# Patient Record
Sex: FEMALE | Race: WHITE | NOT HISPANIC OR LATINO | Employment: OTHER | URBAN - METROPOLITAN AREA
[De-identification: names, ages, dates, MRNs, and addresses within clinical notes are randomized per-mention and may not be internally consistent; named-entity substitution may affect disease eponyms.]

---

## 2018-08-16 ENCOUNTER — APPOINTMENT (EMERGENCY)
Dept: RADIOLOGY | Facility: HOSPITAL | Age: 63
DRG: 536 | End: 2018-08-16
Payer: COMMERCIAL

## 2018-08-16 ENCOUNTER — HOSPITAL ENCOUNTER (INPATIENT)
Facility: HOSPITAL | Age: 63
LOS: 5 days | Discharge: NURSING, REHAB OR LONG-TERM CARE | DRG: 536 | End: 2018-08-21
Attending: EMERGENCY MEDICINE | Admitting: INTERNAL MEDICINE
Payer: COMMERCIAL

## 2018-08-16 DIAGNOSIS — S32.591A BILATERAL PUBIC RAMI FRACTURES, CLOSED, INITIAL ENCOUNTER (HCC): Primary | ICD-10-CM

## 2018-08-16 DIAGNOSIS — S32.592A CLOSED BILATERAL FRACTURE OF PUBIC RAMI, INITIAL ENCOUNTER (HCC): ICD-10-CM

## 2018-08-16 DIAGNOSIS — F41.9 ANXIETY AND DEPRESSION: Chronic | ICD-10-CM

## 2018-08-16 DIAGNOSIS — K59.00 CONSTIPATION: ICD-10-CM

## 2018-08-16 DIAGNOSIS — S32.592A BILATERAL PUBIC RAMI FRACTURES, CLOSED, INITIAL ENCOUNTER (HCC): Primary | ICD-10-CM

## 2018-08-16 DIAGNOSIS — S32.591A CLOSED BILATERAL FRACTURE OF PUBIC RAMI, INITIAL ENCOUNTER (HCC): ICD-10-CM

## 2018-08-16 DIAGNOSIS — F32.A ANXIETY AND DEPRESSION: Chronic | ICD-10-CM

## 2018-08-16 PROBLEM — R32 URINARY INCONTINENCE: Chronic | Status: ACTIVE | Noted: 2018-08-16

## 2018-08-16 LAB
ANION GAP SERPL CALCULATED.3IONS-SCNC: 4 MMOL/L (ref 4–13)
APTT PPP: 32 SECONDS (ref 24–36)
BASOPHILS # BLD AUTO: 0.02 THOUSANDS/ΜL (ref 0–0.1)
BASOPHILS NFR BLD AUTO: 0 % (ref 0–1)
BUN SERPL-MCNC: 18 MG/DL (ref 5–25)
CALCIUM SERPL-MCNC: 8.8 MG/DL (ref 8.3–10.1)
CHLORIDE SERPL-SCNC: 103 MMOL/L (ref 100–108)
CO2 SERPL-SCNC: 30 MMOL/L (ref 21–32)
CREAT SERPL-MCNC: 0.37 MG/DL (ref 0.6–1.3)
EOSINOPHIL # BLD AUTO: 0 THOUSAND/ΜL (ref 0–0.61)
EOSINOPHIL NFR BLD AUTO: 0 % (ref 0–6)
ERYTHROCYTE [DISTWIDTH] IN BLOOD BY AUTOMATED COUNT: 13.2 % (ref 11.6–15.1)
GFR SERPL CREATININE-BSD FRML MDRD: 114 ML/MIN/1.73SQ M
GLUCOSE SERPL-MCNC: 124 MG/DL (ref 65–140)
HCT VFR BLD AUTO: 40.9 % (ref 34.8–46.1)
HGB BLD-MCNC: 13.3 G/DL (ref 11.5–15.4)
IMM GRANULOCYTES # BLD AUTO: 0.04 THOUSAND/UL (ref 0–0.2)
IMM GRANULOCYTES NFR BLD AUTO: 0 % (ref 0–2)
INR PPP: 0.99 (ref 0.86–1.17)
LYMPHOCYTES # BLD AUTO: 0.73 THOUSANDS/ΜL (ref 0.6–4.47)
LYMPHOCYTES NFR BLD AUTO: 7 % (ref 14–44)
MCH RBC QN AUTO: 30.2 PG (ref 26.8–34.3)
MCHC RBC AUTO-ENTMCNC: 32.5 G/DL (ref 31.4–37.4)
MCV RBC AUTO: 93 FL (ref 82–98)
MONOCYTES # BLD AUTO: 0.48 THOUSAND/ΜL (ref 0.17–1.22)
MONOCYTES NFR BLD AUTO: 5 % (ref 4–12)
NEUTROPHILS # BLD AUTO: 8.78 THOUSANDS/ΜL (ref 1.85–7.62)
NEUTS SEG NFR BLD AUTO: 88 % (ref 43–75)
NRBC BLD AUTO-RTO: 0 /100 WBCS
PLATELET # BLD AUTO: 184 THOUSANDS/UL (ref 149–390)
PMV BLD AUTO: 10.3 FL (ref 8.9–12.7)
POTASSIUM SERPL-SCNC: 4.1 MMOL/L (ref 3.5–5.3)
PROTHROMBIN TIME: 12.8 SECONDS (ref 11.8–14.2)
RBC # BLD AUTO: 4.41 MILLION/UL (ref 3.81–5.12)
SODIUM SERPL-SCNC: 137 MMOL/L (ref 136–145)
WBC # BLD AUTO: 10.05 THOUSAND/UL (ref 4.31–10.16)

## 2018-08-16 PROCEDURE — 73502 X-RAY EXAM HIP UNI 2-3 VIEWS: CPT

## 2018-08-16 PROCEDURE — 85025 COMPLETE CBC W/AUTO DIFF WBC: CPT | Performed by: PHYSICIAN ASSISTANT

## 2018-08-16 PROCEDURE — 99284 EMERGENCY DEPT VISIT MOD MDM: CPT

## 2018-08-16 PROCEDURE — 85730 THROMBOPLASTIN TIME PARTIAL: CPT | Performed by: PHYSICIAN ASSISTANT

## 2018-08-16 PROCEDURE — 99223 1ST HOSP IP/OBS HIGH 75: CPT | Performed by: INTERNAL MEDICINE

## 2018-08-16 PROCEDURE — 85610 PROTHROMBIN TIME: CPT | Performed by: PHYSICIAN ASSISTANT

## 2018-08-16 PROCEDURE — 96374 THER/PROPH/DIAG INJ IV PUSH: CPT

## 2018-08-16 PROCEDURE — 36415 COLL VENOUS BLD VENIPUNCTURE: CPT | Performed by: PHYSICIAN ASSISTANT

## 2018-08-16 PROCEDURE — 80048 BASIC METABOLIC PNL TOTAL CA: CPT | Performed by: PHYSICIAN ASSISTANT

## 2018-08-16 RX ORDER — OXYCODONE HYDROCHLORIDE AND ACETAMINOPHEN 5; 325 MG/1; MG/1
1 TABLET ORAL EVERY 4 HOURS PRN
Status: DISCONTINUED | OUTPATIENT
Start: 2018-08-16 | End: 2018-08-18

## 2018-08-16 RX ORDER — OXYBUTYNIN CHLORIDE 5 MG/1
10 TABLET, EXTENDED RELEASE ORAL
Status: DISCONTINUED | OUTPATIENT
Start: 2018-08-16 | End: 2018-08-21 | Stop reason: HOSPADM

## 2018-08-16 RX ORDER — MORPHINE SULFATE 2 MG/ML
2 INJECTION, SOLUTION INTRAMUSCULAR; INTRAVENOUS EVERY 4 HOURS PRN
Status: DISCONTINUED | OUTPATIENT
Start: 2018-08-16 | End: 2018-08-18

## 2018-08-16 RX ORDER — MORPHINE SULFATE 2 MG/ML
2 INJECTION, SOLUTION INTRAMUSCULAR; INTRAVENOUS EVERY 4 HOURS PRN
Status: DISCONTINUED | OUTPATIENT
Start: 2018-08-16 | End: 2018-08-16

## 2018-08-16 RX ORDER — DULOXETIN HYDROCHLORIDE 60 MG/1
90 CAPSULE, DELAYED RELEASE ORAL DAILY
COMMUNITY
End: 2020-05-12 | Stop reason: ALTCHOICE

## 2018-08-16 RX ORDER — ALPRAZOLAM 0.25 MG/1
0.25 TABLET ORAL 2 TIMES DAILY PRN
Status: ON HOLD | COMMUNITY
End: 2018-08-21

## 2018-08-16 RX ORDER — OXYBUTYNIN CHLORIDE 10 MG/1
10 TABLET, EXTENDED RELEASE ORAL DAILY
COMMUNITY
End: 2020-05-15 | Stop reason: ALTCHOICE

## 2018-08-16 RX ORDER — METHOCARBAMOL 500 MG/1
500 TABLET, FILM COATED ORAL ONCE
Status: COMPLETED | OUTPATIENT
Start: 2018-08-16 | End: 2018-08-16

## 2018-08-16 RX ORDER — ACETAMINOPHEN 325 MG/1
650 TABLET ORAL EVERY 8 HOURS SCHEDULED
Status: DISCONTINUED | OUTPATIENT
Start: 2018-08-16 | End: 2018-08-18

## 2018-08-16 RX ORDER — DOCUSATE SODIUM 100 MG/1
100 CAPSULE, LIQUID FILLED ORAL 2 TIMES DAILY
Status: DISCONTINUED | OUTPATIENT
Start: 2018-08-16 | End: 2018-08-21 | Stop reason: HOSPADM

## 2018-08-16 RX ORDER — MELATONIN
1000 DAILY
COMMUNITY

## 2018-08-16 RX ORDER — IBUPROFEN 600 MG/1
600 TABLET ORAL ONCE
Status: COMPLETED | OUTPATIENT
Start: 2018-08-16 | End: 2018-08-16

## 2018-08-16 RX ORDER — ALPRAZOLAM 0.25 MG/1
0.25 TABLET ORAL
Status: DISCONTINUED | OUTPATIENT
Start: 2018-08-16 | End: 2018-08-17

## 2018-08-16 RX ORDER — ONDANSETRON 2 MG/ML
4 INJECTION INTRAMUSCULAR; INTRAVENOUS EVERY 6 HOURS PRN
Status: DISCONTINUED | OUTPATIENT
Start: 2018-08-16 | End: 2018-08-21 | Stop reason: HOSPADM

## 2018-08-16 RX ORDER — MORPHINE SULFATE 2 MG/ML
2 INJECTION, SOLUTION INTRAMUSCULAR; INTRAVENOUS ONCE
Status: COMPLETED | OUTPATIENT
Start: 2018-08-16 | End: 2018-08-16

## 2018-08-16 RX ADMIN — IBUPROFEN 600 MG: 600 TABLET ORAL at 13:23

## 2018-08-16 RX ADMIN — OXYBUTYNIN CHLORIDE 10 MG: 5 TABLET, EXTENDED RELEASE ORAL at 21:27

## 2018-08-16 RX ADMIN — MORPHINE SULFATE 2 MG: 2 INJECTION, SOLUTION INTRAMUSCULAR; INTRAVENOUS at 21:27

## 2018-08-16 RX ADMIN — MORPHINE SULFATE 2 MG: 2 INJECTION, SOLUTION INTRAMUSCULAR; INTRAVENOUS at 17:28

## 2018-08-16 RX ADMIN — ACETAMINOPHEN 650 MG: 325 TABLET, FILM COATED ORAL at 21:27

## 2018-08-16 RX ADMIN — MORPHINE SULFATE 2 MG: 2 INJECTION, SOLUTION INTRAMUSCULAR; INTRAVENOUS at 14:35

## 2018-08-16 RX ADMIN — METHOCARBAMOL 500 MG: 500 TABLET ORAL at 13:23

## 2018-08-16 RX ADMIN — OXYCODONE HYDROCHLORIDE AND ACETAMINOPHEN 1 TABLET: 5; 325 TABLET ORAL at 19:53

## 2018-08-16 NOTE — H&P
[address] [facility]  H&P- Pradeep Swenson 1955, 61 y o  female MRN: [id number]    Unit/Bed#: ED 13 Encounter: [id number]    Primary Care Provider: Katalina Arango MD   Date and time admitted to hospital: 8/16/2018 12:11 PM        * Closed bilateral fracture of pubic rami [facility])   Assessment & Plan    · POA, noted on x-rays, do not appear to be displaced  Fall seemed to have been mechanical in nature  Pain 3/10 at this time  Uses a walker at baseline to ambulate   · Admit patient to med/surg under inpatient status   · Consult orthopedic surgery - do not feel that surgical intervention is needed, but outpatient follow up will be necessary   · Analgesia  · Tylenol 975 mg PO Q8 scheduled   · Percocet 5/325 mg PO Q4 PRN moderate pain   · Morphine 2 mg IV Q4 severe/breakthrough pain   · PT/OT        Anxiety and depression   Assessment & Plan    · Stable at this time   · Continue Cymbalta with Xanax QHS PRN         Urinary incontinence   Assessment & Plan    · Chronic   · Continue Oxybutynin           VTE Prophylaxis: Enoxaparin (Lovenox)  / sequential compression device   Code Status: Full Code   POLST: POLST form is not discussed and not completed at this time  Discussion with family: No family at bedside - will return if family requests to discuss plan     Anticipated Length of Stay:  Patient will be admitted on an Inpatient basis with an anticipated length of stay of  Greater than 2 midnights  Justification for Hospital Stay: Bilateral pubic rami fractures needing IV pain medication, inpatient PT/OT eval     Total Time for Visit, including Counseling / Coordination of Care: 1 hour  Greater than 50% of this total time spent on direct patient counseling and coordination of care  Chief Complaint:   Fall    History of Present Illness:    Pradeep Swenson is a 61 y o  female with a history of urinary incontinence who presents with a fall   The patient reports that last night she was walking with her 2 canes when she believes that her right knee gave out from underneath her and she fell backwards on her right side in a seated position  She reported immediate pain in the bilateral hips that she described as a "tight strained" pain  She states that she was able to ambulate after the fall, but she had pain and needed to upgrade to her walker  It was at that time that she decided to come in for evaluation  At this point she states that she is comfortable after pain medication reporting her pain at a 3/10  She denies any numbness, tingling, or weakness in her legs any more than usual  She reports urinary incontinence, however this is a chronic problem for her  She denies fecal incontinence  She denies saddle anesthesia  She denies symptoms prior to the fall such as chest pain, palpitations, shortness of breath, headaches, dizziness, or change in vision  She does report a history of a blood clot in her right leg after a prior joint replacement, but is not on anti-coagulation currently  Review of Systems:    Review of Systems   Constitutional: Negative for appetite change, chills, diaphoresis, fatigue and fever  HENT: Negative for congestion, rhinorrhea and sore throat  Eyes: Negative for visual disturbance  Respiratory: Negative for cough, chest tightness, shortness of breath and wheezing  Cardiovascular: Negative for chest pain, palpitations and leg swelling  Gastrointestinal: Negative for abdominal pain, constipation, diarrhea, nausea and vomiting  Genitourinary: Negative for dysuria  Musculoskeletal: Positive for arthralgias (bilateral hips) and gait problem  Negative for myalgias  Neurological: Negative for dizziness, syncope, weakness, light-headedness, numbness and headaches  All other systems reviewed and are negative        Past Medical and Surgical History:     Past Medical History:   Diagnosis Date    Scoliosis        Past Surgical History:   Procedure Laterality Date    JOINT REPLACEMENT      left hip and knee       Meds/Allergies:    Prior to Admission medications    Medication Sig Start Date End Date Taking? Authorizing Provider   ALPRAZolam Virgene Reeines) 0 25 mg tablet Take 0 25 mg by mouth daily at bedtime as needed for anxiety   Yes Historical Provider, MD   DULoxetine (CYMBALTA) 60 mg delayed release capsule Take 90 mg by mouth daily   Yes Historical Provider, MD   oxybutynin (DITROPAN-XL) 10 MG 24 hr tablet Take 10 mg by mouth daily at bedtime   Yes Historical Provider, MD     I have reviewed home medications with patient personally  Allergies: No Known Allergies    Social History:     Marital Status: /Civil Union   Occupation: None  Patient Pre-hospital Living Situation: Home with   Patient Pre-hospital Level of Mobility: Canes/walker  Patient Pre-hospital Diet Restrictions: None  Substance Use History:   History   Alcohol Use No     History   Smoking Status    Never Smoker   Smokeless Tobacco    Never Used     History   Drug Use No       Family History:    History reviewed  No pertinent family history  Physical Exam:     Vitals:   Blood Pressure: (!) 178/71 (08/16/18 1211)  Pulse: 78 (08/16/18 1211)  Temperature: 99 1 °F (37 3 °C) (08/16/18 1211)  Temp Source: Oral (08/16/18 1211)  Respirations: 18 (08/16/18 1211)  Weight - Scale: 57 9 kg (127 lb 10 3 oz) (08/16/18 1211)  SpO2: 96 % (08/16/18 1211)    Physical Exam   Constitutional: She is oriented to person, place, and time  Vital signs are normal  She appears well-developed and well-nourished  Non-toxic appearance  She appears distressed (mild)  HENT:   Head: Normocephalic and atraumatic  Mouth/Throat: Mucous membranes are not dry  Eyes: Conjunctivae and EOM are normal  Pupils are equal, round, and reactive to light  No scleral icterus  Pupils are equal    Neck: Neck supple  Cardiovascular: Normal rate, regular rhythm, S1 normal, S2 normal, normal heart sounds and intact distal pulses    Exam reveals no S3 and no S4  No murmur heard  Pulmonary/Chest: Effort normal and breath sounds normal  No accessory muscle usage  No respiratory distress  She has no decreased breath sounds  She has no wheezes  She has no rhonchi  She has no rales  She exhibits no tenderness  Abdominal: Soft  Bowel sounds are normal  She exhibits no distension and no mass  There is no tenderness  There is no rigidity, no rebound and no guarding  Musculoskeletal:        Right hip: She exhibits tenderness  Left hip: She exhibits tenderness  Neurological: She is alert and oriented to person, place, and time  She is not disoriented  No sensory deficit  Skin: Skin is warm and dry  Additional Data:     Lab Results: I have personally reviewed pertinent reports  Results from last 7 days  Lab Units 08/16/18  1434   WBC Thousand/uL 10 05   HEMOGLOBIN g/dL 13 3   HEMATOCRIT % 40 9   PLATELETS Thousands/uL 184   NEUTROS PCT % 88*   LYMPHS PCT % 7*   MONOS PCT % 5   EOS PCT % 0       Results from last 7 days  Lab Units 08/16/18  1434   SODIUM mmol/L 137   POTASSIUM mmol/L 4 1   CHLORIDE mmol/L 103   CO2 mmol/L 30   BUN mg/dL 18   CREATININE mg/dL 0 37*   CALCIUM mg/dL 8 8   GLUCOSE RANDOM mg/dL 124       Results from last 7 days  Lab Units 08/16/18  1434   INR  0 99               Imaging: I have personally reviewed pertinent reports  XR hip/pelv 2-3 vws right if performed   ED Interpretation by Hasmukh Arango PA-C (08/16 1406)   Superior and inferior pubic rami fracture on the right  Final Result by Grant Harris DO (08/16 1438)   Fractures of bilateral inferior and superior pubic rami as described  Deformity of the right femoral head which appears to be degenerative  If there is any clinical suspicion for an occult right femoral head or neck fracture: Consider CT or preferably    MRI        Workstation performed: [id number]             EKG, Pathology, and Other Studies Reviewed on Admission:   XR Hip/Pelvis Right: Bilateral inferior and superior pubic rami fracture as described  Deformity of the right femoral head appears to be degenerative  Allscripts / Epic Records Reviewed: Yes     ** Please Note: This note has been constructed using a voice recognition system   **

## 2018-08-16 NOTE — ASSESSMENT & PLAN NOTE
· POA, noted on x-rays, do not appear to be displaced  Fall seemed to have been mechanical in nature  Pain 3/10 at this time   Uses a walker at baseline to ambulate   · Admit patient to med/surg under inpatient status   · Consult orthopedic surgery - do not feel that surgical intervention is needed, but outpatient follow up will be necessary   · Analgesia  · Tylenol 975 mg PO Q8 scheduled   · Percocet 5/325 mg PO Q4 PRN moderate pain   · Morphine 2 mg IV Q4 severe/breakthrough pain   · PT/OT

## 2018-08-16 NOTE — ED PROVIDER NOTES
History  Chief Complaint   Patient presents with    Hip Injury     pt states she fell last night, landed on buttocks initially but then to avoid hitting her head she leaned onto her right side  now c/o right hip pain  hx of falls  40-year-old female with past medical history of scoliosis, who presents to the emergency department for right hip pain status post fall yesterday  Patient states that she was ambulating when she lost her balance and fell backwards landing on her buttocks and shifting onto the right side  Since then she has complained of aching 4/10 pain that is worse with ambulation and palpation  Denies numbness, tingling, weakness  Denies redness or warmth  Denies ecchymosis  Neck denies fevers or chills  Denies chest pain, shortness of breath, palpitations prior to fall  Denies head injury or head strike  Has not take anything for pain prior to arrival         History provided by:  Patient   used: No        Prior to Admission Medications   Prescriptions Last Dose Informant Patient Reported? Taking? ALPRAZolam (XANAX) 0 25 mg tablet   Yes Yes   Sig: Take 0 25 mg by mouth daily at bedtime as needed for anxiety   DULoxetine (CYMBALTA) 60 mg delayed release capsule   Yes Yes   Sig: Take 90 mg by mouth daily   oxybutynin (DITROPAN-XL) 10 MG 24 hr tablet   Yes Yes   Sig: Take 10 mg by mouth daily at bedtime      Facility-Administered Medications: None       Past Medical History:   Diagnosis Date    Scoliosis        Past Surgical History:   Procedure Laterality Date    JOINT REPLACEMENT      left hip and knee       History reviewed  No pertinent family history  I have reviewed and agree with the history as documented  Social History   Substance Use Topics    Smoking status: Never Smoker    Smokeless tobacco: Never Used    Alcohol use No        Review of Systems   Constitutional: Negative for chills and fever  HENT: Negative  Eyes: Negative      Respiratory: Negative for cough, chest tightness, shortness of breath and wheezing  Cardiovascular: Negative for chest pain, palpitations and leg swelling  Gastrointestinal: Negative for abdominal pain, constipation, diarrhea, nausea and vomiting  Genitourinary: Negative for dysuria, flank pain, frequency, hematuria and urgency  Musculoskeletal: Positive for arthralgias and gait problem  Negative for back pain, joint swelling, myalgias, neck pain and neck stiffness  Skin: Negative for color change, pallor, rash and wound  Neurological: Negative for dizziness, syncope, weakness, light-headedness, numbness and headaches  Psychiatric/Behavioral: Negative  Physical Exam  Physical Exam   Constitutional: She is oriented to person, place, and time  She appears well-developed and well-nourished  No distress  HENT:   Head: Normocephalic and atraumatic  Eyes: Conjunctivae and EOM are normal  Pupils are equal, round, and reactive to light  Neck: Normal range of motion  Neck supple  Cardiovascular: Normal rate, regular rhythm and intact distal pulses  Pulmonary/Chest: Effort normal and breath sounds normal  She has no wheezes  She has no rales  Abdominal: Soft  There is no tenderness  Musculoskeletal: Normal range of motion  She exhibits tenderness (mild to right lateral hip and groin  No pelvic instability  )  She exhibits no edema  Neurological: She is alert and oriented to person, place, and time  No cranial nerve deficit or sensory deficit  She exhibits normal muscle tone  Coordination normal    Skin: Skin is warm and dry  Capillary refill takes less than 2 seconds  She is not diaphoretic  Psychiatric: She has a normal mood and affect  Her behavior is normal    Nursing note and vitals reviewed        Vital Signs  ED Triage Vitals   Temperature Pulse Respirations Blood Pressure SpO2   08/16/18 1211 08/16/18 1211 08/16/18 1211 08/16/18 1211 08/16/18 1211   99 1 °F (37 3 °C) 78 18 (!) 178/71 96 % Temp Source Heart Rate Source Patient Position - Orthostatic VS BP Location FiO2 (%)   08/16/18 1211 08/16/18 1211 -- -- --   Oral Monitor         Pain Score       08/16/18 1323       5           Vitals:    08/16/18 1211   BP: (!) 178/71   Pulse: 78       Visual Acuity      ED Medications  Medications   morphine injection 2 mg (not administered)   methocarbamol (ROBAXIN) tablet 500 mg (500 mg Oral Given 8/16/18 1323)   ibuprofen (MOTRIN) tablet 600 mg (600 mg Oral Given 8/16/18 1323)   morphine injection 2 mg (2 mg Intravenous Given 8/16/18 1435)       Diagnostic Studies  Results Reviewed     Procedure Component Value Units Date/Time    Protime-INR [[id number]]  (Normal) Collected:  08/16/18 1434    Lab Status:  Final result Specimen:  Blood from Arm, Right Updated:  08/16/18 1452     Protime 12 8 seconds      INR 0 99    APTT [[id number]]  (Normal) Collected:  08/16/18 1434    Lab Status:  Final result Specimen:  Blood from Arm, Right Updated:  08/16/18 1452     PTT 32 seconds     Basic metabolic panel [[id number]]  (Abnormal) Collected:  08/16/18 1434    Lab Status:  Final result Specimen:  Blood from Arm, Right Updated:  08/16/18 1451     Sodium 137 mmol/L      Potassium 4 1 mmol/L      Chloride 103 mmol/L      CO2 30 mmol/L      Anion Gap 4 mmol/L      BUN 18 mg/dL      Creatinine 0 37 (L) mg/dL      Glucose 124 mg/dL      Calcium 8 8 mg/dL      eGFR 114 ml/min/1 73sq m     Narrative:         National Kidney Disease Education Program recommendations are as follows:  GFR calculation is accurate only with a steady state creatinine  Chronic Kidney disease less than 60 ml/min/1 73 sq  meters  Kidney failure less than 15 ml/min/1 73 sq  meters      CBC and differential [[id number]]  (Abnormal) Collected:  08/16/18 1434    Lab Status:  Final result Specimen:  Blood from Arm, Right Updated:  08/16/18 1445     WBC 10 05 Thousand/uL      RBC 4 41 Million/uL      Hemoglobin 13 3 g/dL      Hematocrit 40 9 %      MCV 93 fL MCH 30 2 pg      MCHC 32 5 g/dL      RDW 13 2 %      MPV 10 3 fL      Platelets 554 Thousands/uL      nRBC 0 /100 WBCs      Neutrophils Relative 88 (H) %      Immat GRANS % 0 %      Lymphocytes Relative 7 (L) %      Monocytes Relative 5 %      Eosinophils Relative 0 %      Basophils Relative 0 %      Neutrophils Absolute 8 78 (H) Thousands/µL      Immature Grans Absolute 0 04 Thousand/uL      Lymphocytes Absolute 0 73 Thousands/µL      Monocytes Absolute 0 48 Thousand/µL      Eosinophils Absolute 0 00 Thousand/µL      Basophils Absolute 0 02 Thousands/µL                  XR hip/pelv 2-3 vws right if performed   ED Interpretation by Verner Ruby, PA-C (08/16 1406)   Superior and inferior pubic rami fracture on the right  Final Result by Reina Real DO (08/16 1438)   Fractures of bilateral inferior and superior pubic rami as described  Deformity of the right femoral head which appears to be degenerative  If there is any clinical suspicion for an occult right femoral head or neck fracture: Consider CT or preferably    MRI  Workstation performed: [id number]                    Procedures  Procedures       Phone Contacts  ED Phone Contact    ED Course                               MDM  Number of Diagnoses or Management Options  Bilateral pubic rami fractures, closed, initial encounter [facility]):   Diagnosis management comments: Differential Diagnosis includes but is not limited to: sprain/strain, contusion, fracture/dislocation, musculoskeletal pain  X-ray of the right hip and pelvis shows bilateral superior and inferior pubic rami fractures  Labs are generally unremarkable  Admit to Medicine with Ortho to consult         Amount and/or Complexity of Data Reviewed  Clinical lab tests: ordered and reviewed  Tests in the radiology section of CPT®: ordered and reviewed      CritCare Time    Disposition  Final diagnoses:   Bilateral pubic rami fractures, closed, initial encounter [facility])     Time reflects when diagnosis was documented in both MDM as applicable and the Disposition within this note     Time User Action Codes Description Comment    8/16/2018  3:02 PM Leana Mcburney Add [S32 591A,  S32 592A] Bilateral pubic rami fractures, closed, initial encounter ([facility] [address] )     8/16/2018  3:57 PM Charlene Hastings Add [B28 218U,  S32 592A] Closed bilateral fracture of pubic rami, initial encounter ([facility] )     8/16/2018  3:57 PM Charlene Hastings Modify [P65 251C,  S32 592A] Closed bilateral fracture of pubic rami, initial encounter [facility])       ED Disposition     ED Disposition Condition Comment    Admit  Case was discussed with Dr Terri Sim and the patient's admission status was agreed to be Admission Status: inpatient status to the service of Dr Terri Sim   Follow-up Information    None         Patient's Medications   Discharge Prescriptions    No medications on file     No discharge procedures on file      ED Provider  Electronically Signed by           Tyree Lu PA-C  08/16/18 3543

## 2018-08-17 ENCOUNTER — APPOINTMENT (INPATIENT)
Dept: CT IMAGING | Facility: HOSPITAL | Age: 63
DRG: 536 | End: 2018-08-17
Payer: COMMERCIAL

## 2018-08-17 LAB
ERYTHROCYTE [DISTWIDTH] IN BLOOD BY AUTOMATED COUNT: 13.6 % (ref 11.6–15.1)
HCT VFR BLD AUTO: 41.6 % (ref 34.8–46.1)
HGB BLD-MCNC: 13.1 G/DL (ref 11.5–15.4)
MCH RBC QN AUTO: 30 PG (ref 26.8–34.3)
MCHC RBC AUTO-ENTMCNC: 31.5 G/DL (ref 31.4–37.4)
MCV RBC AUTO: 95 FL (ref 82–98)
PLATELET # BLD AUTO: 143 THOUSANDS/UL (ref 149–390)
PMV BLD AUTO: 11.1 FL (ref 8.9–12.7)
RBC # BLD AUTO: 4.37 MILLION/UL (ref 3.81–5.12)
WBC # BLD AUTO: 8.58 THOUSAND/UL (ref 4.31–10.16)

## 2018-08-17 PROCEDURE — 73700 CT LOWER EXTREMITY W/O DYE: CPT

## 2018-08-17 PROCEDURE — 97530 THERAPEUTIC ACTIVITIES: CPT

## 2018-08-17 PROCEDURE — 97163 PT EVAL HIGH COMPLEX 45 MIN: CPT

## 2018-08-17 PROCEDURE — 97166 OT EVAL MOD COMPLEX 45 MIN: CPT

## 2018-08-17 PROCEDURE — G8988 SELF CARE GOAL STATUS: HCPCS

## 2018-08-17 PROCEDURE — G8978 MOBILITY CURRENT STATUS: HCPCS

## 2018-08-17 PROCEDURE — G8979 MOBILITY GOAL STATUS: HCPCS

## 2018-08-17 PROCEDURE — 99254 IP/OBS CNSLTJ NEW/EST MOD 60: CPT | Performed by: PHYSICIAN ASSISTANT

## 2018-08-17 PROCEDURE — G8987 SELF CARE CURRENT STATUS: HCPCS

## 2018-08-17 PROCEDURE — 85027 COMPLETE CBC AUTOMATED: CPT | Performed by: PHYSICIAN ASSISTANT

## 2018-08-17 PROCEDURE — 99232 SBSQ HOSP IP/OBS MODERATE 35: CPT | Performed by: NURSE PRACTITIONER

## 2018-08-17 RX ORDER — ALPRAZOLAM 0.25 MG/1
0.25 TABLET ORAL 2 TIMES DAILY PRN
Status: DISCONTINUED | OUTPATIENT
Start: 2018-08-17 | End: 2018-08-21 | Stop reason: HOSPADM

## 2018-08-17 RX ORDER — POLYETHYLENE GLYCOL 3350 17 G/17G
17 POWDER, FOR SOLUTION ORAL DAILY
Status: DISCONTINUED | OUTPATIENT
Start: 2018-08-18 | End: 2018-08-21 | Stop reason: HOSPADM

## 2018-08-17 RX ADMIN — OXYCODONE HYDROCHLORIDE AND ACETAMINOPHEN 1 TABLET: 5; 325 TABLET ORAL at 20:14

## 2018-08-17 RX ADMIN — OXYBUTYNIN CHLORIDE 10 MG: 5 TABLET, EXTENDED RELEASE ORAL at 21:06

## 2018-08-17 RX ADMIN — DULOXETINE HYDROCHLORIDE 90 MG: 60 CAPSULE, DELAYED RELEASE ORAL at 09:21

## 2018-08-17 RX ADMIN — ACETAMINOPHEN 650 MG: 325 TABLET, FILM COATED ORAL at 05:45

## 2018-08-17 RX ADMIN — ACETAMINOPHEN 650 MG: 325 TABLET, FILM COATED ORAL at 21:06

## 2018-08-17 RX ADMIN — OXYCODONE HYDROCHLORIDE AND ACETAMINOPHEN 1 TABLET: 5; 325 TABLET ORAL at 07:50

## 2018-08-17 RX ADMIN — ALPRAZOLAM 0.25 MG: 0.25 TABLET ORAL at 19:11

## 2018-08-17 RX ADMIN — ENOXAPARIN SODIUM 40 MG: 40 INJECTION SUBCUTANEOUS at 09:21

## 2018-08-17 RX ADMIN — MORPHINE SULFATE 2 MG: 2 INJECTION, SOLUTION INTRAMUSCULAR; INTRAVENOUS at 17:12

## 2018-08-17 RX ADMIN — ACETAMINOPHEN 650 MG: 325 TABLET, FILM COATED ORAL at 14:14

## 2018-08-17 RX ADMIN — MORPHINE SULFATE 2 MG: 2 INJECTION, SOLUTION INTRAMUSCULAR; INTRAVENOUS at 09:21

## 2018-08-17 RX ADMIN — DOCUSATE SODIUM 100 MG: 100 CAPSULE, LIQUID FILLED ORAL at 09:22

## 2018-08-17 RX ADMIN — OXYCODONE HYDROCHLORIDE AND ACETAMINOPHEN 1 TABLET: 5; 325 TABLET ORAL at 12:05

## 2018-08-17 RX ADMIN — DOCUSATE SODIUM 100 MG: 100 CAPSULE, LIQUID FILLED ORAL at 17:12

## 2018-08-17 NOTE — SOCIAL WORK
CM met with Pt and Pt's daughter at bedside  Pt lives with her spouse in a 1 level home with 2 JJ  Pt uses a cane and walker  Pt reports being independent with ambulation and ADL's  Pt does not have a history of Rehab  Pt has had [facility] in the past but not currently  Pt uses [facility] in [place] and has Rx coverage  Pt denies any D&A history  Pt has depression and is prescribed medication by her PCP  Pt does not have a POA and declines information  Pt drives herself  Currently PT recommendation is STR as Pt is NWB  CT Scan is pending and if negative for fracture PT will need to re-eval  CM dept will follow Pt and determine DCP  CM did leave Pt with a SNF list in case she needs STR

## 2018-08-17 NOTE — PLAN OF CARE
Problem: PHYSICAL THERAPY ADULT  Goal: Performs mobility at highest level of function for planned discharge setting  See evaluation for individualized goals  Treatment/Interventions: Functional transfer training, LE strengthening/ROM, Elevations, Therapeutic exercise, Endurance training, Cognitive reorientation, Patient/family training, Gait training, Bed mobility, Equipment eval/education, Spoke to nursing  Equipment Recommended: Wheelchair       See flowsheet documentation for full assessment, interventions and recommendations  Prognosis: Fair  Problem List: Decreased strength, Decreased range of motion, Decreased endurance, Impaired balance, Decreased mobility, Decreased coordination, Orthopedic restrictions, Pain  Assessment: Pt is a 61 y o  female seen for PT evaluation s/p admit to [facility] on 8/16/2018 w/ Closed bilateral fracture of pubic rami ([address] )  However ortho consult via physician extender has recommended further imaging, NWB on RLE and WBAT on LLE  Order placed for PT  Prior to admission, pt lived with spouse, was on disability, used 1-2 canes vs furniture support for mobility and had 2 JJ, and step on main floor from kitchen to laundry room  Upon evaluation: Pt requires 1person assistance for bed mobilty, 2person assistance for transfers and is currently non amb due to pain and difficulty maintaining NWB RLE  Ken Oliva Pt's clinical presentation is currently unstable/unpredictable given the functional mobility deficits above, coupled with fall risks including hx of falls, impulsivity, impaired balance and limited WB, prior use of A device, and combined with medical complications of hypertension   Pt to benefit from continued skilled PT tx while in hospital and upon DC to address deficits as defined above and maximize level of functional mobility   From PT/mobility standpoint, recommendation at time of d/c would be inpatient rehab pending progress in order to maximize pt's functional independence and consistency w/ mobility in order to facilitate return to PLOF  Recommend trial with WC next 1-2 sessions, ther ex next 1-2 sessions and further assessment with walker pending definitive ortho plan/further imaging as indicated  Barriers to Discharge: Decreased caregiver support, Inaccessible home environment     Recommendation: Post acute IP rehab          See flowsheet documentation for full assessment

## 2018-08-17 NOTE — PROGRESS NOTES
[address] [facility]    Progress Note - Bassem Escobar 1955, 61 y o  female MRN: [id number]    Unit/Bed#: -Arlene Encounter: [id number]    Primary Care Provider: Caitlyn Lindo MD   Date and time admitted to hospital: 8/16/2018 12:11 PM    * Closed bilateral fracture of pubic rami [facility])   Assessment & Plan    · POA, noted on x-rays, do not appear to be displaced  Fall seemed to have been mechanical in nature  Pain 3/10 at this time  Uses a 2 canes or a walker at baseline to ambulate   · Ortho consult noted, recommending CT of right hip given deformity of the right femoral head would want to r/o fracture given increased pain on that side s/p fall  No surgical intervention needed for pubic rami fx, outpatient f/u    · F/u on CT Scan  NWB until fracture ruled out  If negative, PT re-eval for therapy recommendations, currently recommending inpatient however this is with NWB status  · DVT prophylaxis  · Pain control with:  · Tylenol 975 mg PO Q8 scheduled   · Percocet 5/325 mg PO Q4 PRN moderate pain   · Morphine 2 mg IV Q4 severe/breakthrough pain         Anxiety and depression   Assessment & Plan    · Stable at this time   · Continue Cymbalta with Xanax BID PRN         Urinary incontinence   Assessment & Plan    · Chronic   · Continue Oxybutynin           Pharmacologic: Enoxaparin (Lovenox)  Mechanical VTE Prophylaxis in Place: Yes    Patient Centered Rounds: I have performed bedside rounds with nursing staff today  Discussions with Specialists or Other Care Team Provider: nursing, PT, CM    Education and Discussions with Family / Patient: Patient and daughter at bedside  Time Spent for Care: 30 minutes  More than 50% of total time spent on counseling and coordination of care as described above      Current Length of Stay: 1 day(s)    Current Patient Status: Inpatient   Certification Statement: The patient will continue to require additional inpatient hospital stay due to Ct of right hip pending, additional ortho recommendations, PT re-eval for dispo  Discharge Plan / Estimated Discharge Date: Hopefully tomorrow if CT negative for fracture and PT ok with home  Code Status: Level 1 - Full Code      Subjective:   Patient still complains of pain but states that it is well controlled  States that pain is worse on right side into the groin  She is unsure if she fell more so on the right versus the left  She states that she was using 2 canes when the fall occurred  She reports using assistive devices secondary to scoliosis for many years  Objective:     Vitals:   Temp (24hrs), Av 4 °F (36 9 °C), Min:97 9 °F (36 6 °C), Max:98 8 °F (37 1 °C)    HR:  [74-75] 75  Resp:  [18] 18  BP: (109-140)/(53-66) 130/61  SpO2:  [91 %-99 %] 99 %  Body mass index is 23 62 kg/m²  Input and Output Summary (last 24 hours): Intake/Output Summary (Last 24 hours) at 18 1441  Last data filed at 18 0900   Gross per 24 hour   Intake              360 ml   Output                0 ml   Net              360 ml       Physical Exam:     Physical Exam   Constitutional: She is oriented to person, place, and time  No distress  HENT:   Head: Normocephalic  Eyes: Pupils are equal, round, and reactive to light  Neck: Normal range of motion  Cardiovascular: Normal rate, regular rhythm and intact distal pulses  No murmur heard  Pulmonary/Chest: Effort normal and breath sounds normal    Abdominal: Soft  Bowel sounds are normal    Musculoskeletal: She exhibits tenderness (Right hip, groin, bilateral lower back) and deformity (Scoliosis noted)  Nonweightbearing to right lower extremity   Neurological: She is alert and oriented to person, place, and time  Skin: Skin is warm and dry  Psychiatric: She has a normal mood and affect  Nursing note and vitals reviewed        Additional Data:     Labs:      Results from last 7 days  Lab Units 18  0532 18  1434   WBC Thousand/uL 8 58 10 05   HEMOGLOBIN g/dL 13 1 13 3   HEMATOCRIT % 41 6 40 9   PLATELETS Thousands/uL 143* 184   NEUTROS PCT %  --  88*   LYMPHS PCT %  --  7*   MONOS PCT %  --  5   EOS PCT %  --  0       Results from last 7 days  Lab Units 08/16/18  1434   SODIUM mmol/L 137   POTASSIUM mmol/L 4 1   CHLORIDE mmol/L 103   CO2 mmol/L 30   BUN mg/dL 18   CREATININE mg/dL 0 37*   CALCIUM mg/dL 8 8   GLUCOSE RANDOM mg/dL 124       Results from last 7 days  Lab Units 08/16/18  1434   INR  0 99         Recent Cultures (last 7 days):           Last 24 Hours Medication List:     Current Facility-Administered Medications:  acetaminophen 650 mg Oral Q8H [address] Yanniko Suarez, EMANUEL   ALPRAZolam 0 25 mg Oral BID PRN JUAN CARLOS Rivera   docusate sodium 100 mg Oral BID Yan Lowery, PA-MERARI   DULoxetine 90 mg Oral Daily Yan Suarez, PA-C   enoxaparin 40 mg Subcutaneous Daily Yan Suarez, PA-MERARI   morphine injection 2 mg Intravenous Q4H PRN Yan Lowery, PA-C   morphine injection 2 mg Intravenous Q4H PRN Lori Colon, PA-C   ondansetron 4 mg Intravenous Q6H PRN Yan Lowery, PA-C   oxybutynin 10 mg Oral HS Yan Lowery, PA-C   oxyCODONE-acetaminophen 1 tablet Oral Q4H PRN Yan Lowery, EMANUEL   [START ON 8/18/2018] polyethylene glycol 17 g Oral Daily JUAN CARLOS Ruiz        Today, Patient Was Seen By: JUAN CARLOS Lucia    ** Please Note: Dragon 360 Dictation voice to text software may have been used in the creation of this document   **

## 2018-08-17 NOTE — CASE MANAGEMENT
Initial Clinical Review    Admission: Date/Time/Statement: 8/16/18 @ 1503     Orders Placed This Encounter   Procedures    Inpatient Admission (expected length of stay for this patient is greater than two midnights)     Standing Status:   Standing     Number of Occurrences:   1     Order Specific Question:   Admitting Physician     Answer:   Wayne Stanley     Order Specific Question:   Level of Care     Answer:   Med Surg [16]     Order Specific Question:   Estimated length of stay     Answer:   More than 2 Midnights     Order Specific Question:   Certification     Answer:   I certify that inpatient services are medically necessary for this patient for a duration of greater than two midnights  See H&P and MD Progress Notes for additional information about the patient's course of treatment  ED: Date/Time/Mode of Arrival:   ED Arrival Information     Expected Arrival Acuity Means of Arrival Escorted By Service Admission Type    - 8/16/2018 12:01 Less Urgent Wheelchair Family Member General Medicine Urgent    Arrival Complaint    hip injury          Chief Complaint:   Chief Complaint   Patient presents with    Hip Injury     pt states she fell last night, landed on buttocks initially but then to avoid hitting her head she leaned onto her right side  now c/o right hip pain  hx of falls  History of Illness: 61 y o  female with a history of urinary incontinence who presents with a fall  The patient reports that last night she was walking with her 2 canes when she believes that her right knee gave out from underneath her and she fell backwards on her right side in a seated position  She reported immediate pain in the bilateral hips that she described as a "tight strained" pain  She states that she was able to ambulate after the fall, but she had pain and needed to upgrade to her walker  It was at that time that she decided to come in for evaluation    She reports urinary incontinence, however this is a chronic problem for her  She does report a history of a blood clot in her right leg after a prior joint replacement, but is not on anti-coagulation currently    ED Vital Signs:   ED Triage Vitals   Temperature Pulse Respirations Blood Pressure SpO2   08/16/18 1211 08/16/18 1211 08/16/18 1211 08/16/18 1211 08/16/18 1211   99 1 °F (37 3 °C) 78 18 (!) 178/71 96 %      Temp Source Heart Rate Source Patient Position - Orthostatic VS BP Location FiO2 (%)   08/16/18 1211 08/16/18 1211 08/16/18 1836 08/16/18 1836 --   Oral Monitor Lying Left arm       Pain Score       08/16/18 1323       5        Wt Readings from Last 1 Encounters:   08/16/18 56 7 kg (125 lb)       Vital Signs (abnormal): maximum /71    Abnormal Labs/Diagnostic Test Results:   Bun 18  Creatinine 0 37    Xray right hip - Fractures of bilateral inferior and superior pubic rami as described   Deformity of the right femoral head which appears to be degenerative   If there is any clinical suspicion for an occult right femoral head or neck fracture: Consider CT or preferably MRI     8/17/2018-  Platelets 510  ED Treatment:   Medication Administration from 08/16/2018 1201 to 08/16/2018 1832       Date/Time Order Dose Route Action Comments     08/16/2018 1323 methocarbamol (ROBAXIN) tablet 500 mg 500 mg Oral Given      08/16/2018 1323 ibuprofen (MOTRIN) tablet 600 mg 600 mg Oral Given      08/16/2018 1435 morphine injection 2 mg 2 mg Intravenous Given      08/16/2018 1728 morphine injection 2 mg 2 mg Intravenous Given           Past Medical/Surgical History:    Active Ambulatory Problems     Diagnosis Date Noted    No Active Ambulatory Problems     Resolved Ambulatory Problems     Diagnosis Date Noted    No Resolved Ambulatory Problems     Past Medical History:   Diagnosis Date    Scoliosis        Admitting Diagnosis: Hip injury [S79 919A]  Bilateral pubic rami fractures, closed, initial encounter ([facility] [address] ) [S32 591A, S32 592A]  Closed bilateral fracture of pubic rami, initial encounter ([facility] [address] ) [S32 591A, S32 592A]    Age/Sex: 61 y o  female    Assessment/Plan:   Closed bilateral fracture of pubic rami (HCC)   Assessment & Plan     · POA, noted on x-rays, do not appear to be displaced  Fall seemed to have been mechanical in nature  Pain 3/10 at this time  Uses a walker at baseline to ambulate   ? Admit patient to med/surg under inpatient status   ? Consult orthopedic surgery - do not feel that surgical intervention is needed, but outpatient follow up will be necessary   ? Analgesia  § Tylenol 975 mg PO Q8 scheduled   § Percocet 5/325 mg PO Q4 PRN moderate pain   § Morphine 2 mg IV Q4 severe/breakthrough pain   ? PT/OT          Anxiety and depression   Assessment & Plan     · Stable at this time   ? Continue Cymbalta with Xanax QHS PRN           Urinary incontinence   Assessment & Plan     · Chronic   ? Continue Oxybutynin          Admission Orders:  8/16/2018  1503 INPATIENT   Scheduled Meds:   Current Facility-Administered Medications:  acetaminophen 650 mg Oral Q8H [address]   ALPRAZolam 0 25 mg Oral HS PRN   docusate sodium 100 mg Oral BID   DULoxetine 90 mg Oral Daily   enoxaparin 40 mg Subcutaneous Daily   morphine injection 2 mg Intravenous Q4H PRN   morphine injection 2 mg Intravenous Q4H PRN   ondansetron 4 mg Intravenous Q6H PRN   oxybutynin 10 mg Oral HS   oxyCODONE-acetaminophen 1 tablet Oral Q4H PRN     Continuous Infusions:    PRN Meds:    morphine injection 2 mg iv - used x 3 thus far (1728;  2127;  8235)    oxyCODONE-acetaminophen - used x 2  OTHER ORDERS:  scds  Consult orthopedics  PT/OT    Per orthopedics 8/17- 61 y  o female S/P mechanical fall with bilateral superior and inferior pubic rami fracture  Plan:      · Consider CT of right hip  · NWB RLE pending decision for further imaging and if so results of imaging  · WBAT LLE  · Non-operative treatment of pubic rami fractures planned currently  · Analgesics for pain control  · DVT prophylaxis  · PT/OT eval and treat  · Dispo: Ortho will follow         Thank you,  [address] Utilization Review Department  Phone: [phone]; Fax [phone]  ATTENTION: Please call with any questions or concerns to [phone]  and carefully follow the prompts so that you are directed to the right person  Send all requests for admission clinical reviews, approved or denied determinations and any other requests to fax [phone]   All voicemails are confidential

## 2018-08-17 NOTE — OCCUPATIONAL THERAPY NOTE
[address]  Evaluation     Patient Name: Vanessa Nelson  XSOVV'Z Date: 8/17/2018  Problem List  Patient Active Problem List   Diagnosis    Closed bilateral fracture of pubic rami ([address] )    Anxiety and depression    Urinary incontinence     Past Medical History  Past Medical History:   Diagnosis Date    Scoliosis      Past Surgical History  Past Surgical History:   Procedure Laterality Date    BACK SURGERY  1974    velarde damon in back for scoliosis    JOINT REPLACEMENT      left hip and knee      08/17/18 1227   Note Type   Note type Eval only   Restrictions/Precautions   Weight Bearing Precautions Per Order Yes   RLE Weight Bearing Per Order NWB   LLE Weight Bearing Per Order WBAT   Other Precautions Pain; Fall Risk;WBS; Bed Alarm   Pain Assessment   Pain Assessment 0-10   Pain Score 3   Pain Type Acute pain   Pain Location Groin; Hip   Pain Orientation Bilateral   Effect of Pain on Daily Activities limits I w/ LB ADL and fuctional transfers/ mobility   Patient's Stated Pain Goal No pain   Hospital Pain Intervention(s) Repositioned; Ambulation/increased activity; Emotional support  (RNCindy to provide meds)   Response to Interventions tolerated   Home Living   Type of [address] [place] [address] One level; Other (Comment); Performs ADLs on one level  (2 JJ)   Bathroom Shower/Tub Tub/shower unit   Bathroom Toilet Standard   Bathroom Equipment Toilet raiser;Tub transfer bench   [address] Walker;Cane;Wheelchair-manual;Reacher;Sock aid;Long-handled shoehorn;Grab bars   Additional Comments Pt reports living w/  in [facility] w/ 2 JJ  Pt reports having AE from hip / knee replacement and DME     Prior Function   Level of [place] Independent with ADLs and functional mobility   Lives With Katina Help From Other (Comment)  (spouse, works full time)   ADL Assistance Independent   IADLs Independent   Falls in the last 6 months 1 to 4   Vocational On disability   Comments Pt reports using 2 canes for community mobility, and 1 can vs furniture / wall walk at w/ in house  Pt uses AE when completing LBD   Lifestyle   Autonomy Pt reports I w/ ADL/ IADL PTA including driving  Reciprocal Relationships Supportive and involved  arrived during eval  Pt reports  works full time   Service to Others Pt reports on disability, and reports that she worked at [employer] reports enjoying knitting, and is actively involved w/ [employer]; one for [employer]   ADL   Eating Assistance 6  Modified independent   Eating Deficit Setup   Grooming Assistance 6  Modified Independent  (seated at EOB)   Grooming Deficit Setup   UB Bathing Assistance Unable to assess  (demonstrated ROM, coordination to complete)   LB Bathing Assistance Unable to assess   UB Dressing Assistance 4  Minimal Assistance   UB Dressing Deficit Pull around back; Setup; Increased time to complete   LB Dressing Assistance 2  Maximal Assistance   LB Dressing Deficit Setup;Supervision/safety;Verbal cueing; Increased time to complete; Thread RLE into underwear; Thread LLE into underwear;Pull up over hips;Don/doff R sock; Don/doff L sock  (pt reports use of AE at baseline)   [address]  4  Minimal Assistance   Toileting Deficit Setup; Increased time to complete;Supervison/safety;Verbal cueing; Bedside commode;Clothing management up;Clothing management down   Bed Mobility   Supine to Sit 4  Minimal assistance   Additional items Assist x 1;HOB elevated; Increased time required;LE management;Verbal cues   Sit to Supine Unable to assess   Additional Comments Pt seated at EOB w/ tray table present,  and bed alarm activated  Needs met and call bell in reach   Transfers   Sit to Stand Unable to assess  (difficulty maintain R LE NWB)   Sliding Board transfer 2  Maximal assistance  (bed to drop arm commode to her L)   Additional items Assist x 2; Increased time required;Verbal cues;Armrests   Toilet transfer 2  Maximal assistance   Additional items Assist x 2; Increased time required;Commode;Armrests; Verbal cues; Other  (to bedside commode )   Balance   Static Sitting Good   Dynamic Sitting Fair -   Static Standing Zero   Activity Tolerance   Activity Tolerance Patient limited by pain   Medical Staff Made Aware spoke to PT, [facility]   Nurse Made Aware spoke to RN, Marlys Elizabeth   RUE Assessment   RUE Assessment WFL   RUE Strength   RUE Overall Strength Within Functional Limits - able to perform ADL tasks with strength   LUE Assessment   LUE Assessment WFL   LUE Strength   LUE Overall Strength Within Functional Limits - able to perform ADL tasks with strength   Hand Function   Gross Motor Coordination Functional   Fine Motor Coordination Functional   Sensation   Light Touch No apparent deficits  (B UE)   Sharp/Dull Not tested   Vision-Basic Assessment   Current Vision Wears glasses all the time   Cognition   Overall Cognitive Status Impaired   Arousal/Participation Alert; Cooperative   Attention Attends with cues to redirect   Orientation Level Oriented X4   Memory Decreased recall of precautions   Following Commands Follows one step commands with increased time or repetition   Comments Identified pt by full name and birthdate  Pt able to provide social history  Cues / prompts for consistent recall details  Pt able to verbalize understanding of weight bearing restrictions, but required assist to maintain during functional transfers and ADL performance   Assessment   Limitation Decreased ADL status; Decreased endurance;Decreased Safe judgement during ADL;Decreased self-care trans;Decreased high-level ADLs   Assessment Pt is a 61yo female [facility] on 8/16/2018  Pt presents w/ closed bilateral fracture of pubic rami and signficant PMH impacting her occupational performance including L JOVANI, L TKA  Pt reports living w/ her  in [facility] w/ 2 JJ PTA   Pt reports one cane and furniture / wall for functional mobilty w/ in house and 2 canes for communtiy mobility PTA  Pt reports I w/ ADL/ IADL including driving  Pt required min A to complete bed mobility supine to sit at EOB  Pt required max A to complete LBD to don socks, and don / doff brief  Pt completed functional sliding board transfer bed to drop arm commode w/ max A x2  Pt required min A to complete toileting for clothing mgmt  Pt benefits from cues and assist to consisntently maintain weight bearing precautions  Pt presents w/ increased pain, decreased LE strength, decreased LE ROM, limited insight into deficits, and decreased carryover of precautions impacting her I w/ dressing, bathing, functional transfers, functional mobility, community mobility, clothing mgmt, food prep / clean up  Pt would benefit from OT while in acute care to address deficits  From an OT perspective, pt would benefit from post acute rehab vs home w/ family assist and home OT pending progress in acute care  Will continue to follow   Goals   Patient Goals Pt stated that she would like to be able to go home and walk   Plan   Treatment Interventions ADL retraining;Functional transfer training;Patient/family training;Equipment evaluation/education; Compensatory technique education;Continued evaluation; Activityengagement   Goal Expiration Date 08/24/18   OT Frequency 3-5x/wk   Recommendation   OT Discharge Recommendation Other (Comment)  (post acute rehab vs home pend progress)   Equipment Recommended Other (comment)  (drop arm commode)   OT - OK to Discharge (when medically stable to post acute rehab)   Barthel Index   Feeding 10   Bathing 0   Grooming Score 5   Dressing Score 5   Bladder Score 5   Bowels Score 10   Toilet Use Score 5   Transfers (Bed/Chair) Score 5   Mobility (Level Surface) Score 0   Stairs Score 0   Barthel Index Score 45   Modified [place] Scale   Modified Rocio Scale 4   Pt goals to be met in 7 days:  1   Pt will complete bed mobility supine <> sit w/ mod I to max I w/ ADL performance  2  Pt will complete UBD w/ mod I after set- up to max I w/ ADL performance  3  Pt will complete LBD w/ min A x1 using AE to max I w/ toileting and community mobility to be able to attend [employer] clubs  4  Pt will demonstrate increased activity tolerance and good unsupported sitting balance at EOB for at least 20 minutes to max I w/ ADL performance and improve activity engagement  5  Pt will complete functional sliding board transfers to bed, chair, and commode w/ S   6  Pt will demonstrate good attention and participation in continued evaluation to assess functional mobility using least restrictive device to max I w/ community mobility to return to [employer] clubs  7  Pt will demonstrate good attention and participation in continued evaluation to assess functional standing tolerance to assist in safe discharge planning  8  Pt will demonstrate good attention and verbalize understanding of safety precaution and weight bearing precautions during ADL performance  9   Pt will consistently demonstrate understanding of safety / weight bearing precautions during ADL performance w/ good carryover    Ynes Turk OTR/L

## 2018-08-17 NOTE — PHYSICAL THERAPY NOTE
PHYSICAL THERAPY NOTE  Patient Name: Armando Reilly  XCPEJ'M Date: 8/17/2018  Chart reviewed  Ortho pending   Will follow Katt Brown PT

## 2018-08-17 NOTE — PLAN OF CARE
Problem: PHYSICAL THERAPY ADULT  Goal: Performs mobility at highest level of function for planned discharge setting  See evaluation for individualized goals  Treatment/Interventions: Functional transfer training, LE strengthening/ROM, Elevations, Therapeutic exercise, Endurance training, Cognitive reorientation, Patient/family training, Gait training, Bed mobility, Equipment eval/education, Spoke to nursing  Equipment Recommended: Wheelchair       See flowsheet documentation for full assessment, interventions and recommendations  Outcome: Progressing  Prognosis: Fair  Problem List: Decreased strength, Decreased range of motion, Decreased endurance, Impaired balance, Decreased mobility, Decreased coordination, Orthopedic restrictions, Pain  Assessment: Pt requires similar physical assistance to perform back to bed transfers, and requires >50% verbal instruction or tactile feedback to perform transfers with proper form and technique  Barriers to Discharge: Decreased caregiver support, Inaccessible home environment     Recommendation: Post acute IP rehab          See flowsheet documentation for full assessment

## 2018-08-17 NOTE — PHYSICAL THERAPY NOTE
PHYSICAL THERAPY EVALUATION  NAME:  Valerie Cortes  DATE: 08/17/18    AGE:   61 y o  Mrn:   [id number]  ADMIT DX:  Hip injury [S73 [id number]]  Bilateral pubic rami fractures, closed, initial encounter ([facility] ) [S32 591A, S32 592A]  Closed bilateral fracture of pubic rami, initial encounter ([facility] ) Uriel Whitman, S32 592A]    Past Medical History:   Diagnosis Date    Scoliosis      Length Of Stay: 1  Performed at least 2 patient identifiers during session: Name and Birthday  PHYSICAL THERAPY EVALUATION :    08/17/18 1156   Note Type   Note type Eval/Treat   Pain Assessment   Pain Assessment 0-10   Pain Type Acute pain 3/10   Pain Location Hip;Leg;Pelvis   Effect of Pain on Daily Activities limits speed and indep of mobility, limits arom, hovever pt reports limited pain w/self weight bearing  attemps on rle   Patient's Stated Pain Goal No pain   Hospital Pain Intervention(s) Ambulation/increased activity;Repositioned;Medication (See MAR); Emotional support  (RN medicated pt during session)   Home Living   Type of [address] One level  (2STE, 1 steps from kitchen to laundry)   Home Equipment (Pt used one cane/furniture in house; 2 canes outside  )   Additional Comments Pt also has WC, pivoting wheeled walker   Prior Function   Level of [place] Independent with ADLs and functional mobility   Lives With Spouse   ADL Assistance Independent   Falls in the last 6 months 1 to 4   Vocational On disability   Restrictions/Precautions   Weight Bearing Precautions Per Order Yes   RLE Weight Bearing Per Order NWB  (per ortho progress note)   LLE Weight Bearing Per Order WBAT  (per ortho progress note)   Other Precautions Bed Alarm; Fall Risk;Pain;WBS   General   Additional Pertinent History Xray impression : Total left hip arthroplasty with single acetabular retention screw appears intact  There is flattening and deformation of the right femoral head which appears to be degenerative in etiology    A definite femoral fracture is not identified on this exam   There is a large osteophyte at the right acetabular roof  Nondisplaced fracture right superior pubic ramus near the acetabulum  Nondisplaced comminuted fracture right inferior pubic ramus near the pubic symphysis with caudal displacement of an avulsed cortical fracture fragment  There are also slightly displaced fractures of the left superior and inferior pubic rami near the pubic symphysis  Ortho Assessment:63 y  o female S/P mechanical fall with bilateral superior and inferior pubic rami fracture  Plan: Consider CT of right hip, NWB RLE pending decision for further imaging and if so results of imaging, WBAT LLE, Non-operative treatment of pubic rami fractures planned currently    Family/Caregiver Present Yes  (@ end of eval)   Cognition   Overall Cognitive Status Impaired   Arousal/Participation Alert   Orientation Level Oriented to person;Oriented to place   Following Commands Follows one step commands with increased time or repetition   RUE Strength   RUE Overall Strength Within Functional Limits - able to perform ADL tasks with strength   LUE Strength   LUE Overall Strength Within Functional Limits - able to perform ADL tasks with strength   RLE Overall AROM   R Hip Flexion limited    R Hip ABduction WFL   R Knee Extension WFL   R Ankle Dorsiflexion WFL   Strength RLE   R Hip Flexion 2+/5   R Hip ABduction 2/5   R Hip ADduction 2/5   R Knee Extension (tested to 3)   R Ankle Dorsiflexion 4/5   LLE Overall AROM   L Hip Flexion limited   L Hip ABduction WFL   L Knee Extension WFL   L Ankle Dorsiflexion WFL   Strength LLE   L Hip Flexion 2+/5   L Hip ABduction 2/5   L Hip ADduction 2/5   L Knee Extension (tested to 3)   L Ankle Dorsiflexion 4/5   Coordination   Movements are Fluid and Coordinated 0   Sensation WFL  (vision and hearing)   Light Touch   RLE Light Touch Grossly intact   LLE Light Touch Grossly intact   Bed Mobility   Additional items Assist x 1; Increased time required;Verbal cues; Bedrails;HOB elevated   Transfers   Sit to Stand Unable to assess   Additional items (due to difficulty maintaining NWB on RLE)   Sliding Board transfer 2  Maximal assistance  (L side, downhill)   Additional items Assist x 2; Increased time required;Verbal cues;Armrests   Ambulation/Elevation   Gait pattern Not appropriate  (due to pain and dififculty maintaining WB status)   Balance   Static Sitting Good   Static Standing Zero  (Pt unable to perform complete upright standing)   Endurance Deficit   Endurance Deficit Yes   Endurance Deficit Description Pt reports fatigue with Mobility   Activity Tolerance   Activity Tolerance Patient limited by pain; Patient limited by fatigue   Medical Staff Made Aware spoke to [facility] and Tong Longo from case management   Nurse Made Aware spoke to Amada Jovel RN   Assessment   Prognosis Fair   Problem List Decreased strength;Decreased range of motion;Decreased endurance; Impaired balance;Decreased mobility; Decreased coordination;Orthopedic restrictions;Pain   Barriers to Discharge Decreased caregiver support; Inaccessible home environment   Goals   Patient Goals to be able to go home and walk with weight on legs   STG Expiration Date 08/20/18   Treatment Day 1  (treatment documented in note)   Plan   Treatment/Interventions Functional transfer training;LE strengthening/ROM; Elevations; Therapeutic exercise; Endurance training;Cognitive reorientation;Patient/family training;Gait training;Bed mobility; Equipment eval/education;Spoke to nursing   PT Frequency 5x/wk; Weekend  (1-2 x/weekend depending on definitive ortho plan)   Recommendation   Recommendation Post acute IP rehab   Equipment Recommended Wheelchair   Additional Comments Pt's functional mobility is limited due to weight bearing RLE > pain, plus barriers of JJ and steps on first floor   Barthel Index   Feeding 10   Bathing 0   Grooming Score 5   Dressing Score 5   Bladder Score 5   Bowels Score 10   Toilet Use Score 5   Transfers (Bed/Chair) Score 5   Mobility (Level Surface) Score 0   Stairs Score 0   Barthel Index Score 45   (Please find full objective findings from PT assessment regarding body systems outlined above)  Assessment: Pt is a 61 y o  female seen for PT evaluation s/p admit to [address] [place] [address] on 8/16/2018 w/ Closed bilateral fracture of pubic rami ([address] )  However ortho consult via physician extender has recommended further imaging, NWB on RLE and WBAT on LLE  Order placed for PT  Prior to admission, pt lived with spouse, was on disability, used 1-2 canes vs furniture support for mobility and had 2 JJ, and step on main floor from kitchen to laundry room  Upon evaluation: Pt requires 1person assistance for bed mobilty, 2person assistance for transfers and is currently non amb due to pain and difficulty maintaining NWB RLE  Sherre Soulier Pt's clinical presentation is currently unstable/unpredictable given the functional mobility deficits above, coupled with fall risks including hx of falls, impulsivity, impaired balance and limited WB, prior use of A device, and combined with medical complications of hypertension   Pt to benefit from continued skilled PT tx while in hospital and upon DC to address deficits as defined above and maximize level of functional mobility  From PT/mobility standpoint, recommendation at time of d/c would be inpatient rehab pending progress in order to maximize pt's functional independence and consistency w/ mobility in order to facilitate return to PLOF  Recommend trial with WC next 1-2 sessions, ther ex next 1-2 sessions and further assessment with walker pending definitive ortho plan/further imaging as indicated  Goals: Pt will: Perform bed mobility tasks to modified I to improve ease of bed mobility and increase Indep in prior living environment  Perform pivot vs sliding board transfers to Supervision to decrease risk for falls and increase Indep in prior living environment   Assess ambulation and WC mobility as appropriate and set goals  to increase Indep in prior living environment and demonstrate compliance with WB limitations  Increase B hip flexion and knee ext strength by 1 grade to improve ease of transfers and improve ease of bed mobility    The following objective measures were performed on IE: Barthel Index 45/100  Comorbidities affecting pt's physical performance at time of assessment include: orthopedic surgery and scoliosis, L hip and L knee replacements  Personal factors affecting pt at time of IE include: steps to enter environment, multi-level environment, past experience, inability to perform IADLs, inability to navigate community distances, limited insight into impairments and recent fall(s)  PHYSICAL THERAPY TREATMENT NOTE  Time In: 12:17  Time Out:12:27  Total Time: 10 min  S: Pt agreeable to perform additional transfer training  O:  Transfers from drop arm commode to bed (uphill toward R Side) via slide board max A of 2, taking more then reasonable time  A to maintain NWB, verbal instruction to lean forward, limit WB,  on RLE,  And to press weight through sliding board for ease of transfer  Pt agreeable to sitting on edge of bed while spouse present with call bell in reach   A:  Pt requires similar physical assistance to perform back to bed transfers, and requires >50% verbal instruction or tactile feedback to perform transfers with proper form and technique    P:  Recommend addition of therapeutic exercises to current functional mobility program, await clarification for weight bearing/definitive plan of care on rLE    Gideon Valdez, PT, DPT

## 2018-08-17 NOTE — PLAN OF CARE
Problem: DISCHARGE PLANNING - CARE MANAGEMENT  Goal: Discharge to post-acute care or home with appropriate resources  INTERVENTIONS:  - Conduct assessment to determine patient/family and health care team treatment goals, and need for post-acute services based on payer coverage, community resources, and patient preferences, and barriers to discharge  - Address psychosocial, clinical, and financial barriers to discharge as identified in assessment in conjunction with the patient/family and health care team  - Arrange appropriate level of post-acute services according to patients   needs and preference and payer coverage in collaboration with the physician and health care team  - Communicate with and update the patient/family, physician, and health care team regarding progress on the discharge plan  - Arrange appropriate transportation to post-acute venues  Outcome: Progressing  CM met with Pt and Pt's daughter at bedside  Pt lives with her spouse in a 1 level home with 2 JJ  Pt uses a cane and walker  Pt reports being independent with ambulation and ADL's  Pt does not have a history of Rehab  Pt has had [facility] in the past but not currently  Pt uses [facility] in [place] and has Rx coverage  Pt denies any D&A history  Pt has depression and is prescribed medication by her PCP  Pt does not have a POA and declines information  Pt drives herself  Currently PT recommendation is STR as Pt is NWB  CT Scan is pending and if negative for fracture PT will need to re-eval  CM dept will follow Pt and determine DCP  CM did leave Pt with a SNF list in case she needs STR

## 2018-08-17 NOTE — ASSESSMENT & PLAN NOTE
· POA, noted on x-rays, do not appear to be displaced  Fall seemed to have been mechanical in nature  Pain 3/10 at this time  Uses a 2 canes or a walker at baseline to ambulate   · Ortho consult noted, recommending CT of right hip given deformity of the right femoral head would want to r/o fracture given increased pain on that side s/p fall  No surgical intervention needed for pubic rami fx, outpatient f/u    · F/u on CT Scan  NWB until fracture ruled out  If negative, PT re-eval for therapy recommendations, currently recommending inpatient however this is with NWB status  · DVT prophylaxis    · Pain control with:  · Tylenol 975 mg PO Q8 scheduled   · Percocet 5/325 mg PO Q4 PRN moderate pain   · Morphine 2 mg IV Q4 severe/breakthrough pain

## 2018-08-17 NOTE — PLAN OF CARE
DISCHARGE PLANNING     Discharge to home or other facility with appropriate resources Progressing        PAIN - ADULT     Verbalizes/displays adequate comfort level or baseline comfort level Progressing        Potential for Falls     Patient will remain free of falls Progressing        SAFETY ADULT     Maintain or return to baseline ADL function Progressing     Maintain or return mobility status to optimal level Progressing

## 2018-08-17 NOTE — PLAN OF CARE
Problem: OCCUPATIONAL THERAPY ADULT  Goal: Performs self-care activities at highest level of function for planned discharge setting  See evaluation for individualized goals  Treatment Interventions: ADL retraining, Functional transfer training, Patient/family training, Equipment evaluation/education, Compensatory technique education, Continued evaluation, Activityengagement  Equipment Recommended: Other (comment) (drop arm commode)       See flowsheet documentation for full assessment, interventions and recommendations  Limitation: Decreased ADL status, Decreased endurance, Decreased Safe judgement during ADL, Decreased self-care trans, Decreased high-level ADLs     Assessment: Pt is a 61yo female [facility] to [facility] on 8/16/2018  Pt presents w/ closed bilateral fracture of pubic rami and signficant PMH impacting her occupational performance including L JOVANI, L TKA  Pt reports living w/ her  in [facility] w/ 2 JJ PTA  Pt reports one cane and furniture / wall for functional mobilty w/ in house and 2 canes for communtiy mobility PTA  Pt reports I w/ ADL/ IADL including driving  Pt required min A to complete bed mobility supine to sit at EOB  Pt required max A to complete LBD to don socks, and don / doff brief  Pt completed functional sliding board transfer bed to drop arm commode w/ max A x2  Pt required min A to complete toileting for clothing mgmt  Pt benefits from cues and assist to consisntently maintain weight bearing precautions  Pt presents w/ increased pain, decreased LE strength, decreased LE ROM, limited insight into deficits, and decreased carryover of precautions impacting her I w/ dressing, bathing, functional transfers, functional mobility, community mobility, clothing mgmt, food prep / clean up  Pt would benefit from OT while in acute care to address deficits  From an OT perspective, pt would benefit from post acute rehab vs home w/ family assist and home OT pending progress in acute care   Will continue to follow     OT Discharge Recommendation: Other (Comment) (post acute rehab vs home pend progress)  OT - OK to Discharge:  (when medically stable to post acute rehab)

## 2018-08-17 NOTE — CONSULTS
Orthopedics   David Lemus 61 y o  female MRN: [id number]  Unit/Bed#: -01      Chief Complaint:   bilateral anterior pelvis pain    HPI:   61 y  o female status post fall while walking on sidewalk complaining or right groin pain And bilateral anterior pelvic pain  Patient reports that she was walking on sidewalk, says she  Normally walks with 2 canes, reports she lost her balance and fell  On her sacrum with her back leaning slightly towards the right  She was brought to the ED, had x-rays,  Diagnosed with superior and inferior pubic rami fractures  She reports that she is currently having a constant, aching pain the anterior pelvis, worse in the right side where it also was felt lateral thigh  Says the pain is 8 to 9/10 at worst, about 3 to 4/10 at best   It is worse with any type of movement, better with rest, slightly better with pain medication  She denies any numbness or tingling in the bilateral lower extremities  Denies any back pain, bowel or bladder incontinence  She does report history of left total hip arthroplasty in the remote past     Review Of Systems:   · Skin: Normal  · Neuro: See HPI  · Musculoskeletal: See HPI  · 14 point review of systems negative except as stated above     Past Medical History:   Past Medical History:   Diagnosis Date    Scoliosis        Past Surgical History:   Past Surgical History:   Procedure Laterality Date    BACK SURGERY  1974    velarde damon in back for scoliosis    JOINT REPLACEMENT      left hip and knee       Family History:  Family history reviewed and non-contributory  History reviewed  No pertinent family history  Social History:  Social History     Social History    Marital status: /Civil Union     Spouse name: N/A    Number of children: N/A    Years of education: N/A     Social History Main Topics    Smoking status: Never Smoker    Smokeless tobacco: Never Used    Alcohol use Yes      Comment: very rarely    Drug use:  No  Sexual activity: Not Asked     Other Topics Concern    None     Social History Narrative    None       Allergies:   No Known Allergies        Labs:    0  Lab Value Date/Time   HCT 41 6 08/17/2018 0532   HCT 40 9 08/16/2018 1434   HGB 13 1 08/17/2018 0532   HGB 13 3 08/16/2018 1434   INR 0 99 08/16/2018 1434   WBC 8 58 08/17/2018 0532   WBC 10 05 08/16/2018 1434       Meds:    Current Facility-Administered Medications:     acetaminophen (TYLENOL) tablet 650 mg, 650 mg, Oral, Q8H [address], Yan Suarez PA-C, 650 mg at 08/17/18 0545    ALPRAZolam (XANAX) tablet 0 25 mg, 0 25 mg, Oral, HS PRN, Yan Suarez PA-C    docusate sodium (COLACE) capsule 100 mg, 100 mg, Oral, BID, AMARILIS Flores-C    DULoxetine (CYMBALTA) delayed release capsule 90 mg, 90 mg, Oral, Daily, Yan Suarez PA-C    enoxaparin (LOVENOX) subcutaneous injection 40 mg, 40 mg, Subcutaneous, Daily, AMARILIS Flores-MERARI    morphine injection 2 mg, 2 mg, Intravenous, Q4H PRN, Yan Suarez PA-MERARI    morphine injection 2 mg, 2 mg, Intravenous, Q4H PRN, AMARILIS Tidwell-C, 2 mg at 08/16/18 2127    ondansetron (ZOFRAN) injection 4 mg, 4 mg, Intravenous, Q6H PRN, Yan Suarez PA-C    oxybutynin (DITROPAN-XL) 24 hr tablet 10 mg, 10 mg, Oral, HS, Yan Suarez PA-C, 10 mg at 08/16/18 2127    oxyCODONE-acetaminophen (PERCOCET) 5-325 mg per tablet 1 tablet, 1 tablet, Oral, Q4H PRN, AMARILIS Caceres-C, 1 tablet at 08/17/18 0750    Blood Culture:   No results found for: BLOODCX    Wound Culture:   No results found for: WOUNDCULT    Ins and Outs:  No intake/output data recorded  Physical Exam:   /61 (BP Location: Left arm)   Pulse 75   Temp 97 9 °F (36 6 °C) (Oral)   Resp 18   Ht 5' 1" (1 549 m)   Wt 56 7 kg (125 lb)   SpO2 99%   BMI 23 62 kg/m²   Gen: Alert and oriented to person, place, time  HEENT: EOMI, eyes clear, moist mucus membranes, hearing intact  Respiratory: Bilateral chest rise   No audible wheezing found  Cardiovascular: Regular Rate and Rhythm  Abdomen: soft nontender/nondistended  Musculoskeletal: bilateral lower extremity  · Skin intact,   No erythema or ecchymosis  · Tender to palpation over pubic rami,  As well as over right greater trochanter lateral thigh  ·  negative log roll bilaterally  · Leg lengths equal  To gross inspection  · Painful hip motion,  Right worse with left, chronic right hip weakness with 3/5 strength hip flexion  · Sensation intact L1-S1  · Positive knee flexion/extension ankle dorsi/plantar flexion, EHL/FHL  bilaterally  · 2+ DP pulse    Radiology:   I personally reviewed the films  X-rays pelvis shows bilateral superior and inferior pubic rami fracture, nondisplaced  Status post left total hip arthroplasty with intact hardware - 2 acetabular screws visualized on AP  Degenerative changes of the right femoral head noted with significant arthritic femoroacetabular changes   _*_*_*_*_*_*_*_*_*_*_*_*_*_*_*_*_*_*_*_*_*_*_*_*_*_*_*_*_*_*_*_*_*_*_*_*_*_*_*_*_*    Assessment:  61 y  o female S/P mechanical fall with bilateral superior and inferior pubic rami fracture  Plan:     · Consider CT of right hip  · NWB RLE pending decision for further imaging and if so results of imaging  · WBAT LLE  · Non-operative treatment of pubic rami fractures planned currently  · Analgesics for pain control  · DVT prophylaxis  · PT/OT eval and treat  · Dispo: Ortho will follow      Kevin Chau PA-C

## 2018-08-18 LAB
BASOPHILS # BLD AUTO: 0.04 THOUSANDS/ΜL (ref 0–0.1)
BASOPHILS NFR BLD AUTO: 1 % (ref 0–1)
EOSINOPHIL # BLD AUTO: 0.14 THOUSAND/ΜL (ref 0–0.61)
EOSINOPHIL NFR BLD AUTO: 2 % (ref 0–6)
ERYTHROCYTE [DISTWIDTH] IN BLOOD BY AUTOMATED COUNT: 13.6 % (ref 11.6–15.1)
HCT VFR BLD AUTO: 39.4 % (ref 34.8–46.1)
HGB BLD-MCNC: 12.1 G/DL (ref 11.5–15.4)
IMM GRANULOCYTES # BLD AUTO: 0.03 THOUSAND/UL (ref 0–0.2)
IMM GRANULOCYTES NFR BLD AUTO: 0 % (ref 0–2)
LYMPHOCYTES # BLD AUTO: 2.75 THOUSANDS/ΜL (ref 0.6–4.47)
LYMPHOCYTES NFR BLD AUTO: 31 % (ref 14–44)
MCH RBC QN AUTO: 29.7 PG (ref 26.8–34.3)
MCHC RBC AUTO-ENTMCNC: 30.7 G/DL (ref 31.4–37.4)
MCV RBC AUTO: 97 FL (ref 82–98)
MONOCYTES # BLD AUTO: 0.88 THOUSAND/ΜL (ref 0.17–1.22)
MONOCYTES NFR BLD AUTO: 10 % (ref 4–12)
NEUTROPHILS # BLD AUTO: 4.95 THOUSANDS/ΜL (ref 1.85–7.62)
NEUTS SEG NFR BLD AUTO: 56 % (ref 43–75)
NRBC BLD AUTO-RTO: 0 /100 WBCS
PLATELET # BLD AUTO: 159 THOUSANDS/UL (ref 149–390)
PMV BLD AUTO: 10.8 FL (ref 8.9–12.7)
RBC # BLD AUTO: 4.08 MILLION/UL (ref 3.81–5.12)
WBC # BLD AUTO: 8.79 THOUSAND/UL (ref 4.31–10.16)

## 2018-08-18 PROCEDURE — 99232 SBSQ HOSP IP/OBS MODERATE 35: CPT | Performed by: NURSE PRACTITIONER

## 2018-08-18 PROCEDURE — 97116 GAIT TRAINING THERAPY: CPT

## 2018-08-18 PROCEDURE — 85025 COMPLETE CBC W/AUTO DIFF WBC: CPT | Performed by: NURSE PRACTITIONER

## 2018-08-18 PROCEDURE — 97164 PT RE-EVAL EST PLAN CARE: CPT

## 2018-08-18 PROCEDURE — G8979 MOBILITY GOAL STATUS: HCPCS

## 2018-08-18 PROCEDURE — G8978 MOBILITY CURRENT STATUS: HCPCS

## 2018-08-18 PROCEDURE — G8980 MOBILITY D/C STATUS: HCPCS

## 2018-08-18 RX ORDER — ACETAMINOPHEN 325 MG/1
975 TABLET ORAL EVERY 8 HOURS SCHEDULED
Status: DISCONTINUED | OUTPATIENT
Start: 2018-08-18 | End: 2018-08-21 | Stop reason: HOSPADM

## 2018-08-18 RX ORDER — OXYCODONE HYDROCHLORIDE 5 MG/1
5 TABLET ORAL EVERY 4 HOURS PRN
Status: DISCONTINUED | OUTPATIENT
Start: 2018-08-18 | End: 2018-08-20

## 2018-08-18 RX ORDER — SENNOSIDES 8.6 MG
1 TABLET ORAL
Status: DISCONTINUED | OUTPATIENT
Start: 2018-08-18 | End: 2018-08-21 | Stop reason: HOSPADM

## 2018-08-18 RX ORDER — KETOROLAC TROMETHAMINE 30 MG/ML
30 INJECTION, SOLUTION INTRAMUSCULAR; INTRAVENOUS EVERY 8 HOURS
Status: COMPLETED | OUTPATIENT
Start: 2018-08-18 | End: 2018-08-19

## 2018-08-18 RX ORDER — LIDOCAINE 50 MG/G
2 PATCH TOPICAL DAILY
Status: DISCONTINUED | OUTPATIENT
Start: 2018-08-18 | End: 2018-08-21

## 2018-08-18 RX ORDER — KETOROLAC TROMETHAMINE 30 MG/ML
15 INJECTION, SOLUTION INTRAMUSCULAR; INTRAVENOUS EVERY 6 HOURS SCHEDULED
Status: DISCONTINUED | OUTPATIENT
Start: 2018-08-18 | End: 2018-08-18

## 2018-08-18 RX ORDER — METHOCARBAMOL 500 MG/1
500 TABLET, FILM COATED ORAL EVERY 6 HOURS PRN
Status: DISCONTINUED | OUTPATIENT
Start: 2018-08-18 | End: 2018-08-21 | Stop reason: HOSPADM

## 2018-08-18 RX ORDER — KETOROLAC TROMETHAMINE 30 MG/ML
30 INJECTION, SOLUTION INTRAMUSCULAR; INTRAVENOUS EVERY 6 HOURS SCHEDULED
Status: DISCONTINUED | OUTPATIENT
Start: 2018-08-18 | End: 2018-08-18

## 2018-08-18 RX ADMIN — MORPHINE SULFATE 2 MG: 2 INJECTION, SOLUTION INTRAMUSCULAR; INTRAVENOUS at 00:29

## 2018-08-18 RX ADMIN — MORPHINE SULFATE 2 MG: 2 INJECTION, SOLUTION INTRAMUSCULAR; INTRAVENOUS at 00:30

## 2018-08-18 RX ADMIN — ALPRAZOLAM 0.25 MG: 0.25 TABLET ORAL at 12:41

## 2018-08-18 RX ADMIN — OXYCODONE HYDROCHLORIDE AND ACETAMINOPHEN 1 TABLET: 5; 325 TABLET ORAL at 05:11

## 2018-08-18 RX ADMIN — ENOXAPARIN SODIUM 40 MG: 40 INJECTION SUBCUTANEOUS at 08:50

## 2018-08-18 RX ADMIN — OXYBUTYNIN CHLORIDE 10 MG: 5 TABLET, EXTENDED RELEASE ORAL at 21:18

## 2018-08-18 RX ADMIN — ACETAMINOPHEN 975 MG: 325 TABLET, FILM COATED ORAL at 21:18

## 2018-08-18 RX ADMIN — DOCUSATE SODIUM 100 MG: 100 CAPSULE, LIQUID FILLED ORAL at 17:20

## 2018-08-18 RX ADMIN — ACETAMINOPHEN 650 MG: 325 TABLET, FILM COATED ORAL at 05:06

## 2018-08-18 RX ADMIN — ACETAMINOPHEN 975 MG: 325 TABLET, FILM COATED ORAL at 13:11

## 2018-08-18 RX ADMIN — KETOROLAC TROMETHAMINE 30 MG: 30 INJECTION, SOLUTION INTRAMUSCULAR at 13:11

## 2018-08-18 RX ADMIN — LIDOCAINE 2 PATCH: 50 PATCH CUTANEOUS at 13:17

## 2018-08-18 RX ADMIN — OXYCODONE HYDROCHLORIDE AND ACETAMINOPHEN 1 TABLET: 5; 325 TABLET ORAL at 09:22

## 2018-08-18 RX ADMIN — METHOCARBAMOL 500 MG: 500 TABLET ORAL at 13:11

## 2018-08-18 RX ADMIN — POLYETHYLENE GLYCOL 3350 17 G: 17 POWDER, FOR SOLUTION ORAL at 08:51

## 2018-08-18 RX ADMIN — KETOROLAC TROMETHAMINE 30 MG: 30 INJECTION, SOLUTION INTRAMUSCULAR at 20:34

## 2018-08-18 RX ADMIN — DOCUSATE SODIUM 100 MG: 100 CAPSULE, LIQUID FILLED ORAL at 08:51

## 2018-08-18 RX ADMIN — OXYCODONE HYDROCHLORIDE 5 MG: 5 TABLET ORAL at 21:38

## 2018-08-18 RX ADMIN — DULOXETINE HYDROCHLORIDE 90 MG: 60 CAPSULE, DELAYED RELEASE ORAL at 08:49

## 2018-08-18 RX ADMIN — METHOCARBAMOL 500 MG: 500 TABLET ORAL at 18:51

## 2018-08-18 RX ADMIN — MORPHINE SULFATE 2 MG: 2 INJECTION, SOLUTION INTRAMUSCULAR; INTRAVENOUS at 10:10

## 2018-08-18 RX ADMIN — SENNOSIDES 8.6 MG: 8.6 TABLET, FILM COATED ORAL at 21:18

## 2018-08-18 NOTE — PHYSICAL THERAPY NOTE
PHYSICAL THERAPY RE EVALNOTE  NAME:  Cayden Gregg  DATE: 08/18/18    Length Of Stay: 2  Performed at least 2 patient identifiers during session: Name and Birthday    TREATMENT:      08/18/18 1054   Note Type   Note type Re-eval;Progress   Pain Assessment   Pain Assessment 0-10   Pain Score 3   Pain Location Hip;Pelvis   Pain Orientation Bilateral   Effect of Pain on Daily Activities limits speed and indep of mobility, especially with bed mobility and transfers   Patient's Stated Pain Goal No pain   Restrictions/Precautions   Weight Bearing Precautions Per Order Yes   RLE Weight Bearing Per Order WBAT  (per ortho progress note)   LLE Weight Bearing Per Order WBAT  (recently upgraded post CT)   General   Additional Pertinent History ORtho reviewed CT of hip, now recommending WBAT BLEs   Family/Caregiver Present No   Cognition   Overall Cognitive Status Impaired   Orientation Level Oriented to person;Oriented to place   Following Commands Follows one step commands with increased time or repetition   RUE Strength   RUE Overall Strength Within Functional Limits - able to perform ADL tasks with strength   LUE Strength   LUE Overall Strength Within Functional Limits - able to perform ADL tasks with strength   RLE Overall AROM   R Hip Flexion limited   R Hip ABduction WFL   R Knee Extension WFL   R Ankle Dorsiflexion WFL   LLE Overall AROM   L Hip Flexion limited   L Hip ABduction WFL   L Knee Extension WFL   L Ankle Dorsiflexion WFL   L Ankle Plantar Flexion WFL   Coordination   Movements are Fluid and Coordinated 0   Sensation WFL  (vision and hearing)   Light Touch   RLE Light Touch Grossly intact   LLE Light Touch Grossly intact   Transfers   Sit to Stand 4  Minimal assistance  (wide GAURAV)   Additional items Assist x 1; Increased time required;Verbal cues;Armrests   Stand to Sit 5  Supervision   Additional items Assist x 1; Increased time required;Verbal cues;Armrests   Ambulation/Elevation   Gait pattern Antalgic; Ataxia; Excessively slow; Wide GAURAV   Gait Assistance 5  Supervision   Additional items Assist x 1;Verbal cues   Assistive Device Rolling walker  (pt's own pivoting wheels rolling walker)   Distance 15'   Balance   Static Sitting Good   Static Standing Fair -   Ambulatory Fair -   Endurance Deficit   Endurance Deficit Yes   Endurance Deficit Description limited amb distancelimited amb distance   Activity Tolerance   Activity Tolerance Patient limited by pain; Patient limited by fatigue   Nurse Made Aware spoke to Rodger Corrigan RN and Miranda Buchanan to Rodger Corrigan RN and Karen Pennington PCA   Assessment   Prognosis Fair   Problem List Decreased strength;Decreased range of motion;Decreased endurance; Impaired balance;Decreased mobility; Decreased coordination;Orthopedic restrictions;Pain   Assessment Pt able to perform ambulation and transfers with increased WB status, but still needs intermittent min A for transfers, and will need to perform stairs upon DC  Recommend trials on stairs prior to DC  Goals updated to reflect progress/ and increase in WB status   Barriers to Discharge Decreased caregiver support; Inaccessible home environment   Goals   Patient Goals to go home and walkto go home and walk   STG Expiration Date 08/20/18   Short Term Goal #1 Goals: Pt will: Perform bed mobility tasks to modified I to improve ease of bed mobility and increase Indep in prior living environment  Perform sit to stand transfers to Supervision to decrease risk for falls and increase Indep in prior living environment  Amb with rolling walker for >25' with S to increase Indep in prior living environment and demonstrate compliance with WB limitations  Perform 1 step with A of 1 to promote  performing JJ home  Increase B hip flexion and knee ext strength by 1 grade to improve ease of transfers and improve ease of bed mobility   Treatment Day 1   Plan   Treatment/Interventions Functional transfer training;LE strengthening/ROM; Elevations; Therapeutic exercise; Endurance training;Cognitive reorientation;Equipment eval/education; Bed mobility;Gait training;Patient/family training;Spoke to nursing   PT Frequency 5x/wk  (1-2 x/weekend depending on definitive ortho plan)   Recommendation   Recommendation Post acute IP rehab vs HHPT   Equipment Recommended Wheelchair     Darien Archer, PT, DPT

## 2018-08-18 NOTE — PLAN OF CARE
Problem: DISCHARGE PLANNING - CARE MANAGEMENT  Goal: Discharge to post-acute care or home with appropriate resources  INTERVENTIONS:  - Conduct assessment to determine patient/family and health care team treatment goals, and need for post-acute services based on payer coverage, community resources, and patient preferences, and barriers to discharge  - Address psychosocial, clinical, and financial barriers to discharge as identified in assessment in conjunction with the patient/family and health care team  - Arrange appropriate level of post-acute services according to patient's   needs and preference and payer coverage in collaboration with the physician and health care team  - Communicate with and update the patient/family, physician, and health care team regarding progress on the discharge plan  - Arrange appropriate transportation to post-acute venues   Outcome: Progressing  CM was asked by JULI to meet with patient as after speaking with her she is now agreeable to STR  Patient has her choices  Her first choice is [facility]FM  Her next two choices are [facility] and [facility]  CM will submit referrals and continue to follow  CM explained to patient that she will require an insurance auth to go to STR and this will most likely not be able to happen until Monday  CM dept will continue to follow for discharge coordination

## 2018-08-18 NOTE — PROGRESS NOTES
[facility]    Progress Note - Miguel A Cortes 1955, 61 y o  female MRN: [id number]    Unit/Bed#: -[facility]lene Encounter: [id number]    Primary Care Provider: Camila Manuel MD   Date and time admitted to hospital: 8/16/2018 12:11 PM    * Closed bilateral fracture of pubic rami [facility])   Assessment & Plan    · POA, noted on x-rays, do not appear to be displaced  Fall seemed to have been mechanical in nature  Pain 3/10 at this time  Uses a 2 canes or a walker at baseline to ambulate CT of right hip negative for fx  · Ortho following,No surgical intervention needed for pubic rami fx, outpatient f/u  Anticipate gradual resolution of pain over the next 3 months with conservative measures/symptomatic treatment  · WBAT BLLE  PT recommending inpatient rehab, patient and  agreeable  CM aware  · DVT prophylaxis  · Patient reporting increased pain today, will change regimen:   · Increase Tylenol 975 mg PO Q8 scheduled  D/C Percocet  · Add Oxycodone 5 PO Q4 PRN moderate pain   · Add Robaxin 500 mg Q6 hours  · Add Lidocaine patches x 2 to lower back  · Add Toradol 30 mg Q8 hours x 4 doses  · Bowel regimen        Anxiety and depression   Assessment & Plan    · Stable at this time   · Continue Cymbalta with Xanax BID PRN         Urinary incontinence   Assessment & Plan    · Chronic   · Continue Oxybutynin             Pharmacologic: Enoxaparin (Lovenox)  Mechanical VTE Prophylaxis in Place: Yes    Patient Centered Rounds: I have evaluated patient without nursing staff present due to RN unavailable, discussed in person outside of room  Discussions with Specialists or Other Care Team Provider: nursing, case management, PT    Education and Discussions with Family / Patient: Patient and  at bedside  Time Spent for Care: 45 minutes  More than 50% of total time spent on counseling and coordination of care as described above      Current Length of Stay: 2 day(s)    Current Patient Status: Inpatient   Certification Statement: The patient will continue to require additional inpatient hospital stay due to pain control, discharge planning to inpatient rehab  Discharge Plan / Estimated Discharge Date: Pending better pain control and discharge plan to acute rehab      Code Status: Level 1 - Full Code      Subjective:   Patient continues to complain of pain and tightness in her lower back radiating into her groin  No tingling or numbness  She does have bladder incontinence but this is chronic  She is concerned about going home because her  works full time and she will be alone most of the time  She had a lot of difficulty getting out of bed this morning and to the bathroom  Objective:     Vitals:   Temp (24hrs), Av 3 °F (36 8 °C), Min:98 2 °F (36 8 °C), Max:98 3 °F (36 8 °C)    HR:  [74-89] 89  Resp:  [16-18] 16  BP: (106-125)/(57-58) 125/58  SpO2:  [92 %-96 %] 93 %  Body mass index is 23 62 kg/m²  Input and Output Summary (last 24 hours): Intake/Output Summary (Last 24 hours) at 18 1307  Last data filed at 18 1131   Gross per 24 hour   Intake              600 ml   Output                0 ml   Net              600 ml       Physical Exam:     Physical Exam   Constitutional: She is oriented to person, place, and time  No distress  HENT:   Head: Normocephalic  Eyes: Pupils are equal, round, and reactive to light  Neck: Normal range of motion  Cardiovascular: Normal rate, regular rhythm and intact distal pulses  No murmur heard  Pulmonary/Chest: Effort normal and breath sounds normal    Abdominal: Soft  Bowel sounds are normal    Musculoskeletal: Normal range of motion  She exhibits no edema  Neurological: She is alert and oriented to person, place, and time  Skin: Skin is warm and dry  Psychiatric: She has a normal mood and affect  Nursing note and vitals reviewed        Additional Data:     Labs:      Results from last 7 days  Lab Units 08/18/18  0543   WBC Thousand/uL 8 79   HEMOGLOBIN g/dL 12 1   HEMATOCRIT % 39 4   PLATELETS Thousands/uL 159   NEUTROS PCT % 56   LYMPHS PCT % 31   MONOS PCT % 10   EOS PCT % 2       Results from last 7 days  Lab Units 08/16/18  1434   SODIUM mmol/L 137   POTASSIUM mmol/L 4 1   CHLORIDE mmol/L 103   CO2 mmol/L 30   BUN mg/dL 18   CREATININE mg/dL 0 37*   CALCIUM mg/dL 8 8   GLUCOSE RANDOM mg/dL 124       Results from last 7 days  Lab Units 08/16/18  1434   INR  0 99         Recent Cultures (last 7 days):           Last 24 Hours Medication List:     Current Facility-Administered Medications:  acetaminophen 975 mg Oral Q8H [facility] JUAN CARLOS Rivera   ALPRAZolam 0 25 mg Oral BID PRN Linette Lewis, JUAN CARLOS   docusate sodium 100 mg Oral BID [facility] Mary[facility], EMANUEL   DULoxetine 90 mg Oral Daily Yan Suarez, EMANUEL   enoxaparin 40 mg Subcutaneous Daily Yanniko Rueda, EMANUEL   ketorolac 30 mg Intravenous Q8H Linette Lewis, JUAN CARLOS   lidocaine 2 patch Transdermal Daily JUAN CARLOS Rivera   methocarbamol 500 mg Oral Q6H PRN Linette Lewis, JUAN CARLOS   ondansetron 4 mg Intravenous Q6H PRN Yanniko Rueda, EMANUEL   oxybutynin 10 mg Oral HS Yan Suarez, EMANUEL   oxyCODONE 5 mg Oral Q4H PRN Linette Lewis, JUAN CARLOS   polyethylene glycol 17 g Oral Daily JUAN CARLOS Lwe        Today, Patient Was Seen By: JUAN CARLOS Wong    ** Please Note: Dragon 360 Dictation voice to text software may have been used in the creation of this document   **

## 2018-08-18 NOTE — PROGRESS NOTES
I reviewed the CT scan of the right hip from 8/17/18  Bilateral superior and inferior pubic rami fractures as identified on x-rays  Severe degenerative changes in the right hip with small effusion but no fracture  Plan from orthopedic perspective is unchanged:  1  WBAT BLE with assistive device as needed  2  PT/OT  3  DVT prophylaxis  4  May follow-up with primary care physician or orthopedics in approximately 6 weeks if not improving  Anticipate gradual resolution of pain over the next 3 months with conservative measures/symptomatic treatment

## 2018-08-18 NOTE — SOCIAL WORK
CM was asked by JULI to meet with patient as after speaking with her she is now agreeable to STR  Patient has her choices  Her first choice is [facility]FM  Her next two choices are [facility] and [facility]  CM will submit referrals and continue to follow  CM explained to patient that she will require an insurance auth to go to STR and this will most likely not be able to happen until Monday  CM dept will continue to follow for discharge coordination

## 2018-08-18 NOTE — PLAN OF CARE
Problem: PHYSICAL THERAPY ADULT  Goal: Performs mobility at highest level of function for planned discharge setting  See evaluation for individualized goals  Treatment/Interventions: Functional transfer training, LE strengthening/ROM, Elevations, Therapeutic exercise, Endurance training, Cognitive reorientation, Patient/family training, Gait training, Bed mobility, Equipment eval/education, Spoke to nursing  Equipment Recommended: Wheelchair       See flowsheet documentation for full assessment, interventions and recommendations  Outcome: Progressing  Prognosis: Fair  Problem List: Decreased strength, Decreased range of motion, Decreased endurance, Impaired balance, Decreased mobility, Decreased coordination, Orthopedic restrictions, Pain  Assessment: Pt required less physical A for transfers but needed more than reasonable time for transfers  Pt was able to perform JJ, but only could perform 1 step and needed to perform with anterior-posterior approach  And physical A to lift LLE  Pt reports having anxiety with performing steps (more with anticipate pain per pt), and reports she can have spouse "bump her up" steps with their [employer] [address] that they have used in the past  Pt has met most of her PT goals , but has verbally declined HHPT after my suggestion x 2   If pt not DC recommend progression with mobility and HEP For LE strengthening  Barriers to Discharge: Decreased caregiver support, Inaccessible home environment     Recommendation: Post acute IP rehab          See flowsheet documentation for full assessment

## 2018-08-18 NOTE — PLAN OF CARE
DISCHARGE PLANNING     Discharge to home or other facility with appropriate resources Progressing        DISCHARGE PLANNING - CARE MANAGEMENT     Discharge to post-acute care or home with appropriate resources Progressing        PAIN - ADULT     Verbalizes/displays adequate comfort level or baseline comfort level Progressing        Potential for Falls     Patient will remain free of falls Progressing        SAFETY ADULT     Maintain or return to baseline ADL function Progressing     Maintain or return mobility status to optimal level Progressing

## 2018-08-18 NOTE — PHYSICAL THERAPY NOTE
PHYSICAL THERAPY TREATMENT NOTE  NAME:  Gracie Andersen  DATE: 08/18/18    Length Of Stay: 2  Performed at least 2 patient identifiers during session: Name and Birthday    TREATMENT:      08/18/18 1131   Note Type   Note type Re-eval;Progress   Pain Assessment   Pain Assessment 0-10   Pain Score 3   Pain Location Hip;Pelvis   Pain Orientation Bilateral   Effect of Pain on Daily Activities limits speed and indep of mobility, especially with bed mobility and transfers   Patient's Stated Pain Goal No pain   Hospital Pain Intervention(s) Medication (See MAR); Repositioned; Ambulation/increased activity; Emotional support   Restrictions/Precautions   Weight Bearing Precautions Per Order Yes   RLE Weight Bearing Per Order WBAT  (per ortho progress note)   LLE Weight Bearing Per Order WBAT  (recently upgraded post CT)   General   Family/Caregiver Present No   Cognition   Overall Cognitive Status Impaired   Orientation Level Oriented to person;Oriented to place   Following Commands Follows one step commands with increased time or repetition   RUE Strength   RUE Overall Strength Within Functional Limits - able to perform ADL tasks with strength   LUE Strength   LUE Overall Strength Within Functional Limits - able to perform ADL tasks with strength   Transfers   Sit to Stand 5  Supervision  (wide GAURAV)   Additional items Assist x 1; Increased time required;Verbal cues  (taking more than reasonable time--about 10 sec to complete)   Stand to Sit 5  Supervision   Additional items Assist x 1; Increased time required;Verbal cues;Armrests   Ambulation/Elevation   Gait pattern Antalgic; Ataxia; Excessively slow; Wide GAURAV   Gait Assistance 5  Supervision   Additional items Assist x 1;Verbal cues   Assistive Device Rolling walker  (pt's own pivoting wheels rolling walker)   Distance 40'   Stair Management Assistance 4  Minimal assist  (steadying A And to lift LLE)   Additional items Assist x 1;Verbal cues   Stair Management Technique Step to pattern; One rail R  (ascend forward, descend backward)   Number of Stairs 1   Endurance Deficit   Endurance Deficit Yes   Endurance Deficit Description limited amb distance   Activity Tolerance   Activity Tolerance Patient limited by pain; Patient limited by fatigue   Medical Staff Made Aware spoke to [address] from case mangement, including re: pt's benefitting from [address] but will likely decline   Nurse Made Aware spoke to Олег Renner RN and [address] PCA   Assessment   Prognosis Fair   Problem List Decreased strength;Decreased range of motion;Decreased endurance; Impaired balance;Decreased mobility; Decreased coordination;Orthopedic restrictions;Pain   Assessment Pt required less physical A for transfers but needed more than reasonable time for transfers  Pt was able to perform JJ, but only could perform 1 step and needed to perform with anterior-posterior approach  And physical A to lift LLE  Pt reports having anxiety with performing steps (more with anticipate pain per pt), and reports she can have spouse "bump her up" steps with their [facility] that they have used in the past  Pt has met most of her PT goals , but has verbally declined HHPT after my suggestion x 2   If pt not DC recommend progression with mobility and HEP For LE strengthening  Barriers to Discharge Decreased caregiver support; Inaccessible home environment   Goals   Patient Goals to go home and walk   STG Expiration Date 08/20/18   Treatment Day 2   Plan   Treatment/Interventions Functional transfer training;LE strengthening/ROM; Elevations; Therapeutic exercise; Endurance training;Cognitive reorientation;Equipment eval/education; Bed mobility;Gait training;Patient/family training;Spoke to nursing   PT Frequency 5x/wk  (1-2 x/weekend )   Recommendation   Recommendation Post acute IP rehab vs home w/HHPT   Equipment Recommended Wheelchair   Barthel Index   Feeding 10   Bathing 0   Grooming Score 5   Dressing Score 5   Bladder Score 10   Bowels Score 10   Toilet Use Score 5   Transfers (Bed/Chair) Score 10   Mobility (Level Surface) Score 0   Stairs Score 5   Barthel Index Score 60     Nancy Swain, PT, DPT

## 2018-08-18 NOTE — ASSESSMENT & PLAN NOTE
· POA, noted on x-rays, do not appear to be displaced  Fall seemed to have been mechanical in nature  Pain 3/10 at this time  Uses a 2 canes or a walker at baseline to ambulate CT of right hip negative for fx  · Ortho following,No surgical intervention needed for pubic rami fx, outpatient f/u  Anticipate gradual resolution of pain over the next 3 months with conservative measures/symptomatic treatment  · WBAT BLLE  PT recommending inpatient rehab, patient and  agreeable  CM aware  · DVT prophylaxis  · Patient reporting increased pain today, will change regimen:   · Increase Tylenol 975 mg PO Q8 scheduled  D/C Percocet  · Add Oxycodone 5 PO Q4 PRN moderate pain   · Add Robaxin 500 mg Q6 hours  · Add Lidocaine patches x 2 to lower back  · Add Toradol 30 mg Q8 hours x 4 doses     · Bowel regimen

## 2018-08-19 LAB
ANION GAP SERPL CALCULATED.3IONS-SCNC: 3 MMOL/L (ref 4–13)
BASOPHILS # BLD AUTO: 0.04 THOUSANDS/ΜL (ref 0–0.1)
BASOPHILS NFR BLD AUTO: 1 % (ref 0–1)
BUN SERPL-MCNC: 20 MG/DL (ref 5–25)
CALCIUM SERPL-MCNC: 8.4 MG/DL (ref 8.3–10.1)
CHLORIDE SERPL-SCNC: 106 MMOL/L (ref 100–108)
CO2 SERPL-SCNC: 28 MMOL/L (ref 21–32)
CREAT SERPL-MCNC: 0.27 MG/DL (ref 0.6–1.3)
EOSINOPHIL # BLD AUTO: 0.13 THOUSAND/ΜL (ref 0–0.61)
EOSINOPHIL NFR BLD AUTO: 2 % (ref 0–6)
ERYTHROCYTE [DISTWIDTH] IN BLOOD BY AUTOMATED COUNT: 13.2 % (ref 11.6–15.1)
GFR SERPL CREATININE-BSD FRML MDRD: 127 ML/MIN/1.73SQ M
GLUCOSE SERPL-MCNC: 104 MG/DL (ref 65–140)
HCT VFR BLD AUTO: 37.7 % (ref 34.8–46.1)
HGB BLD-MCNC: 11.9 G/DL (ref 11.5–15.4)
IMM GRANULOCYTES # BLD AUTO: 0.02 THOUSAND/UL (ref 0–0.2)
IMM GRANULOCYTES NFR BLD AUTO: 0 % (ref 0–2)
LYMPHOCYTES # BLD AUTO: 2.04 THOUSANDS/ΜL (ref 0.6–4.47)
LYMPHOCYTES NFR BLD AUTO: 29 % (ref 14–44)
MCH RBC QN AUTO: 30 PG (ref 26.8–34.3)
MCHC RBC AUTO-ENTMCNC: 31.6 G/DL (ref 31.4–37.4)
MCV RBC AUTO: 95 FL (ref 82–98)
MONOCYTES # BLD AUTO: 0.68 THOUSAND/ΜL (ref 0.17–1.22)
MONOCYTES NFR BLD AUTO: 10 % (ref 4–12)
NEUTROPHILS # BLD AUTO: 4.03 THOUSANDS/ΜL (ref 1.85–7.62)
NEUTS SEG NFR BLD AUTO: 58 % (ref 43–75)
NRBC BLD AUTO-RTO: 0 /100 WBCS
PLATELET # BLD AUTO: 158 THOUSANDS/UL (ref 149–390)
PMV BLD AUTO: 10.9 FL (ref 8.9–12.7)
POTASSIUM SERPL-SCNC: 3.8 MMOL/L (ref 3.5–5.3)
RBC # BLD AUTO: 3.97 MILLION/UL (ref 3.81–5.12)
SODIUM SERPL-SCNC: 137 MMOL/L (ref 136–145)
WBC # BLD AUTO: 6.94 THOUSAND/UL (ref 4.31–10.16)

## 2018-08-19 PROCEDURE — 99232 SBSQ HOSP IP/OBS MODERATE 35: CPT | Performed by: NURSE PRACTITIONER

## 2018-08-19 PROCEDURE — 80048 BASIC METABOLIC PNL TOTAL CA: CPT | Performed by: INTERNAL MEDICINE

## 2018-08-19 PROCEDURE — 85025 COMPLETE CBC W/AUTO DIFF WBC: CPT | Performed by: INTERNAL MEDICINE

## 2018-08-19 RX ADMIN — POLYETHYLENE GLYCOL 3350 17 G: 17 POWDER, FOR SOLUTION ORAL at 08:34

## 2018-08-19 RX ADMIN — OXYCODONE HYDROCHLORIDE 5 MG: 5 TABLET ORAL at 20:34

## 2018-08-19 RX ADMIN — METHOCARBAMOL 500 MG: 500 TABLET ORAL at 16:42

## 2018-08-19 RX ADMIN — ALPRAZOLAM 0.25 MG: 0.25 TABLET ORAL at 12:51

## 2018-08-19 RX ADMIN — DOCUSATE SODIUM 100 MG: 100 CAPSULE, LIQUID FILLED ORAL at 08:34

## 2018-08-19 RX ADMIN — METHOCARBAMOL 500 MG: 500 TABLET ORAL at 01:08

## 2018-08-19 RX ADMIN — DOCUSATE SODIUM 100 MG: 100 CAPSULE, LIQUID FILLED ORAL at 16:42

## 2018-08-19 RX ADMIN — OXYBUTYNIN CHLORIDE 10 MG: 5 TABLET, EXTENDED RELEASE ORAL at 21:30

## 2018-08-19 RX ADMIN — ACETAMINOPHEN 975 MG: 325 TABLET, FILM COATED ORAL at 05:23

## 2018-08-19 RX ADMIN — KETOROLAC TROMETHAMINE 30 MG: 30 INJECTION, SOLUTION INTRAMUSCULAR at 05:23

## 2018-08-19 RX ADMIN — LIDOCAINE 2 PATCH: 50 PATCH CUTANEOUS at 08:34

## 2018-08-19 RX ADMIN — ACETAMINOPHEN 975 MG: 325 TABLET, FILM COATED ORAL at 21:30

## 2018-08-19 RX ADMIN — ACETAMINOPHEN 975 MG: 325 TABLET, FILM COATED ORAL at 12:52

## 2018-08-19 RX ADMIN — METHOCARBAMOL 500 MG: 500 TABLET ORAL at 08:49

## 2018-08-19 RX ADMIN — ENOXAPARIN SODIUM 40 MG: 40 INJECTION SUBCUTANEOUS at 08:34

## 2018-08-19 RX ADMIN — METHOCARBAMOL 500 MG: 500 TABLET ORAL at 23:19

## 2018-08-19 RX ADMIN — DULOXETINE HYDROCHLORIDE 90 MG: 60 CAPSULE, DELAYED RELEASE ORAL at 08:34

## 2018-08-19 RX ADMIN — OXYCODONE HYDROCHLORIDE 5 MG: 5 TABLET ORAL at 05:30

## 2018-08-19 RX ADMIN — KETOROLAC TROMETHAMINE 30 MG: 30 INJECTION, SOLUTION INTRAMUSCULAR at 12:51

## 2018-08-19 RX ADMIN — SENNOSIDES 8.6 MG: 8.6 TABLET, FILM COATED ORAL at 21:30

## 2018-08-19 NOTE — PROGRESS NOTES
[facility]    Progress Note - Colleen Contreras 1955, 61 y o  female MRN: [id number]    Unit/Bed#: -01 Encounter: [id number]    Primary Care Provider: Minnie Gil MD   Date and time admitted to hospital: 8/16/2018 12:11 PM    * Closed bilateral fracture of pubic rami [facility])   Assessment & Plan    · POA, noted on x-rays, do not appear to be displaced  Fall seemed to have been mechanical in nature  Pain 3/10 at this time  Uses a 2 canes or a walker at baseline to ambulate CT of right hip negative for fx  · Ortho following,No surgical intervention needed for pubic rami fx, outpatient f/u  Anticipate gradual resolution of pain over the next 3 months with conservative measures/symptomatic treatment  · WBAT BLLE  PT recommending inpatient rehab, patient and  agreeable  CM aware, referrals and auth pending  · DVT prophylaxis  · Patient reporting better pain control today, continue with current pain regimen:  · Tylenol 975 mg PO Q8 scheduled  · Oxycodone 5 PO Q4 PRN moderate pain   · Robaxin 500 mg Q6 hours  · Lidocaine patches x 2 to lower back  · Toradol 30 mg Q8 hours x 4 doses, last dose this afternoon  · Bowel regimen: Colace, Miralax, Senna HS        Anxiety and depression   Assessment & Plan    · Stable at this time   · Continue Cymbalta with Xanax BID PRN         Urinary incontinence   Assessment & Plan    · Chronic   · Continue Oxybutynin           Pharmacologic: Enoxaparin (Lovenox)  Mechanical VTE Prophylaxis in Place: Yes    Patient Centered Rounds: I have performed bedside rounds with nursing staff today  Discussions with Specialists or Other Care Team Provider: nursing     Education and Discussions with Family / Patient: Patient     Time Spent for Care: 30 minutes  More than 50% of total time spent on counseling and coordination of care as described above  Current Length of Stay: 3 day(s)    Current Patient Status: Inpatient   Certification Statement:  The patient will continue to require additional inpatient hospital stay due to pain control, discharge planning to rehab  Discharge Plan / Estimated Discharge Date: Pending auth, rehab bed  CM following  Code Status: Level 1 - Full Code      Subjective:   Patient states that pain is better controlled today, feels that lidocaine patches and robaxin are what has made pain significantly better  Understands we are waiting for insurance auth  Objective:     Vitals:   Temp (24hrs), Av 7 °F (37 1 °C), Min:98 6 °F (37 °C), Max:98 9 °F (37 2 °C)    HR:  [83-97] 84  Resp:  [16-18] 18  BP: (111-118)/(56-57) 118/57  SpO2:  [96 %-98 %] 98 %  Body mass index is 23 62 kg/m²  Input and Output Summary (last 24 hours): Intake/Output Summary (Last 24 hours) at 18 1042  Last data filed at 18 9297   Gross per 24 hour   Intake              850 ml   Output                0 ml   Net              850 ml       Physical Exam:     Physical Exam   Constitutional: She is oriented to person, place, and time  No distress  HENT:   Head: Normocephalic  Eyes: Pupils are equal, round, and reactive to light  Neck: Normal range of motion  Cardiovascular: Normal rate, regular rhythm and intact distal pulses  No murmur heard  Pulmonary/Chest: Effort normal and breath sounds normal    Abdominal: Soft  She exhibits no distension  Bowel sounds are increased  There is no tenderness  Musculoskeletal: Normal range of motion  She exhibits no edema  Neurological: She is alert and oriented to person, place, and time  Skin: Skin is warm and dry  Psychiatric: She has a normal mood and affect  Nursing note and vitals reviewed        Additional Data:     Labs:      Results from last 7 days  Lab Units 18  0634   WBC Thousand/uL 6 94   HEMOGLOBIN g/dL 11 9   HEMATOCRIT % 37 7   PLATELETS Thousands/uL 158   NEUTROS PCT % 58   LYMPHS PCT % 29   MONOS PCT % 10   EOS PCT % 2       Results from last 7 days  Lab Units 08/19/18  0634   SODIUM mmol/L 137   POTASSIUM mmol/L 3 8   CHLORIDE mmol/L 106   CO2 mmol/L 28   BUN mg/dL 20   CREATININE mg/dL 0 27*   CALCIUM mg/dL 8 4   GLUCOSE RANDOM mg/dL 104       Results from last 7 days  Lab Units 08/16/18  1434   INR  0 99         Recent Cultures (last 7 days):           Last 24 Hours Medication List:     Current Facility-Administered Medications:  acetaminophen 975 mg Oral Q8H [facility] Linette Lewis, JUAN CARLOS   ALPRAZolam 0 25 mg Oral BID PRN Linette BIBI Lewis, JUAN CARLOS   docusate sodium 100 mg Oral BID Yanniko Valdivia, PA-MERARI   DULoxetine 90 mg Oral Daily Yan Suarez, PA-MERARI   enoxaparin 40 mg Subcutaneous Daily Yan Valdivia, PA-MERARI   ketorolac 30 mg Intravenous Q8H Linette BIBI Lewis, JUAN CARLOS   lidocaine 2 patch Transdermal Daily Linette Lewis, JUAN CARLOS   methocarbamol 500 mg Oral Q6H PRN Linette Lewis, JUAN CARLOS   ondansetron 4 mg Intravenous Q6H PRN Yan Valdivia, PA-MERARI   oxybutynin 10 mg Oral HS Yan Suarez, EMANUEL   oxyCODONE 5 mg Oral Q4H PRN Linette Lewis, JUAN CARLOS   polyethylene glycol 17 g Oral Daily Linette Lewis, JUAN CARLOS   senna 1 tablet Oral HS JUAN CARLOS [place]        Today, Patient Was Seen By: JUAN CARLOS Yap    ** Please Note: Dragon 360 Dictation voice to text software may have been used in the creation of this document   **

## 2018-08-19 NOTE — ASSESSMENT & PLAN NOTE
· POA, noted on x-rays, do not appear to be displaced  Fall seemed to have been mechanical in nature  Pain 3/10 at this time  Uses a 2 canes or a walker at baseline to ambulate CT of right hip negative for fx  · Ortho following,No surgical intervention needed for pubic rami fx, outpatient f/u  Anticipate gradual resolution of pain over the next 3 months with conservative measures/symptomatic treatment  · WBAT BLLE  PT recommending inpatient rehab, patient and  agreeable  CM aware, referrals and auth pending  · DVT prophylaxis  · Patient reporting better pain control today, continue with current pain regimen:  · Tylenol 975 mg PO Q8 scheduled  · Oxycodone 5 PO Q4 PRN moderate pain   · Robaxin 500 mg Q6 hours  · Lidocaine patches x 2 to lower back  · Toradol 30 mg Q8 hours x 4 doses, last dose this afternoon     · Bowel regimen: Colace, Miralax, Senna HS

## 2018-08-19 NOTE — PLAN OF CARE
Problem: Potential for Falls  Goal: Patient will remain free of falls  INTERVENTIONS:  - Assess patient frequently for physical needs  -  Identify cognitive and physical deficits and behaviors that affect risk of falls    -  Leslie fall precautions as indicated by assessment   - Educate patient/family on patient safety including physical limitations  - Instruct patient to call for assistance with activity based on assessment  - Modify environment to reduce risk of injury  - Consider OT/PT consult to assist with strengthening/mobility   Outcome: Progressing      Problem: PAIN - ADULT  Goal: Verbalizes/displays adequate comfort level or baseline comfort level  Interventions:  - Encourage patient to monitor pain and request assistance  - Assess pain using appropriate pain scale  - Administer analgesics based on type and severity of pain and evaluate response  - Implement non-pharmacological measures as appropriate and evaluate response  - Consider cultural and social influences on pain and pain management  - Notify physician/advanced practitioner if interventions unsuccessful or patient reports new pain   Outcome: Progressing      Problem: SAFETY ADULT  Goal: Maintain or return to baseline ADL function  INTERVENTIONS:  -  Assess patient's ability to carry out ADLs; assess patient's baseline for ADL function and identify physical deficits which impact ability to perform ADLs (bathing, care of mouth/teeth, toileting, grooming, dressing, etc )  - Assess/evaluate cause of self-care deficits   - Assess range of motion  - Assess patient's mobility; develop plan if impaired  - Assess patient's need for assistive devices and provide as appropriate  - Encourage maximum independence but intervene and supervise when necessary  ¯ Involve family in performance of ADLs  ¯ Assess for home care needs following discharge   ¯ Request OT consult to assist with ADL evaluation and planning for discharge  ¯ Provide patient education as appropriate   Outcome: Progressing    Goal: Maintain or return mobility status to optimal level  INTERVENTIONS:  - Assess patient's baseline mobility status (ambulation, transfers, stairs, etc )    - Identify cognitive and physical deficits and behaviors that affect mobility  - Identify mobility aids required to assist with transfers and/or ambulation (gait belt, sit-to-stand, lift, walker, cane, etc )  - [place] fall precautions as indicated by assessment  - Record patient progress and toleration of activity level on Mobility SBAR; progress patient to next Phase/Stage  - Instruct patient to call for assistance with activity based on assessment  - Request Rehabilitation consult to assist with strengthening/weightbearing, etc    Outcome: Progressing      Problem: DISCHARGE PLANNING  Goal: Discharge to home or other facility with appropriate resources  INTERVENTIONS:  - Identify barriers to discharge w/patient and caregiver  - Arrange for needed discharge resources and transportation as appropriate  - Identify discharge learning needs (meds, wound care, etc )  - Arrange for interpretive services to assist at discharge as needed  - Refer to Case Management Department for coordinating discharge planning if the patient needs post-hospital services based on physician/advanced practitioner order or complex needs related to functional status, cognitive ability, or social support system   Outcome: Progressing      Problem: DISCHARGE PLANNING - CARE MANAGEMENT  Goal: Discharge to post-acute care or home with appropriate resources  INTERVENTIONS:  - Conduct assessment to determine patient/family and health care team treatment goals, and need for post-acute services based on payer coverage, community resources, and patient preferences, and barriers to discharge  - Address psychosocial, clinical, and financial barriers to discharge as identified in assessment in conjunction with the patient/family and health care team  - Arrange appropriate level of post-acute services according to patient's   needs and preference and payer coverage in collaboration with the physician and health care team  - Communicate with and update the patient/family, physician, and health care team regarding progress on the discharge plan  - Arrange appropriate transportation to post-acute venues   Outcome: Progressing      Problem: Prexisting or High Potential for Compromised Skin Integrity  Goal: Skin integrity is maintained or improved  INTERVENTIONS:  - Identify patients at risk for skin breakdown  - Assess and monitor skin integrity  - Assess and monitor nutrition and hydration status  - Monitor labs (i e  albumin)  - Assess for incontinence   - Turn and reposition patient  - Assist with mobility/ambulation  - Relieve pressure over bony prominences  - Avoid friction and shearing  - Provide appropriate hygiene as needed including keeping skin clean and dry  - Evaluate need for skin moisturizer/barrier cream  - Collaborate with interdisciplinary team (i e  Nutrition, Rehabilitation, etc )   - Patient/family teaching   Outcome: Progressing

## 2018-08-20 PROCEDURE — 97530 THERAPEUTIC ACTIVITIES: CPT

## 2018-08-20 PROCEDURE — 97116 GAIT TRAINING THERAPY: CPT

## 2018-08-20 PROCEDURE — 99232 SBSQ HOSP IP/OBS MODERATE 35: CPT | Performed by: INTERNAL MEDICINE

## 2018-08-20 PROCEDURE — 97535 SELF CARE MNGMENT TRAINING: CPT

## 2018-08-20 RX ORDER — HYDROMORPHONE HYDROCHLORIDE 2 MG/1
4 TABLET ORAL EVERY 4 HOURS PRN
Status: DISCONTINUED | OUTPATIENT
Start: 2018-08-20 | End: 2018-08-21 | Stop reason: HOSPADM

## 2018-08-20 RX ORDER — OXYCODONE HYDROCHLORIDE 10 MG/1
10 TABLET ORAL EVERY 4 HOURS PRN
Status: DISCONTINUED | OUTPATIENT
Start: 2018-08-20 | End: 2018-08-20

## 2018-08-20 RX ORDER — HYDROMORPHONE HYDROCHLORIDE 2 MG/1
2 TABLET ORAL EVERY 4 HOURS PRN
Status: DISCONTINUED | OUTPATIENT
Start: 2018-08-20 | End: 2018-08-21 | Stop reason: HOSPADM

## 2018-08-20 RX ORDER — BISACODYL 10 MG
10 SUPPOSITORY, RECTAL RECTAL DAILY
Status: DISCONTINUED | OUTPATIENT
Start: 2018-08-20 | End: 2018-08-21 | Stop reason: HOSPADM

## 2018-08-20 RX ADMIN — DOCUSATE SODIUM 100 MG: 100 CAPSULE, LIQUID FILLED ORAL at 09:22

## 2018-08-20 RX ADMIN — HYDROMORPHONE HYDROCHLORIDE 4 MG: 2 TABLET ORAL at 14:26

## 2018-08-20 RX ADMIN — SENNOSIDES 8.6 MG: 8.6 TABLET, FILM COATED ORAL at 21:10

## 2018-08-20 RX ADMIN — METHOCARBAMOL 500 MG: 500 TABLET ORAL at 21:10

## 2018-08-20 RX ADMIN — OXYCODONE HYDROCHLORIDE 5 MG: 5 TABLET ORAL at 06:02

## 2018-08-20 RX ADMIN — BISACODYL 10 MG: 10 SUPPOSITORY RECTAL at 09:22

## 2018-08-20 RX ADMIN — ALPRAZOLAM 0.25 MG: 0.25 TABLET ORAL at 00:43

## 2018-08-20 RX ADMIN — ACETAMINOPHEN 975 MG: 325 TABLET, FILM COATED ORAL at 13:32

## 2018-08-20 RX ADMIN — ACETAMINOPHEN 975 MG: 325 TABLET, FILM COATED ORAL at 21:10

## 2018-08-20 RX ADMIN — OXYBUTYNIN CHLORIDE 10 MG: 5 TABLET, EXTENDED RELEASE ORAL at 21:10

## 2018-08-20 RX ADMIN — OXYCODONE HYDROCHLORIDE 10 MG: 10 TABLET ORAL at 12:06

## 2018-08-20 RX ADMIN — HYDROMORPHONE HYDROCHLORIDE 4 MG: 2 TABLET ORAL at 23:00

## 2018-08-20 RX ADMIN — HYDROMORPHONE HYDROCHLORIDE 4 MG: 2 TABLET ORAL at 18:54

## 2018-08-20 RX ADMIN — ENOXAPARIN SODIUM 40 MG: 40 INJECTION SUBCUTANEOUS at 09:22

## 2018-08-20 RX ADMIN — LIDOCAINE 2 PATCH: 50 PATCH CUTANEOUS at 09:22

## 2018-08-20 RX ADMIN — ALPRAZOLAM 0.25 MG: 0.25 TABLET ORAL at 09:43

## 2018-08-20 RX ADMIN — ACETAMINOPHEN 975 MG: 325 TABLET, FILM COATED ORAL at 06:02

## 2018-08-20 RX ADMIN — DULOXETINE HYDROCHLORIDE 90 MG: 60 CAPSULE, DELAYED RELEASE ORAL at 09:22

## 2018-08-20 RX ADMIN — POLYETHYLENE GLYCOL 3350 17 G: 17 POWDER, FOR SOLUTION ORAL at 09:22

## 2018-08-20 RX ADMIN — DOCUSATE SODIUM 100 MG: 100 CAPSULE, LIQUID FILLED ORAL at 17:08

## 2018-08-20 RX ADMIN — OXYCODONE HYDROCHLORIDE 5 MG: 5 TABLET ORAL at 10:34

## 2018-08-20 NOTE — PLAN OF CARE
Problem: DISCHARGE PLANNING - CARE MANAGEMENT  Goal: Discharge to post-acute care or home with appropriate resources  INTERVENTIONS:  - Conduct assessment to determine patient/family and health care team treatment goals, and need for post-acute services based on payer coverage, community resources, and patient preferences, and barriers to discharge  - Address psychosocial, clinical, and financial barriers to discharge as identified in assessment in conjunction with the patient/family and health care team  - Arrange appropriate level of post-acute services according to patient's   needs and preference and payer coverage in collaboration with the physician and health care team  - Communicate with and update the patient/family, physician, and health care team regarding progress on the discharge plan  - Arrange appropriate transportation to post-acute venues   Outcome: Progressing  CM  Met with Pt at bedside to make her aware that the referrals placed are unable to accept her or don't accept her insurance  CM looked online to see which SNF's are in network with Pt's insurance  CM provided Pt with list of in network facilities  Pt would like to discuss them with her daughter and will get back to CM

## 2018-08-20 NOTE — SOCIAL WORK
CM met with Pt for additional choices for STR, Pt would like to go to [facility] but states she is not going until her pain is controlled  Pt states she wants to stay in the hospital until she is able to get up independently and use her walker  CM explained that is why the recommendation is STR and Patients cannot stay in the hospital once they are medically cleared  CM sent referral to [facility]  CM dept will follow Pt until discharge

## 2018-08-20 NOTE — SOCIAL WORK
TIMOTHY  Met with Pt at bedside to make her aware that the referrals placed are unable to accept her or don't accept her insurance  TIMOTHY looked online to see which SNF's are in network with Pt's insurance  TIMOTHY provided Pt with list of in network facilities  Pt would like to discuss them with her daughter and will get back to TIMOTHY

## 2018-08-20 NOTE — PROGRESS NOTES
Progress Note - Anand Wagoner 1955, 61 y o  female MRN: [id number]    Unit/Bed#: -01 Encounter: [id number]    Primary Care Provider: Soo Blackman MD   Date and time admitted to hospital: 8/16/2018 12:11 PM    * Closed bilateral fracture of pubic rami [facility])   Assessment & Plan    · POA, noted on x-rays, do not appear to be displaced  Fall seemed to have been mechanical in nature  Uses a 2 canes or a walker at baseline to ambulate CT of right hip negative for fx  · Ortho following,No surgical intervention needed for pubic rami fx, outpatient f/u ~6 weeks  · Anticipate gradual resolution of pain over the next 3 months with conservative measures/symptomatic treatment  · WBAT BLLE  PT recommending inpatient rehab  CM aware, referrals and auth pending  · Continue DVT prophylaxis  · Patient reporting better pain control today, continue with current pain regimen:  · Tylenol 975 mg PO Q8 scheduled  · Oxycodone 5 PO Q4 PRN moderate pain   · Robaxin 500 mg Q6 hours  · Lidocaine patches x 2 to lower back  · Bowel regimen: Colace, Miralax, Senna HS  Add suppository         Urinary incontinence   Assessment & Plan    · Chronic   · Continue Oxybutynin         Anxiety and depression   Assessment & Plan    · Stable at this time   · Continue Cymbalta with Xanax BID PRN           VTE Pharmacologic Prophylaxis:   Pharmacologic: Enoxaparin (Lovenox)  Mechanical VTE Prophylaxis in Place: Yes    Patient Centered Rounds: I have performed bedside rounds with nursing staff today  Discussions with Specialists or Other Care Team Provider: Case management  Education and Discussions with Family / Patient: patient  Time Spent for Care: 30 minutes  More than 50% of total time spent on counseling and coordination of care as described above      Current Length of Stay: 4 day(s)    Current Patient Status: Inpatient   Certification Statement: The patient will continue to require additional inpatient hospital stay due to patient medically stable  awaiting insurance authorization for rehab     Discharge Plan: patient medically stable  Awaiting insurance auth for rehab  Code Status: Level 1 - Full Code      Subjective:   Pt reports doing okay  She did not sleep well and is c/o constipation  Pain relatively well controlled today  Aware we are awaiting rehab  Objective:     Vitals:   Temp (24hrs), Av 3 °F (36 8 °C), Min:98 1 °F (36 7 °C), Max:98 6 °F (37 °C)    HR:  [79-94] 82  Resp:  [17-18] 18  BP: (121-127)/(52-63) 124/59  SpO2:  [96 %-98 %] 96 %  Body mass index is 23 62 kg/m²  Input and Output Summary (last 24 hours): Intake/Output Summary (Last 24 hours) at 18 0838  Last data filed at 18 0801   Gross per 24 hour   Intake             1640 ml   Output                0 ml   Net             1640 ml       Physical Exam:     Physical Exam   Constitutional: She is oriented to person, place, and time  No distress  Cardiovascular: Normal rate and regular rhythm  Pulmonary/Chest: Effort normal and breath sounds normal  No respiratory distress  Abdominal: Soft  Bowel sounds are normal  She exhibits no distension  There is no tenderness  Musculoskeletal: She exhibits no edema  Neurological: She is alert and oriented to person, place, and time  Skin: Skin is warm and dry  She is not diaphoretic  Psychiatric: She has a normal mood and affect  Nursing note and vitals reviewed        Additional Data:     Labs:      Results from last 7 days  Lab Units 18  0634   WBC Thousand/uL 6 94   HEMOGLOBIN g/dL 11 9   HEMATOCRIT % 37 7   PLATELETS Thousands/uL 158   NEUTROS PCT % 58   LYMPHS PCT % 29   MONOS PCT % 10   EOS PCT % 2       Results from last 7 days  Lab Units 18  0634   SODIUM mmol/L 137   POTASSIUM mmol/L 3 8   CHLORIDE mmol/L 106   CO2 mmol/L 28   BUN mg/dL 20   CREATININE mg/dL 0 27*   CALCIUM mg/dL 8 4   GLUCOSE RANDOM mg/dL 104       Results from last 7 days  Lab Units 08/16/18  1434   INR  0 99       * I Have Reviewed All Lab Data Listed Above  * Additional Pertinent Lab Tests Reviewed: All Labs Within Last 24 Hours Reviewed    Imaging:    Imaging Reports Reviewed Today Include: all  Imaging Personally Reviewed by Myself Includes:  none    Recent Cultures (last 7 days):           Last 24 Hours Medication List:     Current Facility-Administered Medications:  acetaminophen 975 mg Oral Q8H [facility] Linette Lewis, JUAN CARLOS   ALPRAZolam 0 25 mg Oral BID PRN Linette Lewis, JUAN CARLOS   bisacodyl 10 mg Rectal Daily Alyshacharissa Saenz, PA-MERARI   docusate sodium 100 mg Oral BID Yan Savilla Pro, PA-C   DULoxetine 90 mg Oral Daily Yan P Erick, PA-MERARI   enoxaparin 40 mg Subcutaneous Daily Yan Savilla Pro, PA-MERARI   lidocaine 2 patch Transdermal Daily JUAN CARLOS Rivera   methocarbamol 500 mg Oral Q6H PRN Linette Lewis, NAOMYNP   ondansetron 4 mg Intravenous Q6H PRN Yan Savilla Pro, PA-MERARI   oxybutynin 10 mg Oral HS Yan P Erick, EMANUEL   oxyCODONE 5 mg Oral Q4H PRN Linette Lewis, CRNP   polyethylene glycol 17 g Oral Daily Linette Lewis, JUAN CARLOS   senna 1 tablet Oral HS JUAN CARLOS [place]        Today, Patient Was Seen By: Sabine Castillo PA-C    ** Please Note: Dictation voice to text software may have been used in the creation of this document   **

## 2018-08-20 NOTE — PHYSICAL THERAPY NOTE
Physical Thera[y Tx Session:       08/20/18 1130   Pain Assessment   Pain Assessment 0-10   Pain Score Worst Possible Pain   Pain Type Acute pain  (per NSG,pain meds given about 40 minutes ago)   Pain Location Hip;Leg;Pelvis   Pain Orientation Bilateral   Hospital Pain Intervention(s) Repositioned; Ambulation/increased activity; Elevated; Emotional support; Environmental changes; Rest   Restrictions/Precautions   Weight Bearing Precautions Per Order Yes   RLE Weight Bearing Per Order WBAT   LLE Weight Bearing Per Order WBAT   Other Precautions WBS;Pain; Fall Risk;Multiple lines   General   Chart Reviewed Yes   Family/Caregiver Present No   Cognition   Overall Cognitive Status WFL   Arousal/Participation Cooperative; Alert   Attention Attends with cues to redirect   Orientation Level Oriented to person;Oriented to place;Oriented to situation;Disoriented to time   Following Commands Follows one step commands with increased time or repetition  (2* inc pain,slow mobility)   Subjective   Subjective pt supine in bed resting comfortably;just finished use of bedpan with NSG present;pt willing and agreeable to work with PT and to participate in therapy intervention;"I want to get better and move like I used too"   Bed Mobility   Supine to Sit 4  Minimal assistance   Additional items Assist x 1;HOB elevated; Bedrails; Increased time required;Verbal cues;LE management  (slow mobility throughout all modes of mobility)   Transfers   Sit to Stand 4  Minimal assistance   Additional items Assist x 1;Bedrails; Increased time required;Verbal cues  (inc time 2* inc pain and discomfort)   Stand to Sit 3  Moderate assistance   Additional items Assist x 1; Armrests; Increased time required;Verbal cues  (for safety,education and control descent)   Toilet transfer 3  Moderate assistance   Additional items Assist x 1; Armrests; Increased time required;Standard toilet;Commode   Additional Comments min verbal and physical instructions for B feet proper positioning and during turns   Ambulation/Elevation   Gait pattern Improper Weight shift; Antalgic;Narrow GAURAV; Forward Flexion;Decreased R stance;Decreased L stance; Inconsistent dmitriy; Foward flexed; Short stride; Ataxia; Step to;Excessively slow   Gait Assistance 4  Minimal assist   Additional items Assist x 1;Verbal cues; Tactile cues   Assistive Device Rolling walker  (personal RW with rotating front wheels)   Distance 50 feet x2 with use of personal DME on tile and hardwood marci;very slow mobility and gait pattern with inc pain during each step per pt;additional 10 feet BR->recliner chair post use of BR to void   Balance   Static Sitting Good  (in chair postmobility with BLE elevated)   Dynamic Sitting Poor   Static Standing Poor +   Dynamic Standing Poor +   Ambulatory Poor +   Endurance Deficit   Endurance Deficit Yes   Endurance Deficit Description pain,weakness,fatigue   Activity Tolerance   Activity Tolerance Patient limited by fatigue;Patient limited by pain  (fair)   Medical Staff Made Aware CM   Nurse Made Aware yes   Exercises   Quad Sets Sitting;5 reps;AROM; Bilateral  (hold for 3 seconds each rep)   Glute Sets Sitting;5 reps;AROM; Bilateral  (hold for 3 seconds each rep)   Ankle Pumps Sitting;20 reps;AROM; Bilateral   Assessment   Prognosis Good   Problem List Decreased strength;Decreased range of motion;Decreased endurance; Impaired balance;Decreased mobility; Decreased skin integrity;Pain;Orthopedic restrictions;Decreased cognition   Assessment Pt able to ambulate 50 feet x2 with use of personal DME on various surfaces minAx1 for proper B feet positioning and during turns  Pt cont to report inc pain and discomfort during all modes of mobility especially BM and B WB LE  Pt able to perform sit to stand transfers modAx1 from various surfaces  Pt needs minAx1 for BM  Pt able to demonstrate following min verbal and physical instruction of BLE QS,GS and AP   Pt would cont to benefit from skilled inpt PT services to maximize functional independence  Pt reports following mobility pain level dec to 8/10, "I just feel stiff"   Barriers to Discharge Decreased caregiver support; Inaccessible home environment   Goals   Patient Goals to walk better and to dec the pain   STG Expiration Date 08/30/18   Short Term Goal #1 in 7-10 days: Primary STG from initial PT eval remain the same except pt to ambulate >150 feet with use of personal DME on various surfaces minAx1->S to inc mobility and return to PLOF,pt will be able to complete and perform BLE ther ex HEP in various positions to inc balance,strength, endurance and mobility   Treatment Day 3   Plan   Treatment/Interventions Functional transfer training;LE strengthening/ROM; Therapeutic exercise; Endurance training;Elevations; Patient/family training;Equipment eval/education; Bed mobility;Gait training;Spoke to nursing;Spoke to case management;OT   Progress Slow progress, decreased activity tolerance   PT Frequency 5x/wk; Weekend   Recommendation   Recommendation Post acute IP rehab   Equipment Recommended Walker  (personal RW)   Skilled PT recommended while in hospital and upon DC to progress pt toward treatment goals

## 2018-08-20 NOTE — PLAN OF CARE
Problem: PHYSICAL THERAPY ADULT  Goal: Performs mobility at highest level of function for planned discharge setting  See evaluation for individualized goals  Treatment/Interventions: Functional transfer training, LE strengthening/ROM, Elevations, Therapeutic exercise, Endurance training, Cognitive reorientation, Patient/family training, Gait training, Bed mobility, Equipment eval/education, Spoke to nursing  Equipment Recommended: Wheelchair       See flowsheet documentation for full assessment, interventions and recommendations  Outcome: Progressing  Prognosis: Good  Problem List: Decreased strength, Decreased range of motion, Decreased endurance, Impaired balance, Decreased mobility, Decreased skin integrity, Pain, Orthopedic restrictions, Decreased cognition  Assessment: Pt able to ambulate 50 feet x2 with use of personal DME on various surfaces minAx1 for proper B feet positioning and during turns  Pt cont to report inc pain and discomfort during all modes of mobility especially BM and B WB LE  Pt able to perform sit to stand transfers modAx1 from various surfaces  Pt needs minAx1 for BM  Pt able to demonstrate following min verbal and physical instruction of BLE QS,GS and AP  Pt would cont to benefit from skilled inpt PT services to maximize functional independence  Pt reports following mobility pain level dec to 8/10, "I just feel stiff"  Barriers to Discharge: Decreased caregiver support, Inaccessible home environment     Recommendation: Post acute IP rehab          See flowsheet documentation for full assessment

## 2018-08-20 NOTE — PLAN OF CARE
Problem: OCCUPATIONAL THERAPY ADULT  Goal: Performs self-care activities at highest level of function for planned discharge setting  See evaluation for individualized goals  Treatment Interventions: ADL retraining, Functional transfer training, Patient/family training, Equipment evaluation/education, Compensatory technique education, Continued evaluation, Activityengagement  Equipment Recommended: Other (comment) (drop arm commode)       See flowsheet documentation for full assessment, interventions and recommendations  Outcome: Progressing  Limitation: Decreased ADL status, Decreased endurance, Decreased Safe judgement during ADL, Decreased self-care trans, Decreased high-level ADLs     Assessment: Pt seen for OT tx session this AM  Pt benefits from extensive time and cues to challenge activity tolerance during ADL performance  Pt completed bathing, dressing, and toileting  Pt reported fatigue, but able to participate in OT session   Continue to recommend post acute rehab when medically stable for discharge from acute care to return to baseline level of I      OT Discharge Recommendation: Other (Comment) (post acute rehab)  OT - OK to Discharge:  (when medically stable)

## 2018-08-20 NOTE — PLAN OF CARE
Problem: Potential for Falls  Goal: Patient will remain free of falls  INTERVENTIONS:  - Assess patient frequently for physical needs  -  Identify cognitive and physical deficits and behaviors that affect risk of falls    -  [place] fall precautions as indicated by assessment   - Educate patient/family on patient safety including physical limitations  - Instruct patient to call for assistance with activity based on assessment  - Modify environment to reduce risk of injury  - Consider OT/PT consult to assist with strengthening/mobility   Outcome: Progressing      Problem: PAIN - ADULT  Goal: Verbalizes/displays adequate comfort level or baseline comfort level  Interventions:  - Encourage patient to monitor pain and request assistance  - Assess pain using appropriate pain scale  - Administer analgesics based on type and severity of pain and evaluate response  - Implement non-pharmacological measures as appropriate and evaluate response  - Consider cultural and social influences on pain and pain management  - Notify physician/advanced practitioner if interventions unsuccessful or patient reports new pain   Outcome: Progressing      Problem: SAFETY ADULT  Goal: Maintain or return to baseline ADL function  INTERVENTIONS:  -  Assess patient's ability to carry out ADLs; assess patient's baseline for ADL function and identify physical deficits which impact ability to perform ADLs (bathing, care of mouth/teeth, toileting, grooming, dressing, etc )  - Assess/evaluate cause of self-care deficits   - Assess range of motion  - Assess patient's mobility; develop plan if impaired  - Assess patient's need for assistive devices and provide as appropriate  - Encourage maximum independence but intervene and supervise when necessary  ¯ Involve family in performance of ADLs  ¯ Assess for home care needs following discharge   ¯ Request OT consult to assist with ADL evaluation and planning for discharge  ¯ Provide patient education as appropriate   Outcome: Progressing    Goal: Maintain or return mobility status to optimal level  INTERVENTIONS:  - Assess patient's baseline mobility status (ambulation, transfers, stairs, etc )    - Identify cognitive and physical deficits and behaviors that affect mobility  - Identify mobility aids required to assist with transfers and/or ambulation (gait belt, sit-to-stand, lift, walker, cane, etc )  - [place] fall precautions as indicated by assessment  - Record patient progress and toleration of activity level on Mobility SBAR; progress patient to next Phase/Stage  - Instruct patient to call for assistance with activity based on assessment  - Request Rehabilitation consult to assist with strengthening/weightbearing, etc    Outcome: Progressing      Problem: DISCHARGE PLANNING  Goal: Discharge to home or other facility with appropriate resources  INTERVENTIONS:  - Identify barriers to discharge w/patient and caregiver  - Arrange for needed discharge resources and transportation as appropriate  - Identify discharge learning needs (meds, wound care, etc )  - Arrange for interpretive services to assist at discharge as needed  - Refer to Case Management Department for coordinating discharge planning if the patient needs post-hospital services based on physician/advanced practitioner order or complex needs related to functional status, cognitive ability, or social support system   Outcome: Progressing      Problem: DISCHARGE PLANNING - CARE MANAGEMENT  Goal: Discharge to post-acute care or home with appropriate resources  INTERVENTIONS:  - Conduct assessment to determine patient/family and health care team treatment goals, and need for post-acute services based on payer coverage, community resources, and patient preferences, and barriers to discharge  - Address psychosocial, clinical, and financial barriers to discharge as identified in assessment in conjunction with the patient/family and health care team  - Arrange appropriate level of post-acute services according to patient's   needs and preference and payer coverage in collaboration with the physician and health care team  - Communicate with and update the patient/family, physician, and health care team regarding progress on the discharge plan  - Arrange appropriate transportation to post-acute venues   Outcome: Progressing      Problem: Prexisting or High Potential for Compromised Skin Integrity  Goal: Skin integrity is maintained or improved  INTERVENTIONS:  - Identify patients at risk for skin breakdown  - Assess and monitor skin integrity  - Assess and monitor nutrition and hydration status  - Monitor labs (i e  albumin)  - Assess for incontinence   - Turn and reposition patient  - Assist with mobility/ambulation  - Relieve pressure over bony prominences  - Avoid friction and shearing  - Provide appropriate hygiene as needed including keeping skin clean and dry  - Evaluate need for skin moisturizer/barrier cream  - Collaborate with interdisciplinary team (i e  Nutrition, Rehabilitation, etc )   - Patient/family teaching   Outcome: Progressing

## 2018-08-20 NOTE — ASSESSMENT & PLAN NOTE
· POA, noted on x-rays, do not appear to be displaced  Fall seemed to have been mechanical in nature  Uses a 2 canes or a walker at baseline to ambulate CT of right hip negative for fx  · Ortho following,No surgical intervention needed for pubic rami fx, outpatient f/u ~6 weeks  · Anticipate gradual resolution of pain over the next 3 months with conservative measures/symptomatic treatment  · WBAT BLLE  PT recommending inpatient rehab  CM aware, referrals and auth pending  · Continue DVT prophylaxis  · Patient reporting better pain control today, continue with current pain regimen:  · Tylenol 975 mg PO Q8 scheduled  · Oxycodone 5 PO Q4 PRN moderate pain   · Robaxin 500 mg Q6 hours  · Lidocaine patches x 2 to lower back  · Bowel regimen: Colace, Miralax, Senna HS   Add suppository

## 2018-08-20 NOTE — OCCUPATIONAL THERAPY NOTE
[address]  Progress Note     Patient Name: Levon Felty EGZZL'B Date: 8/20/2018  Problem List  Patient Active Problem List   Diagnosis    Closed bilateral fracture of pubic rami ([address] )    Anxiety and depression    Urinary incontinence         08/20/18 1037   Restrictions/Precautions   RLE Weight Bearing Per Order WBAT   LLE Weight Bearing Per Order WBAT   Other Precautions Fall Risk;Pain;Bed Alarm;WBS   General   Response to Previous Treatment Patient reporting fatigue but able to participate   Lifestyle   Intrinsic Gratification Pt reports enjoying her grand daughter Valentina Wen  Pt added that she goes out to [place] to see her every few months   Pain Assessment   Pain Assessment 0-10   Pain Score 3  (upon therapist arrival resting in bed)   Pain Location Pelvis   Effect of Pain on Daily Activities limits standing tolerance and activity tolerance   Patient's Stated Pain Goal No pain   Hospital Pain Intervention(s) Repositioned; Ambulation/increased activity; Emotional support   Response to Interventions tolerated, resting comfortably in bed w/ needs met   ADL   Eating Assistance 6  Modified independent   Eating Deficit Setup   Eating Comments seated on commode after set - up at tray table   Grooming Assistance 6  Modified Independent   Grooming Deficit Setup   Grooming Comments seated at EOB after set- up at tray table to complete oral hygiene    [facility] [address]; Increased time to complete;Verbal cueing   LB Bathing Assistance 2  Maximal Assistance   LB Bathing Deficit Perineal area; Buttocks; Setup; Increased time to complete   LB Bathing Comments standing using RW   UB Dressing Assistance 6  Modified independent   UB Dressing Deficit Setup; Increased time to complete   LB Dressing Assistance 2  Maximal Assistance   LB Dressing Comments pt reports using AE to complete at baseline   Recommend trial using AE next session   [address]  3 Moderate Assistance   Toileting Deficit Setup; Bedside commode;Clothing management up;Clothing management down;Perineal hygiene; Increased time to complete   Bed Mobility   Supine to Sit 4  Minimal assistance   Additional items Assist x 1;HOB elevated; Increased time required;Verbal cues;LE management   Sit to Supine 3  Moderate assistance   Additional items Assist x 1;HOB elevated; Increased time required;Verbal cues;LE management   Additional Comments Pt required max A x 2 using pad to adjust position towards [facility]   Transfers   Sit to Stand 4  Minimal assistance   Additional items Assist x 1; Armrests; Increased time required;Verbal cues   Stand to Sit 4  Minimal assistance   Additional items Assist x 1; Increased time required;Verbal cues;Armrests   Toilet transfer 4  Minimal assistance   Additional items Assist x 1; Increased time required;Commode;Armrests; Verbal cues   Additional Comments Pt required extensive time to complete functional transfers bed <> commode using RW  Pt declined functional mobiltiy to bathroom to complete grooming/ toileting   Functional Mobility   Functional Mobility 4  Minimal assistance   Additional Comments few feet bed <> commode   Additional items Rolling walker   Toilet Transfers   Toilet Transfer From Rolling walker;Bed   Toilet Transfer Type To and from   Toilet Transfer to Standard bedside commode   Toilet Transfer Technique Ambulating   Toilet Transfers Minimal assistance;Verbal cues   Toilet Transfers Comments extensive time to complete   Coordination   Fine Motor able to manage food containers and toothpaste   Cognition   Overall Cognitive Status WFL  (anxious, frustrated, tearful intially)   Arousal/Participation Alert; Cooperative;Lethargic   Attention Within functional limits   Orientation Level Oriented X4   Memory Within functional limits   Following Commands Follows multistep commands with increased time or repetition   Comments Identified pt by full name and wrist band   Pt able to participate in covnersation  Flat affect, and tearful at times  Pt stated that she just wants to go home  Activity Tolerance   Activity Tolerance Patient limited by pain; Patient limited by fatigue   Medical Staff Made Aware spoke to Sophia AGUIRRE and RNLizette   Assessment   Assessment Pt seen for OT tx session this AM  Pt benefits from extensive time and cues to challenge activity tolerance during ADL performance  Pt completed bathing, dressing, and toileting  Pt reported fatigue, but able to participate in OT session  Continue to recommend post acute rehab when medically stable for discharge from acute care to return to baseline level of I  Plan   Treatment Interventions ADL retraining;Functional transfer training;Patient/family training;Equipment evaluation/education; Compensatory technique education; Activityengagement;Continued evaluation   Goal Expiration Date 08/24/18   Treatment Day 1   OT Frequency 3-5x/wk   Recommendation   OT Discharge Recommendation Other (Comment)  (post acute rehab)   Equipment Recommended (has DME at home, will continue to assess at rehab)   OT - OK to Discharge (when medically stable)   Barthel Index   Feeding 10   Bathing 0   Grooming Score 5   Dressing Score 5   Bladder Score 10   Bowels Score 10   Toilet Use Score 5   Transfers (Bed/Chair) Score 10   Mobility (Level Surface) Score 0   Stairs Score 5   Barthel Index Score 60   Modified [place] Scale   Modified [place] Scale 4   Ynes Turk OT     Additional revised goals to be met in 7 days:  1  Pt will complete functional transfers using AD w/ mod I to bed, chair, and toilet  2  Pt will demonstrate increased functional standing tolerance for at least 10 minutes during ADL performance to max I w/ functional mobility to return home to [place]  3   Pt will complete UBD/ LBD w/ mod I after set- up using CAROLYN Jung OTR/L

## 2018-08-21 VITALS
WEIGHT: 125 LBS | OXYGEN SATURATION: 94 % | TEMPERATURE: 98.6 F | DIASTOLIC BLOOD PRESSURE: 52 MMHG | BODY MASS INDEX: 23.6 KG/M2 | HEIGHT: 61 IN | HEART RATE: 84 BPM | SYSTOLIC BLOOD PRESSURE: 100 MMHG | RESPIRATION RATE: 18 BRPM

## 2018-08-21 PROCEDURE — 99239 HOSP IP/OBS DSCHRG MGMT >30: CPT | Performed by: INTERNAL MEDICINE

## 2018-08-21 RX ORDER — HYDROMORPHONE HYDROCHLORIDE 2 MG/1
2 TABLET ORAL EVERY 4 HOURS PRN
Qty: 20 TABLET | Refills: 0 | Status: SHIPPED | OUTPATIENT
Start: 2018-08-21 | End: 2018-09-20

## 2018-08-21 RX ORDER — POLYETHYLENE GLYCOL 3350 17 G/17G
17 POWDER, FOR SOLUTION ORAL DAILY
Qty: 14 EACH | Refills: 0 | Status: ON HOLD
Start: 2018-08-22 | End: 2020-08-13 | Stop reason: SDUPTHER

## 2018-08-21 RX ORDER — LIDOCAINE 50 MG/G
1 PATCH TOPICAL DAILY
Status: DISCONTINUED | OUTPATIENT
Start: 2018-08-21 | End: 2018-08-21 | Stop reason: HOSPADM

## 2018-08-21 RX ORDER — ALPRAZOLAM 0.25 MG/1
0.25 TABLET ORAL 2 TIMES DAILY PRN
Qty: 6 TABLET | Refills: 0 | Status: SHIPPED | OUTPATIENT
Start: 2018-08-21 | End: 2020-04-02 | Stop reason: SDUPTHER

## 2018-08-21 RX ORDER — LIDOCAINE 50 MG/G
1 PATCH TOPICAL DAILY
Qty: 3 PATCH | Refills: 0 | Status: SHIPPED | OUTPATIENT
Start: 2018-08-22 | End: 2020-08-13 | Stop reason: HOSPADM

## 2018-08-21 RX ORDER — ACETAMINOPHEN 325 MG/1
975 TABLET ORAL EVERY 8 HOURS
Qty: 30 TABLET | Refills: 0 | Status: SHIPPED | OUTPATIENT
Start: 2018-08-21 | End: 2020-08-13 | Stop reason: HOSPADM

## 2018-08-21 RX ORDER — DOCUSATE SODIUM 100 MG/1
100 CAPSULE, LIQUID FILLED ORAL 2 TIMES DAILY
Qty: 10 CAPSULE | Refills: 0
Start: 2018-08-21 | End: 2020-09-14 | Stop reason: ALTCHOICE

## 2018-08-21 RX ORDER — LIDOCAINE 50 MG/G
1 PATCH TOPICAL DAILY
Status: DISCONTINUED | OUTPATIENT
Start: 2018-08-22 | End: 2018-08-21 | Stop reason: HOSPADM

## 2018-08-21 RX ORDER — HYDROMORPHONE HYDROCHLORIDE 4 MG/1
4 TABLET ORAL EVERY 4 HOURS PRN
Qty: 20 TABLET | Refills: 0 | Status: SHIPPED | OUTPATIENT
Start: 2018-08-21 | End: 2018-08-31

## 2018-08-21 RX ORDER — SENNOSIDES 8.6 MG
1 TABLET ORAL
Qty: 120 EACH | Refills: 0 | Status: ON HOLD
Start: 2018-08-21 | End: 2020-08-13 | Stop reason: SDUPTHER

## 2018-08-21 RX ORDER — METHOCARBAMOL 500 MG/1
500 TABLET, FILM COATED ORAL EVERY 6 HOURS PRN
Refills: 0 | Status: ON HOLD
Start: 2018-08-21 | End: 2020-08-13 | Stop reason: SDUPTHER

## 2018-08-21 RX ADMIN — DULOXETINE HYDROCHLORIDE 90 MG: 60 CAPSULE, DELAYED RELEASE ORAL at 09:31

## 2018-08-21 RX ADMIN — ACETAMINOPHEN 975 MG: 325 TABLET, FILM COATED ORAL at 13:06

## 2018-08-21 RX ADMIN — LIDOCAINE 2 PATCH: 50 PATCH CUTANEOUS at 09:32

## 2018-08-21 RX ADMIN — METHOCARBAMOL 500 MG: 500 TABLET ORAL at 03:11

## 2018-08-21 RX ADMIN — ALPRAZOLAM 0.25 MG: 0.25 TABLET ORAL at 11:40

## 2018-08-21 RX ADMIN — HYDROMORPHONE HYDROCHLORIDE 2 MG: 2 TABLET ORAL at 09:44

## 2018-08-21 RX ADMIN — ENOXAPARIN SODIUM 40 MG: 40 INJECTION SUBCUTANEOUS at 09:30

## 2018-08-21 RX ADMIN — HYDROMORPHONE HYDROCHLORIDE 4 MG: 2 TABLET ORAL at 13:56

## 2018-08-21 RX ADMIN — POLYETHYLENE GLYCOL 3350 17 G: 17 POWDER, FOR SOLUTION ORAL at 09:31

## 2018-08-21 RX ADMIN — DOCUSATE SODIUM 100 MG: 100 CAPSULE, LIQUID FILLED ORAL at 09:31

## 2018-08-21 RX ADMIN — ACETAMINOPHEN 975 MG: 325 TABLET, FILM COATED ORAL at 05:42

## 2018-08-21 RX ADMIN — LIDOCAINE 1 PATCH: 50 PATCH TOPICAL at 11:40

## 2018-08-21 RX ADMIN — HYDROMORPHONE HYDROCHLORIDE 4 MG: 2 TABLET ORAL at 05:42

## 2018-08-21 RX ADMIN — METHOCARBAMOL 500 MG: 500 TABLET ORAL at 13:07

## 2018-08-21 NOTE — SOCIAL WORK
Per [employer] at [facility], they received auth for STR  CM met with Pt at bedside to make her aware  Pt is agreeable to going today  CM discussed transport there, Pt would like [facility] arranged and is agreeable to cost  CM called [facility] and spoke to [place] to arrange [facility], Per Nicolas Howell will  Pt at 4:30pm  CM made Pt, [employer] at [facility], nurse Barak Oswald, and Dr Arun Castaneda aware of  time  Pt states she already told her

## 2018-08-21 NOTE — PLAN OF CARE
Problem: DISCHARGE PLANNING - CARE MANAGEMENT  Goal: Discharge to post-acute care or home with appropriate resources  INTERVENTIONS:  - Conduct assessment to determine patient/family and health care team treatment goals, and need for post-acute services based on payer coverage, community resources, and patient preferences, and barriers to discharge  - Address psychosocial, clinical, and financial barriers to discharge as identified in assessment in conjunction with the patient/family and health care team  - Arrange appropriate level of post-acute services according to patient's   needs and preference and payer coverage in collaboration with the physician and health care team  - Communicate with and update the patient/family, physician, and health care team regarding progress on the discharge plan  - Arrange appropriate transportation to post-acute venues   Outcome: Completed Date Met: 08/21/18  Per Swathi Olivia at [facility], they received auth for STR  CM met with Pt at bedside to make her aware  Pt is agreeable to going today  CM discussed transport there, Pt would like [facility] arranged and is agreeable to cost  CM called [facility] and spoke to [facility] to arrange [facility], Per Tilda Bosworth will  Pt at 4:30pm  CM made Pt, Swathi Olivia at [facility], nurse Daniella Osman, and Dr Joan Del Rio aware of  time  Pt states she already told her

## 2018-08-21 NOTE — DISCHARGE SUMMARY
Discharge Summary - [facility] Internal Medicine    Patient Information: Lou Truong 61 y o  female MRN: [id number]  Unit/Bed#: -01 Encounter: [id number]    Discharging Physician / Practitioner: Yoselin Mcdonald MD  PCP: Abad Neal MD  Admission Date: 8/16/2018  Discharge Date: 08/21/18    Principal discharge diagnosis:  Closed bilateral fracture of pubic rami    Secondary diagnoses:  1  Anxiety and depression  2  Urinary incontinence    Consultations During Hospital Stay:  Orthopedics    Procedures Performed:   1  Xray right hip/pelvis - Fractures of bilateral inferior and superior pubic rami as described  Deformity of the right femoral head which appears to be degenerative  2  CT right hip without contrast - Fractures of the bilateral superior and inferior pubic rami  Severe degenerative change in the right hip  Hospital Course:     Lou Truong is a 61 y o  female patient who originally presented to the hospital on 8/16/2018 with pelvic and right hip pain following a fall  X-ray showed bilateral superior and inferior pubic rami fracture  She was seen in consultation with Orthopedics and was managed conservatively  At the time of discharge her pain was controlled on Dilaudid 2 mg every 4 hours as needed for moderate pain and 4 mg as needed every 4 hours for severe pain, scheduled Acetaminophen and Lidoderm patch  She is being discharged to [facility] for rehab and has been advised to continue Lovenox for 28 days  She should follow up with Orthopedics as an outpatient  Condition at Discharge: stable     Discharge Day Visit / Exam:     * Please refer to separate progress for these details *    Discharge instructions/Information to patient and family:   See after visit summary for information provided to patient and family  Provisions for Follow-Up Care:  See after visit summary for information related to follow-up care and any pertinent home health orders        Disposition: Short-term [address]hab [address] Readmission: No    Discharge Statement:  I spent 40 minutes discharging the patient  This time was spent on the day of discharge  I had direct contact with the patient on the day of discharge  Greater than 50% of the total time was spent examining patient, answering all patient questions, arranging and discussing plan of care with patient as well as directly providing post-discharge instructions  Additional time then spent on discharge activities  Discharge Medications:  See after visit summary for reconciled discharge medications provided to patient and family  ** Please Note: Dragon 360 Dictation voice to text software may have been used in the creation of this document   **

## 2018-08-21 NOTE — ASSESSMENT & PLAN NOTE
· POA, noted on x-rays, do not appear to be displaced  Fall seemed to have been mechanical in nature  Uses 2 canes or a walker at baseline to ambulate  CT of right hip negative for fx  · Ortho following,No surgical intervention needed for pubic rami fx, outpatient f/u ~6 weeks  · Anticipate gradual resolution of pain over the next 3 months with conservative measures/symptomatic treatment  · WBAT BLLE  PT recommending inpatient rehab  Will be discharged to rehab today at 4:30 pm  · Continue DVT prophylaxis x 28 days per ortho  · Pain controlled on current regimen:  · Tylenol 975 mg PO Q8 scheduled  · Dilaudid 2 mg q 4 hrs prn for moderate pain  · Dilaudid 4 mg q 4 hrs prn for severe pain   · Robaxin 500 mg Q6 hours prn  · Lidocaine patches x 2 to right lower back and posterior thigh  · Bowel regimen: Colace, Miralax, Senna HS

## 2018-08-21 NOTE — PROGRESS NOTES
Progress Note - Noé Ramírez 1955, 61 y o  female MRN: [id number]    Unit/Bed#: -01 Encounter: [id number]    Primary Care Provider: Richar Solano MD   Date and time admitted to hospital: 8/16/2018 12:11 PM        * Closed bilateral fracture of pubic rami [facility])   Assessment & Plan    · POA, noted on x-rays, do not appear to be displaced  Fall seemed to have been mechanical in nature  Uses 2 canes or a walker at baseline to ambulate  CT of right hip negative for fx  · Ortho following,No surgical intervention needed for pubic rami fx, outpatient f/u ~6 weeks  · Anticipate gradual resolution of pain over the next 3 months with conservative measures/symptomatic treatment  · WBAT BLLE  PT recommending inpatient rehab  Will be discharged to rehab today at 4:30 pm  · Continue DVT prophylaxis x 28 days per ortho  · Pain controlled on current regimen:  · Tylenol 975 mg PO Q8 scheduled  · Dilaudid 2 mg q 4 hrs prn for moderate pain  · Dilaudid 4 mg q 4 hrs prn for severe pain   · Robaxin 500 mg Q6 hours prn  · Lidocaine patches x 2 to right lower back and posterior thigh  · Bowel regimen: Colace, Miralax, Senna HS  Anxiety and depression   Assessment & Plan    · Stable at this time   · Continue Cymbalta with Xanax BID PRN         Urinary incontinence   Assessment & Plan    · Chronic   · Continue Oxybutynin             VTE Pharmacologic Prophylaxis:   Pharmacologic: Enoxaparin (Lovenox)  Mechanical VTE Prophylaxis in Place: Yes    Patient Centered Rounds: I have performed bedside rounds with nursing staff today  Discussions with Specialists or Other Care Team Provider: Discussed with orthopedics    Education and Discussions with Family / Patient: Mirela Klein family update    Time Spent for Care: 20 minutes  More than 50% of total time spent on counseling and coordination of care as described above      Current Length of Stay: 5 day(s)    Current Patient Status: Inpatient     Discharge Plan: Discharge home today    Code Status: Level 1 - Full Code    Subjective:   Pelvic and right hip pain controlled with change in Oxycodone to Dilaudid  Having bowel movements  Objective:     Vitals:   Temp (24hrs), Av 9 °F (37 2 °C), Min:97 9 °F (36 6 °C), Max:99 5 °F (37 5 °C)    HR:  [73-93] 73  Resp:  [18] 18  BP: (105-126)/(53-60) 105/53  SpO2:  [95 %-99 %] 95 %  Body mass index is 23 62 kg/m²  Physical Exam:     Physical Exam   Constitutional: She is oriented to person, place, and time  HENT:   Head: Atraumatic  Eyes: EOM are normal    Neck: Neck supple  No JVD present  No tracheal deviation present  No thyromegaly present  Cardiovascular: Normal rate, regular rhythm and normal heart sounds  Pulmonary/Chest: Breath sounds normal  No respiratory distress  She has no wheezes  She has no rales  Abdominal: Soft  Bowel sounds are normal  She exhibits no distension  There is no tenderness  There is no rebound  Musculoskeletal: She exhibits no edema  Neurological: She is alert and oriented to person, place, and time  Skin: Skin is warm and dry  Psychiatric: She has a normal mood and affect  Additional Data:     Labs:      Results from last 7 days  Lab Units 18  0634   WBC Thousand/uL 6 94   HEMOGLOBIN g/dL 11 9   HEMATOCRIT % 37 7   PLATELETS Thousands/uL 158   NEUTROS PCT % 58   LYMPHS PCT % 29   MONOS PCT % 10   EOS PCT % 2       Results from last 7 days  Lab Units 18  0634   SODIUM mmol/L 137   POTASSIUM mmol/L 3 8   CHLORIDE mmol/L 106   CO2 mmol/L 28   BUN mg/dL 20   CREATININE mg/dL 0 27*   CALCIUM mg/dL 8 4   GLUCOSE RANDOM mg/dL 104       Results from last 7 days  Lab Units 18  1434   INR  0 99                 * I Have Reviewed All Lab Data Listed Above  * Additional Pertinent Lab Tests Reviewed:  All [place] Admission Reviewed    Last 24 Hours Medication List:     Current Facility-Administered Medications:  acetaminophen 975 mg Oral Count includes the [facility] JUAN CARLOS Whitfield   ALPRAZolam 0 25 mg Oral BID PRN JUAN CARLOS Rivera   bisacodyl 10 mg Rectal Daily Alysha Saenz PA-C   docusate sodium 100 mg Oral BID Yan Garner PA-C   DULoxetine 90 mg Oral Daily Yan Suarez PA-C   enoxaparin 40 mg Subcutaneous Daily Yan Garner PA-C   HYDROmorphone 2 mg Oral Q4H PRN Shiraz Alejandre PA-C   HYDROmorphone 4 mg Oral Q4H PRN Shiraz Alejandre PA-C   [START ON 8/22/2018] lidocaine 1 patch Transdermal Daily Tana Douglas MD   lidocaine 1 patch Transdermal Daily Tana Douglas MD   methocarbamol 500 mg Oral Q6H PRN JUAN CARLOS Whitfield   ondansetron 4 mg Intravenous Q6H PRN Yan Garner PA-C   oxybutynin 10 mg Oral HS Yan Suarez PA-C   polyethylene glycol 17 g Oral Daily JUAN CARLOS Rivera   senna 1 tablet Oral HS JUAN CARLOS Whitfield        Today, Patient Was Seen By: Tana Douglas MD    ** Please Note: Dictation voice to text software may have been used in the creation of this document   **

## 2018-08-22 NOTE — CASE MANAGEMENT
Notification of Discharge  This is a Notification of Discharge from our facility [address]  Please be advised that this patient has been discharge from our facility  Below you will find the admission and discharge date and time including the patients disposition  PRESENTATION DATE: 8/16/2018 12:11 PM  IP ADMISSION DATE: 8/16/18 1503  DISCHARGE DATE: 8/21/2018  5:08 PM  DISPOSITION: Non [facility] SNF/TCU/SNU    [address] [facility] in the [facility] by [facility] Utilization Review Department  Phone: [phone]; Fax [phone]  ATTENTION: The Network Utilization Review Department is now centralized for our 9 Facilities  Make a note that we have a new phone and fax numbers for our Department  Please call with any questions or concerns to [phone] and carefully follow the prompts so that you are directed to the right person  All voicemails are confidential  Fax any determinations, approvals, denials, and requests for initial or continue stay review clinical to [phone]  Due to HIGH CALL volume, it would be easier if you could please send faxed requests to expedite your requests and in part, help us provide discharge notifications faster    Reference #[id number]

## 2019-01-02 ENCOUNTER — EVALUATION (OUTPATIENT)
Dept: PHYSICAL THERAPY | Facility: CLINIC | Age: 64
End: 2019-01-02
Payer: COMMERCIAL

## 2019-01-02 DIAGNOSIS — S32.9XXD CLOSED NONDISPLACED FRACTURE OF PELVIS WITH ROUTINE HEALING, UNSPECIFIED PART OF PELVIS, SUBSEQUENT ENCOUNTER: ICD-10-CM

## 2019-01-02 DIAGNOSIS — M25.511 CHRONIC RIGHT SHOULDER PAIN: Primary | ICD-10-CM

## 2019-01-02 DIAGNOSIS — G89.29 CHRONIC RIGHT SHOULDER PAIN: Primary | ICD-10-CM

## 2019-01-02 DIAGNOSIS — M75.41 IMPINGEMENT SYNDROME OF RIGHT SHOULDER: ICD-10-CM

## 2019-01-02 DIAGNOSIS — Z96.642 PRESENCE OF LEFT ARTIFICIAL HIP JOINT: ICD-10-CM

## 2019-01-02 PROCEDURE — G8991 OTHER PT/OT GOAL STATUS: HCPCS

## 2019-01-02 PROCEDURE — 97163 PT EVAL HIGH COMPLEX 45 MIN: CPT

## 2019-01-02 PROCEDURE — G8990 OTHER PT/OT CURRENT STATUS: HCPCS

## 2019-01-02 NOTE — PROGRESS NOTES
Patient sitting in chair after ambulating to bathroom and offers no complaints at this time  Patient sates that her pain is better controlled after dilaudid PO administration  Will continue to monitor  General

## 2019-01-02 NOTE — PROGRESS NOTES
PT Evaluation     Today's date: 2019  Patient name: Patricia Kirby  : 1955  MRN: [id number]  Referring provider: Wendy Hartley, *  Dx:   Encounter Diagnosis     ICD-10-CM    1  Chronic right shoulder pain M25 511     G89 29    2  Impingement syndrome of right shoulder M75 41    3  Closed nondisplaced fracture of pelvis with routine healing, unspecified part of pelvis, subsequent encounter S32  9XXD    4  Presence of left artificial hip joint Z96 642        Start Time: 1615  Stop Time: 1700  Total time in clinic (min): 45 minutes    Assessment  Assessment details: Patient is a 62 yo female presenting with R shoulder pain and gait abnormalities after fall in August  Patient presents with balance impairments, gait deviations, decreased R shoulder AROM and decreased R shoulder strength and muscle endurance  Patient has increased difficulty with reaching, dressing, walking, standing, and ascending and descending the stairs  Patient currently amb with 2 SPC and amb with the following gait deviations: trendelenburg, lateral lean, valgus at the knee and decreased step length  Patient presents as a high fall risk 2* TUG score of 40 seconds with 2 SPC  PT will address the noted impairments by performing UE and LE strengthening exercises, balance training, gait training, and functional activities to allow the patient to return to her PLOF  PT recommended 2x/week for 4-6 weeks c a guarded prognosis 2* PMHx  Impairments: abnormal gait, abnormal or restricted ROM, activity intolerance, impaired balance, impaired physical strength, lacks appropriate home exercise program, pain with function, safety issue, weight-bearing intolerance, poor posture  and poor body mechanics    Symptom irritability: moderateUnderstanding of Dx/Px/POC: good   Prognosis: fair    Goals  STG: In four weeks the patient will:    1  Be (I) with her HEP    2  Increase R shoulder strength to 4/5 MMT score to assist c reaching and ADLs   3  Increase R shoulder flexion and abduction AROM to 170 degrees to assist c ADLs  4  Increase LE strength to 4/5 to assist c stairs and amb  5  Perform tandem stance for 30 seconds with eyes open on a firm surface with minimal lateral sway  LTG: In six weeks, the patient will:    1  Increase FOTO score to 39 on LE and 59 on Shoulder to demonstrate improvements in symptoms and function  2  Demonstrate full R shoulder AROM without pain  3  Perform 30 mins of activity without pain  4  Increase LE strength to 5/5 MMT score to assist c functional activities  5  Perform the TUG in <30 seconds to demonstrate less of a fall risk  6  Perform tandem stance for 30 seconds with eyes open on a non- compliant surface with minimal lateral sway  Plan  Patient would benefit from: skilled physical therapy  Referral necessary: No  Planned modality interventions: cryotherapy and thermotherapy: hydrocollator packs  Planned therapy interventions: abdominal trunk stabilization, joint mobilization, manual therapy, massage, Salazar taping, balance, balance/weight bearing training, gait training, home exercise program, therapeutic exercise, therapeutic activities, stretching, strengthening, postural training, patient education and neuromuscular re-education  Frequency: 2x week  Duration in visits: 12  Duration in weeks: 6  Plan of Care beginning date: 1/2/2019  Plan of Care expiration date: 2/13/2019  Treatment plan discussed with: patient        Subjective Evaluation    History of Present Illness  Date of onset: 8/15/2018  Mechanism of injury: Patient noted in August 15, 2018 she fell on her bottom when amb with her SPCs  After x-rays were taken, she had a 2 fxs in her pelvis  Patient noted no surgery  She also noted R shoulder pain after the fall  Patient participated in home PT for the pelvis and shoulder and thinks that she needs more PT  Patient noted increased difficulty with amb with 2 SPC   Patient amb with a RW when in unfamiliar places  Patient is able to ascend and descend the stairs only with a railing  Patient had a 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  Patient had an cortisone injection to the R shoulder about 6 months ago and noted some relief  Recurrent probem    Quality of life: good    Pain  Current pain ratin (R shoulder)  At best pain ratin (R shoulder, after home PT)  At worst pain rating: 10 (R shoulder)  Location: R post capsule   Quality: dull ache and sharp (No numbness or tingling in R UE)  Relieving factors: ice and medications  Aggravating factors: standing, walking and stair climbing  Progression: worsening (R shoulder pain)    Social Support  Exterior steps/ramp assessed: 3 JJ  Lives in: [facility]  Lives with: spouse    Employment status: not working (Retired)  Hand dominance: right  Exercise history: none    Treatments  Previous treatment: home therapy (After fall in August)  Patient Goals  Patient goals for therapy: decreased pain, improved balance, increased motion, increased strength, independence with ADLs/IADLs and return to sport/leisure activities  Patient goal: "decreasing pain and increasing strength "        Objective     Postural Observations  Seated posture: fair  Standing posture: fair        Palpation     Right Tenderness of the biceps, infraspinatus and supraspinatus       Neurological Testing     Sensation     Shoulder   Left Shoulder   Intact: light touch    Right Shoulder   Intact: light touch    Hip   Left Hip   Intact: light touch    Right Hip   Intact: light touch    Active Range of Motion   Left Shoulder   Normal active range of motion    Right Shoulder   Flexion: 140 degrees   Abduction: 130 degrees   External rotation BTH: C2   Internal rotation BTB: L4     Strength/Myotome Testing     Left Shoulder     Planes of Motion   Flexion: 4+   Abduction: 4+   External rotation at 0°: 3+   Internal rotation at 0°: 4     Isolated Muscles   Biceps: 4   Triceps: 4     Right Shoulder     Planes of Motion   Flexion: 3+ (Pain)   Abduction: 3+ (Pain)   External rotation at 0°: 3   Internal rotation at 0°: 3     Isolated Muscles   Biceps: 4   Triceps: 4     Left Hip   Planes of Motion   Flexion: 3+  Extension: 3+  Abduction: 4-  Adduction: 3+    Right Hip   Planes of Motion   Flexion: 3-  Extension: 3+  Abduction: 4-  Adduction: 3+    Additional Strength Details  Knee flexion: 3+/5  Knee extension: 4/5  DF: 4/5  PF: 4/5    Tests     Right Shoulder   Positive drop arm, empty can, full can and Neer's  Negative belly press and lift-off  Ambulation     Observational Gait   Gait: asymmetric   Decreased walking speed and stride length       Additional Observational Gait Details  Valgus at L knee    Comments   TU seconds with 2 SPCs    FTEO: 30 seconds  FTEC: 20 seconds moderate sway  Tandem: L 8 seconds, R 10 seconds          Flowsheet Rows      Most Recent Value   PT/OT G-Codes   Current Score  -- [Shoulder: 48,  LE: 24]   Projected Score  -- [Shoulder: 59,  LE: 39]   FOTO information reviewed  Yes   Assessment Type  Evaluation   G code set  Other PT/OT Primary   Other PT Primary Current Status ()  CL   Other PT Primary Goal Status ()  CK          Precautions: Pelvis fx on 8/15/18, osteoporosis, L JVOANI, L TKR    Daily Treatment Diary     Manual                                                                                   Exercise Diary              LE program:             Bike nv            Gait training nv            bridge nv            clams nv            Hip add nv            Hip abd nv            Tandem stance nv            FTEO/FTEC nv            Step ups nv                                      UE program             Pulleys nv            AAROM flexion, abd, ext with cane nv            Shoulder isometrics: flex, ext, abd, ER, IR nv Modalities  1/2            HP/CP PRN np

## 2019-01-02 NOTE — LETTER
January 3, 2019    [id number]    Patient: Jemma Grier   YOB: 1955   Date of Visit: 2019     Encounter Diagnosis     ICD-10-CM    1  Chronic right shoulder pain M25 511     G89 29    2  Impingement syndrome of right shoulder M75 41    3  Closed nondisplaced fracture of pelvis with routine healing, unspecified part of pelvis, subsequent encounter S32  9XXD    4  Presence of left artificial hip joint N81 069        Dear Dr Damien Garcia:    Please review the attached Plan of Care from [facility] recent visit  Please verify that you agree therapy should continue by signing the attached document and sending it back to our office  If you have any questions or concerns, please don't hesitate to call  Sincerely,    Adri Pruett, PT      Referring Provider:      I certify that I have read the below Plan of Care and certify the need for these services furnished under this plan of treatment while under my care  Kaleb Zeng MD  [address] Tr[address]: [phone]          PT Evaluation     Today's date: 2019  Patient name: Jemma Grier  : 1955  MRN: [id number]  Referring provider: Keith Faust, *  Dx:   Encounter Diagnosis     ICD-10-CM    1  Chronic right shoulder pain M25 511     G89 29    2  Impingement syndrome of right shoulder M75 41    3  Closed nondisplaced fracture of pelvis with routine healing, unspecified part of pelvis, subsequent encounter S32  9XXD    4   Presence of left artificial hip joint Z96 642        Start Time: 1615  Stop Time: 1700  Total time in clinic (min): 45 minutes    Assessment  Assessment details: Patient is a 60 yo female presenting with R shoulder pain and gait abnormalities after fall in August  Patient presents with balance impairments, gait deviations, decreased R shoulder AROM and decreased R shoulder strength and muscle endurance  Patient has increased difficulty with reaching, dressing, walking, standing, and ascending and descending the stairs  Patient currently amb with 2 SPC and amb with the following gait deviations: trendelenburg, lateral lean, valgus at the knee and decreased step length  Patient presents as a high fall risk 2* TUG score of 40 seconds with 2 SPC  PT will address the noted impairments by performing UE and LE strengthening exercises, balance training, gait training, and functional activities to allow the patient to return to her PLOF  PT recommended 2x/week for 4-6 weeks c a guarded prognosis 2* PMHx  Impairments: abnormal gait, abnormal or restricted ROM, activity intolerance, impaired balance, impaired physical strength, lacks appropriate home exercise program, pain with function, safety issue, weight-bearing intolerance, poor posture  and poor body mechanics    Symptom irritability: moderateUnderstanding of Dx/Px/POC: good   Prognosis: fair    Goals  STG: In four weeks the patient will:    1  Be (I) with her HEP  2  Increase R shoulder strength to 4/5 MMT score to assist c reaching and ADLs  3  Increase R shoulder flexion and abduction AROM to 170 degrees to assist c ADLs  4  Increase LE strength to 4/5 to assist c stairs and amb  5  Perform tandem stance for 30 seconds with eyes open on a firm surface with minimal lateral sway  LTG: In six weeks, the patient will:    1  Increase FOTO score to 39 on LE and 59 on Shoulder to demonstrate improvements in symptoms and function  2  Demonstrate full R shoulder AROM without pain  3  Perform 30 mins of activity without pain  4  Increase LE strength to 5/5 MMT score to assist c functional activities  5  Perform the TUG in <30 seconds to demonstrate less of a fall risk  6  Perform tandem stance for 30 seconds with eyes open on a non- compliant surface with minimal lateral sway       Plan  Patient would benefit from: skilled physical therapy  Referral necessary: No  Planned modality interventions: cryotherapy and thermotherapy: hydrocollator packs  Planned therapy interventions: abdominal trunk stabilization, joint mobilization, manual therapy, massage, Salazar taping, balance, balance/weight bearing training, gait training, home exercise program, therapeutic exercise, therapeutic activities, stretching, strengthening, postural training, patient education and neuromuscular re-education  Frequency: 2x week  Duration in visits: 12  Duration in weeks: 6  Plan of Care beginning date: 2019  Plan of Care expiration date: 2019  Treatment plan discussed with: patient        Subjective Evaluation    History of Present Illness  Date of onset: 8/15/2018  Mechanism of injury: Patient noted in August 15, 2018 she fell on her bottom when amb with her SPCs  After x-rays were taken, she had a 2 fxs in her pelvis  Patient noted no surgery  She also noted R shoulder pain after the fall  Patient participated in home PT for the pelvis and shoulder and thinks that she needs more PT  Patient noted increased difficulty with amb with 2 SPC  Patient amb with a RW when in unfamiliar places  Patient is able to ascend and descend the stairs only with a railing  Patient had a 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  Patient had an cortisone injection to the R shoulder about 6 months ago and noted some relief  Recurrent probem    Quality of life: good    Pain  Current pain ratin (R shoulder)  At best pain ratin (R shoulder, after home PT)  At worst pain rating: 10 (R shoulder)  Location: R post capsule   Quality: dull ache and sharp (No numbness or tingling in R UE)  Relieving factors: ice and medications  Aggravating factors: standing, walking and stair climbing  Progression: worsening (R shoulder pain)    Social Support  Exterior steps/ramp assessed: 3 JJ    Lives in: [place]  Lives with: spouse    Employment status: not working (Retired)  Hand dominance: right  Exercise history: none    Treatments  Previous treatment: home therapy (After fall in August)  Patient Goals  Patient goals for therapy: decreased pain, improved balance, increased motion, increased strength, independence with ADLs/IADLs and return to sport/leisure activities  Patient goal: "decreasing pain and increasing strength "        Objective     Postural Observations  Seated posture: fair  Standing posture: fair        Palpation     Right Tenderness of the biceps, infraspinatus and supraspinatus  Neurological Testing     Sensation     Shoulder   Left Shoulder   Intact: light touch    Right Shoulder   Intact: light touch    Hip   Left Hip   Intact: light touch    Right Hip   Intact: light touch    Active Range of Motion   Left Shoulder   Normal active range of motion    Right Shoulder   Flexion: 140 degrees   Abduction: 130 degrees   External rotation BTH: C2   Internal rotation BTB: L4     Strength/Myotome Testing     Left Shoulder     Planes of Motion   Flexion: 4+   Abduction: 4+   External rotation at 0°:  3+   Internal rotation at 0°:  4     Isolated Muscles   Biceps: 4   Triceps: 4     Right Shoulder     Planes of Motion   Flexion: 3+ (Pain)   Abduction: 3+ (Pain)   External rotation at 0°:  3   Internal rotation at 0°:  3     Isolated Muscles   Biceps: 4   Triceps: 4     Left Hip   Planes of Motion   Flexion: 3+  Extension: 3+  Abduction: 4-  Adduction: 3+    Right Hip   Planes of Motion   Flexion: 3-  Extension: 3+  Abduction: 4-  Adduction: 3+    Additional Strength Details  Knee flexion: 3+/5  Knee extension: 4/5  DF: 4/5  PF: 4/5    Tests     Right Shoulder   Positive drop arm, empty can, full can and Neer's  Negative belly press and lift-off  Ambulation     Observational Gait   Gait: asymmetric   Decreased walking speed and stride length       Additional Observational Gait Details  Valgus at L knee    Comments   TU seconds with 2 SPCs    FTEO: 30 seconds  FTEC: 20 seconds moderate sway  Tandem: L 8 seconds, R 10 seconds          Flowsheet Rows      Most Recent Value   PT/OT G-Codes   Current Score  -- [Shoulder: 48,  LE: 24]   Projected Score  -- [Shoulder: 59,  LE: 39]   FOTO information reviewed  Yes   Assessment Type  Evaluation   G code set  Other PT/OT Primary   Other PT Primary Current Status ()  CL   Other PT Primary Goal Status ()  CK          Precautions: Pelvis fx on 8/15/18, osteoporosis, L JOVANI, L TKR    Daily Treatment Diary     Manual  /2                                                                                 Exercise Diary  /2            LE program:             Bike nv            Gait training nv            bridge nv            clams nv            Hip add nv            Hip abd nv            Tandem stance nv            FTEO/FTEC nv            Step ups nv                                      UE program             Pulleys nv            AAROM flexion, abd, ext with cane nv            Shoulder isometrics: flex, ext, abd, ER, IR nv                                                                    Modalities  1/2            HP/CP PRN np

## 2019-01-03 ENCOUNTER — OFFICE VISIT (OUTPATIENT)
Dept: PHYSICAL THERAPY | Facility: CLINIC | Age: 64
End: 2019-01-03
Payer: COMMERCIAL

## 2019-01-03 DIAGNOSIS — M25.511 CHRONIC RIGHT SHOULDER PAIN: Primary | ICD-10-CM

## 2019-01-03 DIAGNOSIS — S32.9XXD CLOSED NONDISPLACED FRACTURE OF PELVIS WITH ROUTINE HEALING, UNSPECIFIED PART OF PELVIS, SUBSEQUENT ENCOUNTER: ICD-10-CM

## 2019-01-03 DIAGNOSIS — Z96.642 PRESENCE OF LEFT ARTIFICIAL HIP JOINT: ICD-10-CM

## 2019-01-03 DIAGNOSIS — M75.41 IMPINGEMENT SYNDROME OF RIGHT SHOULDER: ICD-10-CM

## 2019-01-03 DIAGNOSIS — G89.29 CHRONIC RIGHT SHOULDER PAIN: Primary | ICD-10-CM

## 2019-01-03 PROCEDURE — 97112 NEUROMUSCULAR REEDUCATION: CPT

## 2019-01-03 PROCEDURE — 97110 THERAPEUTIC EXERCISES: CPT

## 2019-01-03 PROCEDURE — 97140 MANUAL THERAPY 1/> REGIONS: CPT

## 2019-01-03 NOTE — PROGRESS NOTES
Daily Note     Today's date: 1/3/2019  Patient name: Juan Manuel Mclaughlin  : 1955  MRN: [id number]  Referring provider: Devorah Baumann, *  Dx:   Encounter Diagnosis     ICD-10-CM    1  Chronic right shoulder pain M25 511     G89 29    2  Impingement syndrome of right shoulder M75 41    3  Closed nondisplaced fracture of pelvis with routine healing, unspecified part of pelvis, subsequent encounter S32  9XXD    4  Presence of left artificial hip joint Z96 [id number]        Start Time: 1135  Stop Time: 1215  Total time in clinic (min): 40 minutes    Subjective: Patient notes 6/10 pain in the shoulder at the start of the session  Objective: See treatment diary below      Precautions: Pelvis fx on 8/15/18, osteoporosis, L JOVANI, L TKR    Daily Treatment Diary     Manual  1/2 1/3           R shoulder PROM  5' MW           R shoulder posterior mobs and distraction  5' MW                                                      Exercise Diary  1/2 1/3           LE program:             Bike nv            Gait training nv            bridge nv            clams nv            Hip add nv            Hip abd nv            Tandem stance nv            FTEO/FTEC nv            Step ups nv                                      UE program             Pulleys nv 5'           AAROM flexion, abd, ER with cane nv x20 ea           Shoulder isometrics: flex, ext, abd, ER, IR nv            Pec stretch             scap retractions seated  x20 ea, 5" hold                                         Modalities  1/2 1/3           HP/CP PRN np 10'CP                                         Assessment: PT session focused on the R UE  Patient performed AAROM exercises with the cane and patient noted increased pain at the start of the reps  At the end of the reps and with ice at the end of the session, patient noted improvements  PT added to her HEP  Patient has a decreased activity tolerance   Patient would benefit from continued PT to allow the patient to return to her PLOF  Plan: Continue per plan of care  LE exercises and balance at next session

## 2019-01-07 ENCOUNTER — OFFICE VISIT (OUTPATIENT)
Dept: PHYSICAL THERAPY | Facility: CLINIC | Age: 64
End: 2019-01-07
Payer: COMMERCIAL

## 2019-01-07 DIAGNOSIS — G89.29 CHRONIC RIGHT SHOULDER PAIN: Primary | ICD-10-CM

## 2019-01-07 DIAGNOSIS — M75.41 IMPINGEMENT SYNDROME OF RIGHT SHOULDER: ICD-10-CM

## 2019-01-07 DIAGNOSIS — Z96.642 PRESENCE OF LEFT ARTIFICIAL HIP JOINT: ICD-10-CM

## 2019-01-07 DIAGNOSIS — M25.511 CHRONIC RIGHT SHOULDER PAIN: Primary | ICD-10-CM

## 2019-01-07 DIAGNOSIS — S32.9XXD CLOSED NONDISPLACED FRACTURE OF PELVIS WITH ROUTINE HEALING, UNSPECIFIED PART OF PELVIS, SUBSEQUENT ENCOUNTER: ICD-10-CM

## 2019-01-07 PROCEDURE — 97112 NEUROMUSCULAR REEDUCATION: CPT

## 2019-01-07 PROCEDURE — 97110 THERAPEUTIC EXERCISES: CPT

## 2019-01-07 PROCEDURE — 97530 THERAPEUTIC ACTIVITIES: CPT

## 2019-01-07 NOTE — PROGRESS NOTES
Daily Note     Today's date: 2019  Patient name: Evelin Vasquez  : 1955  MRN: [id number]  Referring provider: Yady Egan, *  Dx:   Encounter Diagnosis     ICD-10-CM    1  Chronic right shoulder pain M25 511     G89 29    2  Impingement syndrome of right shoulder M75 41    3  Closed nondisplaced fracture of pelvis with routine healing, unspecified part of pelvis, subsequent encounter S32  9XXD    4  Presence of left artificial hip joint Z96 642        Start Time: 1400  Stop Time: 1445  Total time in clinic (min): 45 minutes    Subjective: Patient noted she was sore after last session, however feels good today  Objective: See treatment diary below      Precautions: Pelvis fx on 8/15/18, osteoporosis, L JOVANI, L TKR    Daily Treatment Diary     Manual  1/2 1/3 1/7          R shoulder PROM  5' MW np          R shoulder posterior mobs and distraction  5' MW np                                                     Exercise Diary  1/2 1/3 1/7          LE program:             Bike nv            Gait training nv            bridge nv  2x10          clams nv  2x10 ea          Hip add + TrA nv  2x10, 5"          Hip abd + TrA nv  2x10,  5"  RTB  supine          Tandem stance nv            FTEO/FTEC nv            Sit <>stands nv  x8          LAQ   2x10                       UE program             Pulleys nv 5'           AAROM flexion, abd, ER with cane nv x20 ea           Shoulder isometrics: flex, ext, abd, ER, IR nv            Pec stretch             scap retractions seated  x20 ea, 5" hold                                     HEP: AAROM shoulder flex, abd, ER; scap retraction, bridge, glute set, hip abd (RTB), hip add    Modalities  1/2 1/3           HP/CP PRN np 10'CP                                           Assessment: PT reviewed UE HEP with patient  PT session focused on LE strengthening for amb   Patient required min A x 1 for sit<>stands and required mod VCs for weight shifting and glute activation  PT added to patient's LE HEP  Patient would benefit from continued PT to allow the patient to return to her PLOF  Plan: Continue per plan of care

## 2019-01-08 ENCOUNTER — OFFICE VISIT (OUTPATIENT)
Dept: PHYSICAL THERAPY | Facility: CLINIC | Age: 64
End: 2019-01-08
Payer: COMMERCIAL

## 2019-01-08 DIAGNOSIS — G89.29 CHRONIC RIGHT SHOULDER PAIN: Primary | ICD-10-CM

## 2019-01-08 DIAGNOSIS — M75.41 IMPINGEMENT SYNDROME OF RIGHT SHOULDER: ICD-10-CM

## 2019-01-08 DIAGNOSIS — S32.9XXD CLOSED NONDISPLACED FRACTURE OF PELVIS WITH ROUTINE HEALING, UNSPECIFIED PART OF PELVIS, SUBSEQUENT ENCOUNTER: ICD-10-CM

## 2019-01-08 DIAGNOSIS — M25.511 CHRONIC RIGHT SHOULDER PAIN: Primary | ICD-10-CM

## 2019-01-08 DIAGNOSIS — Z96.642 PRESENCE OF LEFT ARTIFICIAL HIP JOINT: ICD-10-CM

## 2019-01-08 PROCEDURE — 97112 NEUROMUSCULAR REEDUCATION: CPT

## 2019-01-08 PROCEDURE — 97140 MANUAL THERAPY 1/> REGIONS: CPT

## 2019-01-08 PROCEDURE — 97110 THERAPEUTIC EXERCISES: CPT

## 2019-01-08 NOTE — PROGRESS NOTES
Daily Note     Today's date: 2019  Patient name: Sharon Thomas  : 1955  MRN: [id number]  Referring provider: Cuba Leon, *  Dx:   Encounter Diagnosis     ICD-10-CM    1  Chronic right shoulder pain M25 511     G89 29    2  Impingement syndrome of right shoulder M75 41    3  Closed nondisplaced fracture of pelvis with routine healing, unspecified part of pelvis, subsequent encounter S32  9XXD    4  Presence of left artificial hip joint Z96 [id number]        Start Time: 1445  Stop Time: 1530  Total time in clinic (min): 45 minutes    Subjective: Patient noted she has pain in the R shoulder when she sleeps  Objective: See treatment diary below      Precautions: Pelvis fx on 8/15/18, osteoporosis, L JOVANI, L TKR    Daily Treatment Diary     Manual  1/2 1/3 1/7 1/8         R shoulder PROM  5' MW np MW         R shoulder posterior mobs and distraction  11' MW np MW                                                    Exercise Diary  1/2 1/3 1/7 1/8         LE program:             Bike nv            Gait training nv            bridge nv  2x10          clams nv  2x10 ea          Hip add + TrA nv  2x10, 5"          Hip abd + TrA nv  2x10,  5"  RTB  supine          Tandem stance nv            FTEO/FTEC nv            Sit <>stands nv  x8 x5         LAQ   2x10                       UE program             Pulleys nv 5'  5'         AAROM flexion, abd, ER with cane nv x20 ea  x10 abd seated         Shoulder isometrics: flex, ext, abd, ER, IR nv   X15, 5" hold ea         Pec stretch             scap retractions seated  x20 ea, 5" hold  x20 ea, 5" hold                                   HEP: AAROM shoulder flex, abd, ER; scap retraction, bridge, glute set, hip abd (RTB), hip add, shoulder isometrics    Modalities  2 1/3           HP/CP PRN np 10'CP                                             Assessment: PT introduced isometric shoulder exercises to assist c stability in the shoulder   Patient required min VCs for new exercises  Patient continued to required mod VCs for glute activation during sit> stands  Patient noted improvements with pain levels after mobs were performed  Patient would benefit from continued PT to allow the patient to return to her PLOF  Plan: Continue per plan of care

## 2019-01-14 ENCOUNTER — OFFICE VISIT (OUTPATIENT)
Dept: PHYSICAL THERAPY | Facility: CLINIC | Age: 64
End: 2019-01-14
Payer: COMMERCIAL

## 2019-01-14 DIAGNOSIS — Z96.642 PRESENCE OF LEFT ARTIFICIAL HIP JOINT: ICD-10-CM

## 2019-01-14 DIAGNOSIS — G89.29 CHRONIC RIGHT SHOULDER PAIN: Primary | ICD-10-CM

## 2019-01-14 DIAGNOSIS — M25.511 CHRONIC RIGHT SHOULDER PAIN: Primary | ICD-10-CM

## 2019-01-14 DIAGNOSIS — S32.9XXD CLOSED NONDISPLACED FRACTURE OF PELVIS WITH ROUTINE HEALING, UNSPECIFIED PART OF PELVIS, SUBSEQUENT ENCOUNTER: ICD-10-CM

## 2019-01-14 DIAGNOSIS — M75.41 IMPINGEMENT SYNDROME OF RIGHT SHOULDER: ICD-10-CM

## 2019-01-14 PROCEDURE — 97112 NEUROMUSCULAR REEDUCATION: CPT

## 2019-01-14 PROCEDURE — 97110 THERAPEUTIC EXERCISES: CPT

## 2019-01-14 PROCEDURE — 97140 MANUAL THERAPY 1/> REGIONS: CPT

## 2019-01-14 NOTE — PROGRESS NOTES
Daily Note     Today's date: 2019  Patient name: Jemma Grier  : 1955  MRN: [id number]  Referring provider: Keith Faust, *  Dx:   Encounter Diagnosis     ICD-10-CM    1  Chronic right shoulder pain M25 511     G89 29    2  Impingement syndrome of right shoulder M75 41    3  Closed nondisplaced fracture of pelvis with routine healing, unspecified part of pelvis, subsequent encounter S32  9XXD    4  Presence of left artificial hip joint Z96 [id number]        Start Time: 1530  Stop Time: 1640  Total time in clinic (min): 70 minutes    Subjective: Patient noted after last session she did not have pain in the shoulder, however she felt it the next day  Patient noted she has questions about the isometrics  Patient noted she is performing her LE program at home and wants to focus on the shoulder         Objective: See treatment diary below      Precautions: Pelvis fx on 8/15/18, osteoporosis, L JOVANI, L TKR    Daily Treatment Diary     Manual  1/2 1/3 1/7 1/8 1/14        R shoulder PROM  5' MW np MW MW        R shoulder posterior mobs and distraction  5' MW np MW MW                                                   Exercise Diary  1/2 1/3 1/7 1/8 1/14        LE program:             Bike nv            Gait training nv            bridge nv  2x10          clams nv  2x10 ea          Hip add + TrA nv  2x10, 5"          Hip abd + TrA nv  2x10,  5"  RTB  supine          Tandem stance nv            FTEO/FTEC nv            Sit <>stands nv  x8 x5         LAQ   2x10                       UE program             Pulleys nv 5'  5' 5'/ UBE back 5'        AAROM flexion, abd, ER with cane nv x20 ea  x10 abd seated 2x10 ea seated        Shoulder isometrics: flex, ext, abd, ER, IR nv   X15, 5" hold ea x20 ea, 5" hold        Pec stretch     nv        scap retractions seated  x20 ea, 5" hold  x20 ea, 5" hold x20  Ea, 5" ea        Serratus punches     X20, 3" hold        No monies     x20 ea,  5" hold  RTB        HEP: AAROM shoulder flex, abd, ER; scap retraction, bridge, glute set, hip abd (RTB), hip add, shoulder isometrics    Modalities  1/2 1/3 1/14          HP/CP PRN np 10'CP 10' CP post                                          Assessment: PT introduced no monies and serratus punches to assist c scapular strengthening  PT noted the patient's R scapula in anteriorly tipping which places her shoulder at a disadvantage during functional activities  Patient is able to minimally correct the scapula position  Patient noted improvements at the end of the session  Patient would benefit from continued PT to allow the patient to return to her PLOF  Plan: Continue per plan of care

## 2019-01-17 ENCOUNTER — OFFICE VISIT (OUTPATIENT)
Dept: PHYSICAL THERAPY | Facility: CLINIC | Age: 64
End: 2019-01-17
Payer: COMMERCIAL

## 2019-01-17 DIAGNOSIS — M25.511 CHRONIC RIGHT SHOULDER PAIN: Primary | ICD-10-CM

## 2019-01-17 DIAGNOSIS — M75.41 IMPINGEMENT SYNDROME OF RIGHT SHOULDER: ICD-10-CM

## 2019-01-17 DIAGNOSIS — G89.29 CHRONIC RIGHT SHOULDER PAIN: Primary | ICD-10-CM

## 2019-01-17 DIAGNOSIS — S32.9XXD CLOSED NONDISPLACED FRACTURE OF PELVIS WITH ROUTINE HEALING, UNSPECIFIED PART OF PELVIS, SUBSEQUENT ENCOUNTER: ICD-10-CM

## 2019-01-17 DIAGNOSIS — Z96.642 PRESENCE OF LEFT ARTIFICIAL HIP JOINT: ICD-10-CM

## 2019-01-17 PROCEDURE — 97140 MANUAL THERAPY 1/> REGIONS: CPT

## 2019-01-17 PROCEDURE — 97112 NEUROMUSCULAR REEDUCATION: CPT

## 2019-01-17 PROCEDURE — 97110 THERAPEUTIC EXERCISES: CPT

## 2019-01-17 NOTE — PROGRESS NOTES
Daily Note     Today's date: 2019  Patient name: Jemma Grier  : 1955  MRN: [id number]  Referring provider: Keith Faust, *  Dx:   Encounter Diagnosis     ICD-10-CM    1  Chronic right shoulder pain M25 511     G89 29    2  Impingement syndrome of right shoulder M75 41    3  Closed nondisplaced fracture of pelvis with routine healing, unspecified part of pelvis, subsequent encounter S32  9XXD    4  Presence of left artificial hip joint [id number] [id number]        Start Time: 945  Stop Time: 1030  Total time in clinic (min): 45 minutes    Subjective: Patient noted after last session, she had some spasms in the R UE and noted she is tender in the biceps region         Objective: See treatment diary below      Precautions: Pelvis fx on 8/15/18, osteoporosis, L JOVANI, L TKR    Daily Treatment Diary     Manual  1/2 1/3 1/7 1/8 1/14 1/17       R shoulder PROM  5' MW np MW MW MW       R shoulder posterior mobs and distraction  5' MW np MW MW MW                                                  Exercise Diary  1/2 1/3 1/7 1/8 1/14 1/17       LE program:             Bike nv            Gait training nv            bridge nv  2x10   2x10       clams nv  2x10 ea          Hip add + TrA nv  2x10, 5"          Hip abd + TrA nv  2x10,  5"  RTB  supine          Tandem stance nv            FTEO/FTEC nv            Sit <>stands nv  x8 x5         LAQ   2x10                       UE program             Pulleys nv 5'  5' 5'/ UBE back 5' UBE  5'  bkwd       AAROM flexion, abd, ER with cane nv x20 ea  x10 abd seated 2x10 ea seated 2x10 ea       Shoulder isometrics: flex, ext, abd, ER, IR nv   X15, 5" hold ea x20 ea, 5" hold x20 ea  5" hold       Pec stretch & biceps stretch     nv 5x15" ea       scap retractions seated  x20 ea, 5" hold  x20 ea, 5" hold x20  Ea, 5" ea x20 ea,  5" hold       Serratus punches     X20, 3" hold x20  3" hold       No monies     x20 ea,  5" hold  RTB x20  5" ea  RTB       HEP: AAROM shoulder flex, abd, ER; scap retraction, bridge, glute set, hip abd (RTB), hip add, shoulder isometrics, biceps stretch, pec stretch    Modalities  1/2 1/3 1/14 1/17         HP/CP PRN np 10'CP 10' CP post 5' CP post                                           Assessment: PT introduced pec stretch and biceps stretch to assist c spasms and tissue mobility  Patient noted improvements after stretches were performed  Patient noted no spasms throughout the session  Patient continues to demonstrate improvements with strength and pain levels 2* improved FOTO score  Patient would benefit from continued PT to allow the patient to return to her PLOF  Plan: Continue per plan of care

## 2019-01-21 ENCOUNTER — OFFICE VISIT (OUTPATIENT)
Dept: PHYSICAL THERAPY | Facility: CLINIC | Age: 64
End: 2019-01-21
Payer: COMMERCIAL

## 2019-01-21 DIAGNOSIS — M25.511 CHRONIC RIGHT SHOULDER PAIN: Primary | ICD-10-CM

## 2019-01-21 DIAGNOSIS — G89.29 CHRONIC RIGHT SHOULDER PAIN: Primary | ICD-10-CM

## 2019-01-21 DIAGNOSIS — Z96.642 PRESENCE OF LEFT ARTIFICIAL HIP JOINT: ICD-10-CM

## 2019-01-21 DIAGNOSIS — M75.41 IMPINGEMENT SYNDROME OF RIGHT SHOULDER: ICD-10-CM

## 2019-01-21 DIAGNOSIS — S32.9XXD CLOSED NONDISPLACED FRACTURE OF PELVIS WITH ROUTINE HEALING, UNSPECIFIED PART OF PELVIS, SUBSEQUENT ENCOUNTER: ICD-10-CM

## 2019-01-21 PROCEDURE — 97110 THERAPEUTIC EXERCISES: CPT | Performed by: PHYSICAL THERAPIST

## 2019-01-21 PROCEDURE — 97112 NEUROMUSCULAR REEDUCATION: CPT | Performed by: PHYSICAL THERAPIST

## 2019-01-21 NOTE — PROGRESS NOTES
Daily Note     Today's date: 2019  Patient name: Ariana Cuenca  : 1955  MRN: [id number]  Referring provider: Kaden Sepulveda, *  Dx:   Encounter Diagnosis     ICD-10-CM    1  Chronic right shoulder pain M25 511     G89 29    2  Impingement syndrome of right shoulder M75 41    3  Closed nondisplaced fracture of pelvis with routine healing, unspecified part of pelvis, subsequent encounter S32  9XXD    4  Presence of left artificial hip joint Z96 [id number]        Start Time: 1402  Stop Time: 1448  Total time in clinic (min): 46 minutes    Subjective: Patient reports she has aching pain in R shoulder the morning, and she is hoping eventually that will go away       Objective: See treatment diary below      Precautions: Pelvis fx on 8/15/18, osteoporosis, L JOVANI, L TKR    Daily Treatment Diary     Manual  1/2 1/3 1/7 1/8 1/14 1/17 1/21      R shoulder PROM  5' MW np MW MW MW np      R shoulder posterior mobs and distraction  5' MW np MW MW MW np                                                 Exercise Diary  1/2 1/3 1/7 1/8 1/14 1/17 1/21      LE program:             Bike nv            Gait training nv            bridge nv  2x10   2x10       clams nv  2x10 ea          Hip add + TrA nv  2x10, 5"          Hip abd + TrA nv  2x10,  5"  RTB  supine          Tandem stance nv            FTEO/FTEC nv            Sit <>stands nv  x8 x5         LAQ   2x10                       UE program             Pulleys nv 5'  5' 5'/ UBE back 5' UBE  5'  bkwd UBE 5' bkwd      AAROM flexion, abd, ER with cane nv x20 ea  x10 abd seated 2x10 ea seated 2x10 ea 2x10 ea      Shoulder isometrics: flex, ext, abd, ER, IR nv   X15, 5" hold ea x20 ea, 5" hold x20 ea  5" hold x20 ea  5" hold      Pec stretch & biceps stretch     nv 5x15" ea 5 x 15" ea      scap retractions seated  x20 ea, 5" hold  x20 ea, 5" hold x20  Ea, 5" ea x20 ea,  5" hold X 30 3" hold      Serratus punches     X20, 3" hold x20  3" hold X 20 3" hold 1#      Wall slides 10 x 10" holds flx      No monies     x20 ea,  5" hold  RTB x20  5" ea  RTB X 20 5" ea RTB with scap retraction      HEP: AAROM shoulder flex, abd, ER; scap retraction, bridge, glute set, hip abd (RTB), hip add, shoulder isometrics, biceps stretch, pec stretch    Modalities  1/2 1/3 1/14 1/17 1/21        HP/CP PRN np 10'CP 10' CP post 5' CP post Declined                                      Assessment: Tolerated treatment well and was able to progress reps for shoulder isometrics  Patient demonstrated fatigue post treatment and exhibited good technique with therapeutic exercises  Added wall slides into flexion, which improved stiffness in R shoulder  Also added 1# to serratus press, which she tolerated well  Plan: Continue per plan of care

## 2019-01-24 ENCOUNTER — OFFICE VISIT (OUTPATIENT)
Dept: PHYSICAL THERAPY | Facility: CLINIC | Age: 64
End: 2019-01-24
Payer: COMMERCIAL

## 2019-01-24 DIAGNOSIS — S32.9XXD CLOSED NONDISPLACED FRACTURE OF PELVIS WITH ROUTINE HEALING, UNSPECIFIED PART OF PELVIS, SUBSEQUENT ENCOUNTER: ICD-10-CM

## 2019-01-24 DIAGNOSIS — M75.41 IMPINGEMENT SYNDROME OF RIGHT SHOULDER: ICD-10-CM

## 2019-01-24 DIAGNOSIS — G89.29 CHRONIC RIGHT SHOULDER PAIN: Primary | ICD-10-CM

## 2019-01-24 DIAGNOSIS — M25.511 CHRONIC RIGHT SHOULDER PAIN: Primary | ICD-10-CM

## 2019-01-24 DIAGNOSIS — Z96.642 PRESENCE OF LEFT ARTIFICIAL HIP JOINT: ICD-10-CM

## 2019-01-24 PROCEDURE — 97140 MANUAL THERAPY 1/> REGIONS: CPT

## 2019-01-24 PROCEDURE — 97112 NEUROMUSCULAR REEDUCATION: CPT

## 2019-01-24 PROCEDURE — 97110 THERAPEUTIC EXERCISES: CPT

## 2019-01-24 NOTE — PROGRESS NOTES
Daily Note     Today's date: 2019  Patient name: Grace Mcmahan  : 1955  MRN: [id number]  Referring provider: Aaron Brar, *  Dx:   Encounter Diagnosis     ICD-10-CM    1  Chronic right shoulder pain M25 511     G89 29    2  Impingement syndrome of right shoulder M75 41    3  Closed nondisplaced fracture of pelvis with routine healing, unspecified part of pelvis, subsequent encounter S32  9XXD    4  Presence of left artificial hip joint Z96 642        Start Time: 1535  Stop Time: 1624  Total time in clinic (min): 49 minutes    Subjective: Patient noted her R shoulder is sore today  Patient thinks it is the weather         Objective: See treatment diary below      Precautions: Pelvis fx on 8/15/18, osteoporosis, L JOVANI, L TKR    Daily Treatment Diary     Manual  1/2 1/3 1/7 1/8 1/14 1/17 1/21 1/24     R shoulder PROM  5' MW np MW MW MW np MW     R shoulder posterior mobs and distraction  5' MW np MW MW MW np MW                                                Exercise Diary  1/2 1/3 1/7 1/8 1/14 1/17 1/21 1/24     LE program:             Bike nv            Gait training nv            bridge nv  2x10   2x10       clams nv  2x10 ea          Hip add + TrA nv  2x10, 5"          Hip abd + TrA nv  2x10,  5"  RTB  supine          Tandem stance nv            FTEO/FTEC nv            Sit <>stands nv  x8 x5         LAQ   2x10                       UE program             Pulleys nv 5'  5' 5'/ UBE back 5' UBE  5'  bkwd UBE 5' bkwd UBE  5' bkwd     AAROM flexion, abd, ER with cane nv x20 ea  x10 abd seated 2x10 ea seated 2x10 ea 2x10 ea nv     Shoulder isometrics: flex, ext, abd, ER, IR nv   X15, 5" hold ea x20 ea, 5" hold x20 ea  5" hold x20 ea  5" hold x20 ea  5" hold     Pec stretch & biceps stretch     nv 5x15" ea 5 x 15" ea 5x15"     scap retractions seated  x20 ea, 5" hold  x20 ea, 5" hold x20  Ea, 5" ea x20 ea,  5" hold X 30 3" hold x30  3" hold     Serratus punches     X20, 3" hold x20  3" hold X 20 3" hold 1# nv     Wall slides       10 x 10" holds flx nv     No monies     x20 ea,  5" hold  RTB x20  5" ea  RTB X 20 5" ea RTB with scap retraction x20  5" hold with scap retraction     HEP: AAROM shoulder flex, abd, ER; scap retraction, bridge, glute set, hip abd (RTB), hip add, shoulder isometrics, biceps stretch, pec stretch    Modalities  1/2 1/3 1/14 1/17 1/21 1/24       HP/CP PRN np 10'CP 10' CP post 5' CP post Declined 10' HP post                                       Assessment: Patient performed exercises with noted soreness throughout the session  Patient noted relief with manual distraction and a HP at the end of the session  Patient would benefit from continued PT to allow the patient to return to her PLOF  Plan: Continue per plan of care

## 2019-01-28 ENCOUNTER — OFFICE VISIT (OUTPATIENT)
Dept: PHYSICAL THERAPY | Facility: CLINIC | Age: 64
End: 2019-01-28
Payer: COMMERCIAL

## 2019-01-28 DIAGNOSIS — M75.41 IMPINGEMENT SYNDROME OF RIGHT SHOULDER: ICD-10-CM

## 2019-01-28 DIAGNOSIS — S32.9XXD CLOSED NONDISPLACED FRACTURE OF PELVIS WITH ROUTINE HEALING, UNSPECIFIED PART OF PELVIS, SUBSEQUENT ENCOUNTER: ICD-10-CM

## 2019-01-28 DIAGNOSIS — Z96.642 PRESENCE OF LEFT ARTIFICIAL HIP JOINT: ICD-10-CM

## 2019-01-28 DIAGNOSIS — G89.29 CHRONIC RIGHT SHOULDER PAIN: Primary | ICD-10-CM

## 2019-01-28 DIAGNOSIS — M25.511 CHRONIC RIGHT SHOULDER PAIN: Primary | ICD-10-CM

## 2019-01-28 PROCEDURE — 97140 MANUAL THERAPY 1/> REGIONS: CPT

## 2019-01-28 PROCEDURE — 97110 THERAPEUTIC EXERCISES: CPT

## 2019-01-28 PROCEDURE — 97112 NEUROMUSCULAR REEDUCATION: CPT

## 2019-01-28 NOTE — PROGRESS NOTES
Daily Note     Today's date: 2019  Patient name: Latrice Mora  : 1955  MRN: [id number]  Referring provider: Vandana Kebede, *  Dx:   Encounter Diagnosis     ICD-10-CM    1  Chronic right shoulder pain M25 511     G89 29    2  Impingement syndrome of right shoulder M75 41    3  Closed nondisplaced fracture of pelvis with routine healing, unspecified part of pelvis, subsequent encounter S32  9XXD    4  Presence of left artificial hip joint Z96 642         1:1 with PTA CR 2:55-3:55  MH 3:55-4:05  Subjective: Patient reports onset of left L-S pain the last several days- feels that performing canes stretches in standing versus seated may have triggered it  Notes difficulty reaching across to wash under left UE  Objective: See treatment diary below      Precautions: Pelvis fx on 8/15/18, osteoporosis, L JOVANI, L TKR    Daily Treatment Diary     Manual  1/2 1/3 1/7 1/8 1/14 1/17 1/21 1/24 1/28    R shoulder PROM  5' MW np MW MW MW np MW 10 mins    R shoulder posterior mobs and distraction  5' MW np MW MW MW np MW NP                                               Exercise Diary  1/2 1/3 1/7 1/8 1/14 1/17 1/21 1/24 1/28    LE program:             Bike nv            Gait training nv            bridge nv  2x10   2x10   NP    clams nv  2x10 ea          Hip add + TrA nv  2x10, 5"          Hip abd + TrA nv  2x10,  5"  RTB  supine          Tandem stance nv            FTEO/FTEC nv            Sit <>stands nv  x8 x5         LAQ   2x10                       UE program             Pulleys nv 5'  5' 5'/ UBE back 5' UBE  5'  bkwd UBE 5' bkwd UBE  5' bkwd UBE  5'  retro    AAROM flexion, abd, ER with cane nv x20 ea  x10 abd seated 2x10 ea seated 2x10 ea 2x10 ea nv 2x10 ea      Shoulder isometrics: flex, ext, abd, ER, IR nv   X15, 5" hold ea x20 ea, 5" hold x20 ea  5" hold x20 ea  5" hold x20 ea  5" hold 5"x20    Pec stretch & biceps stretch     nv 5x15" ea 5 x 15" ea 5x15" 15"x5 ea     scap retractions seated  x20 ea, 5" hold  x20 ea, 5" hold x20  Ea, 5" ea x20 ea,  5" hold X 30 3" hold x30  3" hold 3"x30    Serratus punches     X20, 3" hold x20  3" hold X 20 3" hold 1# nv 1#  Cane  3"x20    Wall slides       10 x 10" holds flx nv NV    No moneys     x20 ea,  5" hold  RTB x20  5" ea  RTB X 20 5" ea RTB with scap retraction x20  5" hold with scap retraction 5"x20    HEP: AAROM shoulder flex, abd, ER; scap retraction, bridge, glute set, hip abd (RTB), hip add, shoulder isometrics, biceps stretch, pec stretch    Modalities  1/2 1/3 1/14 1/17 1/21 1/24 1/28      HP/CP PRN np 10'CP 10' CP post 5' CP post Declined 10' HP post MH post  10'                          Assessment: Advised standing against wall with cane stretches to prevent lumbar extension  Consider adding posterior cap stretch  PROM progressing nicely  Patient would benefit from continued PT to allow the patient to return to her PLOF  Scheduled for RE NV  Plan: Continue per plan of care

## 2019-01-31 ENCOUNTER — OFFICE VISIT (OUTPATIENT)
Dept: PHYSICAL THERAPY | Facility: CLINIC | Age: 64
End: 2019-01-31
Payer: COMMERCIAL

## 2019-01-31 DIAGNOSIS — M25.511 CHRONIC RIGHT SHOULDER PAIN: Primary | ICD-10-CM

## 2019-01-31 DIAGNOSIS — S32.9XXD CLOSED NONDISPLACED FRACTURE OF PELVIS WITH ROUTINE HEALING, UNSPECIFIED PART OF PELVIS, SUBSEQUENT ENCOUNTER: ICD-10-CM

## 2019-01-31 DIAGNOSIS — G89.29 CHRONIC RIGHT SHOULDER PAIN: Primary | ICD-10-CM

## 2019-01-31 DIAGNOSIS — Z96.642 PRESENCE OF LEFT ARTIFICIAL HIP JOINT: ICD-10-CM

## 2019-01-31 DIAGNOSIS — M75.41 IMPINGEMENT SYNDROME OF RIGHT SHOULDER: ICD-10-CM

## 2019-01-31 PROCEDURE — 97110 THERAPEUTIC EXERCISES: CPT

## 2019-01-31 PROCEDURE — 97112 NEUROMUSCULAR REEDUCATION: CPT

## 2019-01-31 PROCEDURE — 97140 MANUAL THERAPY 1/> REGIONS: CPT

## 2019-01-31 NOTE — PROGRESS NOTES
Daily Note     Today's date: 2019  Patient name: Jerardo Church  : 1955  MRN: [id number]  Referring provider: Cookie Armando, *  Dx:   Encounter Diagnosis     ICD-10-CM    1  Chronic right shoulder pain M25 511     G89 29    2  Impingement syndrome of right shoulder M75 41    3  Closed nondisplaced fracture of pelvis with routine healing, unspecified part of pelvis, subsequent encounter S32  9XXD    4  Presence of left artificial hip joint Z96 642         1:1 with PTA CR 2:45-3:30  Unbilled 3:30-3:45  MH 3:45-3:55  Subjective: " Constant ache " with occasional sharp pain anterior shoulder  Objective: See treatment diary below      Precautions: Pelvis fx on 8/15/18, osteoporosis, L JOVANI, L TKR    Daily Treatment Diary     Manual  1/2 1/3 1/7 1/8 1/14 1/17 1/21 1/24 1/28 1/31   R shoulder PROM  5' MW np MW MW MW np MW 10 mins 15 mins  +post cap   R shoulder posterior mobs and distraction  5' MW np MW MW MW np MW NP NP                                 Exercise Diary  1/2 1/3 1/7 1/8 1/14 1/17 1/21 1/24 1/28 1/31   LE program:             Bike nv            Gait training nv            bridge nv  2x10   2x10   NP    clams nv  2x10 ea          Hip add + TrA nv  2x10, 5"          Hip abd + TrA nv  2x10,  5"  RTB  supine          Tandem stance nv            FTEO/FTEC nv            Sit <>stands nv  x8 x5         LAQ   2x10                       UE program             Pulleys nv 5'  5' 5'/ UBE back 5' UBE  5'  bkwd UBE 5' bkwd UBE  5' bkwd UBE  5'  retro UBE  5'  Retro   AAROM flexion, abd, ER with cane nv x20 ea  x10 abd seated 2x10 ea seated 2x10 ea 2x10 ea nv 2x10 ea  2x10  Ea     Shoulder isometrics: flex, ext, abd, ER, IR nv   X15, 5" hold ea x20 ea, 5" hold x20 ea  5" hold x20 ea  5" hold x20 ea  5" hold 5"x20 5"x20   Pec stretch & biceps stretch     nv 5x15" ea 5 x 15" ea 5x15" 15"x5 ea  15"x5 ea    scap retractions   seated  x20 ea, 5" hold  x20 ea, 5" hold x20  Ea, 5" ea x20 ea,  5" hold X 30 3" hold x30  3" hold 3"x30 NP   Serratus punches     X20, 3" hold x20  3" hold X 20 3" hold 1# nv 1#  Cane  3"x20 1#  Cane  3"x20   Wall slides       10 x 10" holds flx nv NV NV   No moneys     x20 ea,  5" hold  RTB x20  5" ea  RTB X 20 5" ea RTB with scap retraction x20  5" hold with scap retraction 5"x20 RTB  5"x20   Posterior cap stretch          10"x10   HEP: AAROM shoulder flex, abd, ER; scap retraction, bridge, glute set, hip abd (RTB), hip add, shoulder isometrics, biceps stretch, pec stretch    Modalities  1/2 1/3 1/14 1/17 1/21 1/24 1/28 1/31     HP/CP PRN np 10'CP 10' CP post 5' CP post Declined 10' HP post MH post  8'  [address]  Post  10'             Assessment: Posterior capsule stretch added with limited tolerance- gentle only  Patient would benefit from continued PT to allow the patient to return to her PLOF  Scheduled for RE NV  Plan: Continue per plan of care

## 2019-02-05 ENCOUNTER — EVALUATION (OUTPATIENT)
Dept: PHYSICAL THERAPY | Facility: CLINIC | Age: 64
End: 2019-02-05
Payer: COMMERCIAL

## 2019-02-05 DIAGNOSIS — G89.29 CHRONIC RIGHT SHOULDER PAIN: Primary | ICD-10-CM

## 2019-02-05 DIAGNOSIS — S32.9XXD CLOSED NONDISPLACED FRACTURE OF PELVIS WITH ROUTINE HEALING, UNSPECIFIED PART OF PELVIS, SUBSEQUENT ENCOUNTER: ICD-10-CM

## 2019-02-05 DIAGNOSIS — Z96.642 PRESENCE OF LEFT ARTIFICIAL HIP JOINT: ICD-10-CM

## 2019-02-05 DIAGNOSIS — M75.41 IMPINGEMENT SYNDROME OF RIGHT SHOULDER: ICD-10-CM

## 2019-02-05 DIAGNOSIS — M25.511 CHRONIC RIGHT SHOULDER PAIN: Primary | ICD-10-CM

## 2019-02-05 PROCEDURE — 97110 THERAPEUTIC EXERCISES: CPT

## 2019-02-05 PROCEDURE — 97112 NEUROMUSCULAR REEDUCATION: CPT

## 2019-02-05 PROCEDURE — 97140 MANUAL THERAPY 1/> REGIONS: CPT

## 2019-02-07 ENCOUNTER — OFFICE VISIT (OUTPATIENT)
Dept: PHYSICAL THERAPY | Facility: CLINIC | Age: 64
End: 2019-02-07
Payer: COMMERCIAL

## 2019-02-07 DIAGNOSIS — M25.511 CHRONIC RIGHT SHOULDER PAIN: Primary | ICD-10-CM

## 2019-02-07 DIAGNOSIS — M75.41 IMPINGEMENT SYNDROME OF RIGHT SHOULDER: ICD-10-CM

## 2019-02-07 DIAGNOSIS — S32.9XXD CLOSED NONDISPLACED FRACTURE OF PELVIS WITH ROUTINE HEALING, UNSPECIFIED PART OF PELVIS, SUBSEQUENT ENCOUNTER: ICD-10-CM

## 2019-02-07 DIAGNOSIS — G89.29 CHRONIC RIGHT SHOULDER PAIN: Primary | ICD-10-CM

## 2019-02-07 PROCEDURE — 97112 NEUROMUSCULAR REEDUCATION: CPT

## 2019-02-07 PROCEDURE — 97110 THERAPEUTIC EXERCISES: CPT

## 2019-02-07 NOTE — PROGRESS NOTES
Daily Note     Today's date: 2019  Patient name: Blake Llamas  : 1955  MRN: [id number]  Referring provider: Tera Felix, *  Dx:   Encounter Diagnosis     ICD-10-CM    1  Chronic right shoulder pain M25 511     G89 29    2  Impingement syndrome of right shoulder M75 41    3  Closed nondisplaced fracture of pelvis with routine healing, unspecified part of pelvis, subsequent encounter S32  9XXD        Start Time: 7232  Stop Time: 1530  Total time in clinic (min): 45 minutes    Subjective: Patient noted she was sore after last session 2* introducing new exercises  Objective: See treatment diary below      Precautions: Pelvis fx on 8/15/18, osteoporosis, L JOVANI, L TKR    Daily Treatment Diary     Manual     R shoulder PROM  nv np MW MW MW np MW 10 mins 15 mins  +post cap   R shoulder posterior mobs and distraction  nv np MW MW MW np MW NP NP   Re-eval 20 mins                             Exercise Diary     UBE retro nv 5'  5' 5'/ UBE back 5' UBE  5'  bkwd UBE 5' bkwd UBE  5' bkwd UBE  5'  retro UBE  5'  Retro   sidelying ER  x20           AAROM flexion, abd, ER with cane HEP   x10 abd seated 2x10 ea seated 2x10 ea 2x10 ea nv 2x10 ea  2x10  Ea  Shoulder isometrics: flex, ext, abd, ER, IR HEP   X15, 5" hold ea x20 ea, 5" hold x20 ea  5" hold x20 ea  5" hold x20 ea  5" hold 5"x20 5"x20   Pec stretch & biceps stretch     nv 5x15" ea 5 x 15" ea 5x15" 15"x5 ea  15"x5 ea     Rows and extensions x20  5" hold  GTB x20  5" hold  GTB  x20 ea, 5" hold x20  Ea, 5" ea x20 ea,  5" hold X 30 3" hold x30  3" hold 3"x30 NP   Serratus punches  nv   X20, 3" hold x20  3" hold X 20 3" hold 1# nv 1#  Cane  3"x20 1#  Cane  3"x20   Wall slides       10 x 10" holds flx nv NV NV   No moneys RTB  x20  5" hold nv   x20 ea,  5" hold  RTB x20  5" ea  RTB X 20 5" ea RTB with scap retraction x20  5" hold with scap retraction 5"x20 RTB  5"x20   Shoulder IR/ER YTB  x20 ea YTB  x20 ea           Upper trap and levator scap stretch 3x30" ea nv           Posterior cap stretch np nv        10"x10   HEP: AAROM shoulder flex, abd, ER; scap retraction, bridge, glute set, hip abd (RTB), hip add, shoulder isometrics, biceps stretch, pec stretch, rows/ext, IR/ER, no monies, sidelying ER    Modalities   1/3 1/14 1/17 1/21 1/24 1/28 1/31     HP/CP PRN  10'CP 10' CP post 5' CP post Declined 10' HP post MH post  8'  [address]  Post  10'           Assessment: PT session focused on providing the patient with a strengthening HEP  Patient performed exercises with proper form with 1-2 VCs  Patient noted improvements at the end of the session  Patient would benefit from continued PT to allow the patient to return to her PLOF  Plan: Continue per plan of care

## 2019-02-11 ENCOUNTER — OFFICE VISIT (OUTPATIENT)
Dept: PHYSICAL THERAPY | Facility: CLINIC | Age: 64
End: 2019-02-11
Payer: COMMERCIAL

## 2019-02-11 DIAGNOSIS — G89.29 CHRONIC RIGHT SHOULDER PAIN: Primary | ICD-10-CM

## 2019-02-11 DIAGNOSIS — M25.511 CHRONIC RIGHT SHOULDER PAIN: Primary | ICD-10-CM

## 2019-02-11 DIAGNOSIS — M75.41 IMPINGEMENT SYNDROME OF RIGHT SHOULDER: ICD-10-CM

## 2019-02-11 PROCEDURE — 97112 NEUROMUSCULAR REEDUCATION: CPT | Performed by: PHYSICAL THERAPIST

## 2019-02-11 PROCEDURE — 97110 THERAPEUTIC EXERCISES: CPT | Performed by: PHYSICAL THERAPIST

## 2019-02-11 NOTE — PROGRESS NOTES
Daily Note     Today's date: 2019  Patient name: Humphrey Johnson  : 1955  MRN: [id number]  Referring provider: Tala Muñoz, *  Dx:   Encounter Diagnosis     ICD-10-CM    1  Chronic right shoulder pain M25 511     G89 29    2  Impingement syndrome of right shoulder M75 41                   Subjective: Patient reports that she feels as though her shoulder is improving  Would like to begin to focus on her pelvis  Objective: See treatment diary below    Daily Treatment Diary   Diagnosis: Right shoulder impingement   Precautions: scoliosis- fused TS   pelvis fx on 8/15/18, osteoporosis, L JOVANI, L TKR   Manual Therapy 19       Scapular mobs                                        Exercise Diary         Wall slides flexion 20x        Post capsule stretch 3x30 sec       TB rows/ext YTB 20x        No monies YTB 20x        Scapular squeezes 20x        Alternating scap squeezes 20x        UT stretch [id number]                                                                                                               Modalities                                    Assessment: Tolerated treatment well  Patient demonstrated fatigue post treatment  Patient requires extensive cues to perform scapular exercises  Several exercises were held or d/c'd secondary to being too complex  Will RE pelvis next visit to add exercises  This has not been treated in several visits secondary to patient stating she wanted to focus on the shoulder  Will progress NV  Plan: Continue per plan of care

## 2019-02-14 ENCOUNTER — OFFICE VISIT (OUTPATIENT)
Dept: PHYSICAL THERAPY | Facility: CLINIC | Age: 64
End: 2019-02-14
Payer: COMMERCIAL

## 2019-02-14 DIAGNOSIS — G89.29 CHRONIC RIGHT SHOULDER PAIN: Primary | ICD-10-CM

## 2019-02-14 DIAGNOSIS — M25.511 CHRONIC RIGHT SHOULDER PAIN: Primary | ICD-10-CM

## 2019-02-14 DIAGNOSIS — M75.41 IMPINGEMENT SYNDROME OF RIGHT SHOULDER: ICD-10-CM

## 2019-02-14 PROCEDURE — 97140 MANUAL THERAPY 1/> REGIONS: CPT | Performed by: PHYSICAL THERAPIST

## 2019-02-14 PROCEDURE — 97112 NEUROMUSCULAR REEDUCATION: CPT | Performed by: PHYSICAL THERAPIST

## 2019-02-14 NOTE — PROGRESS NOTES
Daily Note     Today's date: 2019  Patient name: Anne Marie Linn  : 1955  MRN: [id number]  Referring provider: Jannie Kennedy, *  Dx:   Encounter Diagnosis     ICD-10-CM    1  Chronic right shoulder pain M25 511     G89 29    2  Impingement syndrome of right shoulder M75 41                   Subjective: Patient reports that she has soreness in the shoulder today  She states that it has felt better since the last visit  Objective: See treatment diary below    Diagnosis: Right shoulder impingement   Precautions: scoliosis- fused TS   pelvis fx on 8/15/18, osteoporosis, L JOVANI, L TKR   Manual Therapy 19      Scapular mobs  HJS, PT                                      Exercise Diary         Wall slides flexion 20x  20x      Post capsule stretch 3x30 sec [id number]      TB rows/ext YTB 20x  YTB 20x       No monies with scap squeeze YTB 20x  YTB 20x       Scapular squeezes 20x  20x      Alternating scap squeezes 20x  20x      UT stretch [id number]                                                                                                              Modalities        CP PRN  10 mins                          Assessment: Tolerated treatment well  Patient exhibited good technique with therapeutic exercises  Patient was able to perform all exercises today with extensive cues for form  She has difficulty with scapular motion and dissociation  She is improving slowly toward long term goals  Will re-eval the LE and hip NV  Plan: Continue per plan of care

## 2019-02-18 ENCOUNTER — TRANSCRIBE ORDERS (OUTPATIENT)
Dept: PHYSICAL THERAPY | Facility: CLINIC | Age: 64
End: 2019-02-18

## 2019-02-18 ENCOUNTER — EVALUATION (OUTPATIENT)
Dept: PHYSICAL THERAPY | Facility: CLINIC | Age: 64
End: 2019-02-18
Payer: COMMERCIAL

## 2019-02-18 DIAGNOSIS — S32.9XXD CLOSED NONDISPLACED FRACTURE OF PELVIS WITH ROUTINE HEALING, UNSPECIFIED PART OF PELVIS, SUBSEQUENT ENCOUNTER: Primary | ICD-10-CM

## 2019-02-18 PROCEDURE — 97112 NEUROMUSCULAR REEDUCATION: CPT | Performed by: PHYSICAL THERAPIST

## 2019-02-18 PROCEDURE — G8990 OTHER PT/OT CURRENT STATUS: HCPCS | Performed by: PHYSICAL THERAPIST

## 2019-02-18 PROCEDURE — G8991 OTHER PT/OT GOAL STATUS: HCPCS | Performed by: PHYSICAL THERAPIST

## 2019-02-18 NOTE — PROGRESS NOTES
PT Re-Evaluation     Today's date: 2019  Patient name: Hakan Niño  : 1955  MRN: [id number]  Referring provider: Nani Sahu, *  Dx:   Encounter Diagnosis     ICD-10-CM    1  Closed nondisplaced fracture of pelvis with routine healing, unspecified part of pelvis, subsequent encounter S32  9XXD                   Assessment  Assessment details: Patient notes that her gait and balance deficits are affecting her shoulder  She would like to add the LE's back to the work out program at this time  Hakan Niño has been compliant with attending PT and home exercise program since initial eval   Mayco Ramachandran  has made improvements in objective data since initial eval but continues to have significant limitations compared to prior level of function  Mayco Ramachandran continues to have deficits in the above listed impairments and would benefit from additional skilled PT to address these deficits to return to prior level of function  Impairments: abnormal gait, abnormal or restricted ROM, activity intolerance, impaired balance, impaired physical strength, lacks appropriate home exercise program, pain with function, safety issue, weight-bearing intolerance, poor posture  and poor body mechanics    Symptom irritability: moderateUnderstanding of Dx/Px/POC: good   Prognosis: good    Goals  STG: In four weeks the patient will:    1  Be (I) with her HEP  (50% MET)  2  Increase R shoulder strength to 4/5 MMT score to assist c reaching and ADLs  (50% MET)  3  Increase R shoulder flexion and abduction AROM to 170 degrees to assist c ADLs  (50% MET)  4  Increase LE strength to functional to assist c stairs and amb  (not met)  5  Perform tandem stance for 30 seconds with eyes open on a firm surface with minimal lateral sway  (MET)      LTG: In six weeks, the patient will:    1  Increase FOTO score to 39 on LE and 59 on Shoulder to demonstrate improvements in symptoms and function  (shoulder MET, LE 75% MET)  2  Demonstrate full R shoulder AROM without pain  (50% MET)  3  Perform 30 mins of activity without pain  (50% MET)  4  Increase LE strength to functional to be able to ambulate with bilateral single point canes  5  Perform the TUG in <26 seconds to demonstrate less of a fall risk  (MET)  6  Perform tandem stance for 30 seconds with eyes open on a non- compliant surface with minimal lateral sway  (75% MET)    Plan  Patient would benefit from: skilled physical therapy  Referral necessary: No  Planned modality interventions: cryotherapy and thermotherapy: hydrocollator packs  Planned therapy interventions: abdominal trunk stabilization, joint mobilization, manual therapy, massage, Salazar taping, balance, balance/weight bearing training, gait training, home exercise program, therapeutic exercise, therapeutic activities, stretching, strengthening, postural training, patient education and neuromuscular re-education  Frequency: 2x week  Duration in visits: 12  Duration in weeks: 6  Plan of Care beginning date: 2019  Plan of Care expiration date: 3/19/2019  Treatment plan discussed with: patient        Subjective Evaluation    History of Present Illness  Date of onset: 8/15/2018  Mechanism of injury: From RE 19:    Patient noted she feels 50% back to her PLOF in the R shoulder  Patient noted she does not have as much pain and is able to move it more  Patient is able to perform a majority of ADLs with minimal pain, however at night she has moderate pain 2* fatigue  Patient continues to have difficulty with lifting a gallon of milk and achy feelings with knitting  Patient noted her balance and gait is similar to eval 2* not working on it  PT and patient agreed to work on the shoulder then the gait/balance in the future             Recurrent probem    Quality of life: good    Pain  Current pain ratin (R shoulder)  At best pain ratin (R shoulder, after home PT)  At worst pain ratin (R shoulder)  Location: R post capsule and biceps  Quality: dull ache (No numbness or tingling in R UE)  Relieving factors: ice and medications  Aggravating factors: standing, walking and stair climbing  Progression: worsening (R shoulder pain)    Social Support  Exterior steps/ramp assessed: 3 JJ  Lives in: [facility]  Lives with: spouse    Employment status: not working (Retired)  Hand dominance: right  Exercise history: none    Treatments  Previous treatment: home therapy (After fall in August)  Patient Goals  Patient goals for therapy: decreased pain, improved balance, increased motion, increased strength, independence with ADLs/IADLs and return to sport/leisure activities  Patient goal: "decreasing pain and increasing strength "  Increase strength to improve getting around with her LE         HOME LIFE:  She lives with her   She typically is responsible for cooking, cleaning, vacuuming, laundry  She can't carry the groceries in by herself  Has to have her  do this  HOBBIES/EXERCISE: knitting  PAIN LOCATION/DESCRIPTORS:  She has no pain in the LE, but notes a lot of weakness  AGGRAVATING FACTORS:  Walking with canes when out and about  In the house, she would walk without an assistive device  IMAGING:  Previous fracture in the pelvic region  PLOF:  Ambulating with bilateral SPC  HISTORY OF CURRENT INJURY:    Patient noted in August 15, 2018 she fell on her bottom when amb with bilateral SPCs  She reports that her PLOF is using bilateral SPCs  She reports that she has always had some gait and balance issues  After x-rays were taken, she had a 2 fxs in her pelvis  Patient noted no surgery  She also noted R shoulder pain after the fall  Patient participated in home PT for the pelvis and shoulder and thinks that she needs more PT  Patient notes increased difficulty with amb with 2 SPC  Patient amb with a RW when in unfamiliar places   Patient is able to ascend and descend the stairs only with a radhailing  Patient had a 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  Patient had an cortisone injection to the R shoulder about 6 months ago and noted some relief  Recurrent probem      Shoulder measurements not evaluated this visit  Just LE  Objective     Postural Observations  Seated posture: fair  Standing posture: fair        Palpation     Right   Tenderness of the biceps, infraspinatus and supraspinatus       Neurological Testing     Sensation     Shoulder   Left Shoulder   Intact: light touch    Right Shoulder   Intact: light touch    Hip   Left Hip   Intact: light touch    Right Hip   Intact: light touch    Active Range of Motion   Left Shoulder   Normal active range of motion    Right Shoulder   Flexion: 152 degrees   Abduction: 175 degrees   External rotation BTH: C2   Internal rotation BTB: L4     Passive Range of Motion     Right Shoulder   Flexion: 160 degrees   Abduction: 175 degrees     Strength/Myotome Testing     Left Shoulder     Planes of Motion   Flexion: 4+   Abduction: 4+   External rotation at 0°: 4   Internal rotation at 0°: 4     Isolated Muscles   Biceps: 4   Triceps: 4     Right Shoulder     Planes of Motion   Flexion: 3+ (Pain)   Abduction: 3+ (Pain)   External rotation at 0°: 3+   Internal rotation at 0°: 4     Isolated Muscles   Biceps: 4   Triceps: 4     Left Hip   Planes of Motion   Flexion: 3  Extension: 3- (Difficulty with full bridge)  Abduction: 2+ (unable to lift)  Adduction: 3 (Tested in sitting)    Right Hip   Planes of Motion   Flexion: 2+  Extension: 3- (Diffiuclty with full bridge)  Abduction: 2+ (unable to lift)  Adduction: 3 (Tested in sitting)    Left Knee   Flexion: 3 (Tested in sitting)  Extension: 4-    Right Knee   Flexion: 3 (Tested in sitting)  Extension: 4-    Left Ankle/Foot   Dorsiflexion: 4-  Plantar flexion: 3+    Right Ankle/Foot   Dorsiflexion: 4-  Plantar flexion: 3+    Tests     Right Shoulder   Positive drop arm, empty can, full can and Neer's  Negative belly press and lift-off  Ambulation     Observational Gait   Gait: asymmetric   Decreased walking speed and stride length  Additional Observational Gait Details  Valgus at L knee    Comments   TU seconds with RW    FTEO: 30 seconds  FTEC: 30 seconds minimal sway  Tandem: L 10 seconds, R 10 seconds        Diagnosis: Right shoulder impingement   Precautions: scoliosis- fused TS   pelvis fx on 8/15/18, osteoporosis, L JOVANI, L TKR   Manual Therapy 19     Scapular mobs  HJS, PT Hold all treatment, RE on LE                                       Exercise Diary         Wall slides flexion 20x  20x      Post capsule stretch 3x30 sec [id number]      TB rows/ext YTB 20x  YTB 20x       No monies with scap squeeze YTB 20x  YTB 20x       Scapular squeezes 20x  20x      Alternating scap squeezes 20x  20x      UT stretch [id number]                              Clams        Bridges        QS        Mini squats        Tandem balance        SLB        FTEC balance                                Modalities        CP PRN  10 mins

## 2019-02-18 NOTE — LETTER
2019    [address] [place] [address]    Patient: Juan Meneses   YOB: 1955   Date of Visit: 2019     Encounter Diagnosis     ICD-10-CM    1  Closed nondisplaced fracture of pelvis with routine healing, unspecified part of pelvis, subsequent encounter S32  9XXD        Dear Dr Olivas Eis:    Please review the attached Plan of Care from [facility] recent visit  Please verify that you agree therapy should continue by signing the attached document and sending it back to our office  If you have any questions or concerns, please don't hesitate to call  Sincerely,    Ugo Berrios, PT      Referring Provider:      I certify that I have read the below Plan of Care and certify the need for these services furnished under this plan of treatment while under my care  Brian Saucedo MD  [address]: [phone]          PT Re-Evaluation     Today's date: 2019  Patient name: Juan Meneses  : 1955  MRN: [id number]  Referring provider: Eusebio Hutton, *  Dx:   Encounter Diagnosis     ICD-10-CM    1  Closed nondisplaced fracture of pelvis with routine healing, unspecified part of pelvis, subsequent encounter S32  9XXD                   Assessment  Assessment details: Patient notes that her gait and balance deficits are affecting her shoulder  She would like to add the LE's back to the work out program at this time  Juan Meneses has been compliant with attending PT and home exercise program since initial eval   Denita Holman  has made improvements in objective data since initial eval but continues to have significant limitations compared to prior level of function  Denita Holman continues to have deficits in the above listed impairments and would benefit from additional skilled PT to address these deficits to return to prior level of function      Impairments: abnormal gait, abnormal or restricted ROM, activity intolerance, impaired balance, impaired physical strength, lacks appropriate home exercise program, pain with function, safety issue, weight-bearing intolerance, poor posture  and poor body mechanics    Symptom irritability: moderateUnderstanding of Dx/Px/POC: good   Prognosis: good    Goals  STG: In four weeks the patient will:    1  Be (I) with her HEP  (50% MET)  2  Increase R shoulder strength to 4/5 MMT score to assist c reaching and ADLs  (50% MET)  3  Increase R shoulder flexion and abduction AROM to 170 degrees to assist c ADLs  (50% MET)  4  Increase LE strength to functional to assist c stairs and amb  (not met)  5  Perform tandem stance for 30 seconds with eyes open on a firm surface with minimal lateral sway  (MET)      LTG: In six weeks, the patient will:    1  Increase FOTO score to 39 on LE and 59 on Shoulder to demonstrate improvements in symptoms and function  (shoulder MET, LE 75% MET)  2  Demonstrate full R shoulder AROM without pain  (50% MET)  3  Perform 30 mins of activity without pain  (50% MET)  4  Increase LE strength to functional to be able to ambulate with bilateral single point canes  5  Perform the TUG in <26 seconds to demonstrate less of a fall risk  (MET)  6  Perform tandem stance for 30 seconds with eyes open on a non- compliant surface with minimal lateral sway   (75% MET)    Plan  Patient would benefit from: skilled physical therapy  Referral necessary: No  Planned modality interventions: cryotherapy and thermotherapy: hydrocollator packs  Planned therapy interventions: abdominal trunk stabilization, joint mobilization, manual therapy, massage, Salazar taping, balance, balance/weight bearing training, gait training, home exercise program, therapeutic exercise, therapeutic activities, stretching, strengthening, postural training, patient education and neuromuscular re-education  Frequency: 2x week  Duration in visits: 12  Duration in weeks: 6  Plan of Care beginning date: 2019  Plan of Care expiration date: 3/19/2019  Treatment plan discussed with: patient        Subjective Evaluation    History of Present Illness  Date of onset: 8/15/2018  Mechanism of injury: From RE 19:    Patient noted she feels 50% back to her PLOF in the R shoulder  Patient noted she does not have as much pain and is able to move it more  Patient is able to perform a majority of ADLs with minimal pain, however at night she has moderate pain 2* fatigue  Patient continues to have difficulty with lifting a gallon of milk and achy feelings with knitting  Patient noted her balance and gait is similar to eval 2* not working on it  PT and patient agreed to work on the shoulder then the gait/balance in the future  Recurrent probem    Quality of life: good    Pain  Current pain ratin (R shoulder)  At best pain ratin (R shoulder, after home PT)  At worst pain ratin (R shoulder)  Location: R post capsule and biceps  Quality: dull ache (No numbness or tingling in R UE)  Relieving factors: ice and medications  Aggravating factors: standing, walking and stair climbing  Progression: worsening (R shoulder pain)    Social Support  Exterior steps/ramp assessed: 3 JJ  Lives in: [facility]  Lives with: spouse    Employment status: not working (Retired)  Hand dominance: right  Exercise history: none    Treatments  Previous treatment: home therapy (After fall in August)  Patient Goals  Patient goals for therapy: decreased pain, improved balance, increased motion, increased strength, independence with ADLs/IADLs and return to sport/leisure activities  Patient goal: "decreasing pain and increasing strength "  Increase strength to improve getting around with her LE         HOME LIFE:  She lives with her   She typically is responsible for cooking, cleaning, vacuuming, laundry  She can't carry the groceries in by herself    Has to have her  do this      HOBBIES/EXERCISE: knitting  PAIN LOCATION/DESCRIPTORS:  She has no pain in the LE, but notes a lot of weakness  AGGRAVATING FACTORS:  Walking with canes when out and about  In the house, she would walk without an assistive device  IMAGING:  Previous fracture in the pelvic region  PLOF:  Ambulating with bilateral SPC  HISTORY OF CURRENT INJURY:    Patient noted in August 15, 2018 she fell on her bottom when amb with bilateral SPCs  She reports that her PLOF is using bilateral SPCs  She reports that she has always had some gait and balance issues  After x-rays were taken, she had a 2 fxs in her pelvis  Patient noted no surgery  She also noted R shoulder pain after the fall  Patient participated in home PT for the pelvis and shoulder and thinks that she needs more PT  Patient notes increased difficulty with amb with 2 SPC  Patient amb with a RW when in unfamiliar places  Patient is able to ascend and descend the stairs only with a railing  Patient had a 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  Patient had an cortisone injection to the R shoulder about 6 months ago and noted some relief  Recurrent probem      Shoulder measurements not evaluated this visit  Just LE  Objective     Postural Observations  Seated posture: fair  Standing posture: fair        Palpation     Right   Tenderness of the biceps, infraspinatus and supraspinatus       Neurological Testing     Sensation     Shoulder   Left Shoulder   Intact: light touch    Right Shoulder   Intact: light touch    Hip   Left Hip   Intact: light touch    Right Hip   Intact: light touch    Active Range of Motion   Left Shoulder   Normal active range of motion    Right Shoulder   Flexion: 152 degrees   Abduction: 175 degrees   External rotation BTH: C2   Internal rotation BTB: L4     Passive Range of Motion     Right Shoulder   Flexion: 160 degrees   Abduction: 175 degrees     Strength/Myotome Testing     Left Shoulder     Planes of Motion   Flexion: 4+   Abduction: 4+   External rotation at 0°:  4   Internal rotation at 0°:  4     Isolated Muscles   Biceps: 4   Triceps: 4     Right Shoulder     Planes of Motion   Flexion: 3+ (Pain)   Abduction: 3+ (Pain)   External rotation at 0°:  3+   Internal rotation at 0°:  4     Isolated Muscles   Biceps: 4   Triceps: 4     Left Hip   Planes of Motion   Flexion: 3  Extension: 3- (Difficulty with full bridge)  Abduction: 2+ (unable to lift)  Adduction: 3 (Tested in sitting)    Right Hip   Planes of Motion   Flexion: 2+  Extension: 3- (Diffiuclty with full bridge)  Abduction: 2+ (unable to lift)  Adduction: 3 (Tested in sitting)    Left Knee   Flexion: 3 (Tested in sitting)  Extension: 4-    Right Knee   Flexion: 3 (Tested in sitting)  Extension: 4-    Left Ankle/Foot   Dorsiflexion: 4-  Plantar flexion: 3+    Right Ankle/Foot   Dorsiflexion: 4-  Plantar flexion: 3+    Tests     Right Shoulder   Positive drop arm, empty can, full can and Neer's  Negative belly press and lift-off  Ambulation     Observational Gait   Gait: asymmetric   Decreased walking speed and stride length  Additional Observational Gait Details  Valgus at L knee    Comments   TU seconds with RW    FTEO: 30 seconds  FTEC: 30 seconds minimal sway  Tandem: L 10 seconds, R 10 seconds        Diagnosis: Right shoulder impingement   Precautions: scoliosis- fused TS   pelvis fx on 8/15/18, osteoporosis, L JOVANI, L TKR   Manual Therapy 19     Scapular mobs  HJS, PT Hold all treatment, RE on LE                                       Exercise Diary         Wall slides flexion 20x  20x      Post capsule stretch 3x30 sec [id number]      TB rows/ext YTB 20x  YTB 20x       No monies with scap squeeze YTB 20x  YTB 20x       Scapular squeezes 20x  20x      Alternating scap squeezes 20x  20x      UT stretch 4b59zpx 0t14mnd                              Clams Bridges        QS        Mini squats        Tandem balance        SLB        FTEC balance                                Modalities        CP PRN  10 mins

## 2019-02-21 ENCOUNTER — OFFICE VISIT (OUTPATIENT)
Dept: PHYSICAL THERAPY | Facility: CLINIC | Age: 64
End: 2019-02-21
Payer: COMMERCIAL

## 2019-02-21 DIAGNOSIS — S32.9XXD CLOSED NONDISPLACED FRACTURE OF PELVIS WITH ROUTINE HEALING, UNSPECIFIED PART OF PELVIS, SUBSEQUENT ENCOUNTER: ICD-10-CM

## 2019-02-21 DIAGNOSIS — Z96.642 PRESENCE OF LEFT ARTIFICIAL HIP JOINT: Primary | ICD-10-CM

## 2019-02-21 PROCEDURE — 97112 NEUROMUSCULAR REEDUCATION: CPT | Performed by: PHYSICAL THERAPIST

## 2019-02-21 PROCEDURE — 97110 THERAPEUTIC EXERCISES: CPT | Performed by: PHYSICAL THERAPIST

## 2019-02-21 NOTE — PROGRESS NOTES
Daily Note     Today's date: 2019  Patient name: Humphrey Johnson  : 1955  MRN: [id number]  Referring provider: Tala Muñoz, *  Dx:   Encounter Diagnosis     ICD-10-CM    1  Presence of left artificial hip joint Z96 642    2  Closed nondisplaced fracture of pelvis with routine healing, unspecified part of pelvis, subsequent encounter S32  9XXD                   Subjective: Patient presents today and notes more difficulty in walking  She wishes to focus treatment on the LE's today  Objective: See treatment diary below    Diagnosis: Right shoulder impingement   Precautions: scoliosis- fused TS   pelvis fx on 8/15/18, osteoporosis, L JOVANI, L TKR   Manual Therapy 19     Scapular mobs  HJS, PT Hold all treatment, RE on LE  Exercise Diary         Wall slides flexion 20x  20x      Post capsule stretch 3x30 sec [id number]      TB rows/ext YTB 20x  YTB 20x       No monies with scap squeeze YTB 20x  YTB 20x       Scapular squeezes 20x  20x      Alternating scap squeezes 20x  20x      UT stretch 9a53wnk 6f29fgy                              Hooklying hip abduction    YTB 2x10    Bridges    2x10    QS    5 sec holds 2x10    Mini squats    15x     Tandem balance    [id number]    SLB        FT balance    [id number]                            Modalities        CP PRN  10 mins                        Assessment: Tolerated treatment well  Patient exhibited good technique with therapeutic exercises  Patient was able to perform all exercises today with minimal pain, but great difficulty  She has difficulty actively moving bilateral lower extremities  She was able to perform muscle activation exercises without increased pain  She requires extensive upper extremity use for bed mobility  Plan: Continue per plan of care

## 2019-02-25 ENCOUNTER — OFFICE VISIT (OUTPATIENT)
Dept: PHYSICAL THERAPY | Facility: CLINIC | Age: 64
End: 2019-02-25
Payer: COMMERCIAL

## 2019-02-25 DIAGNOSIS — Z96.642 PRESENCE OF LEFT ARTIFICIAL HIP JOINT: Primary | ICD-10-CM

## 2019-02-25 DIAGNOSIS — M25.511 CHRONIC RIGHT SHOULDER PAIN: ICD-10-CM

## 2019-02-25 DIAGNOSIS — G89.29 CHRONIC RIGHT SHOULDER PAIN: ICD-10-CM

## 2019-02-25 DIAGNOSIS — S32.9XXD CLOSED NONDISPLACED FRACTURE OF PELVIS WITH ROUTINE HEALING, UNSPECIFIED PART OF PELVIS, SUBSEQUENT ENCOUNTER: ICD-10-CM

## 2019-02-25 DIAGNOSIS — M75.41 IMPINGEMENT SYNDROME OF RIGHT SHOULDER: ICD-10-CM

## 2019-02-25 PROCEDURE — 97112 NEUROMUSCULAR REEDUCATION: CPT | Performed by: PHYSICAL THERAPIST

## 2019-02-25 PROCEDURE — 97110 THERAPEUTIC EXERCISES: CPT | Performed by: PHYSICAL THERAPIST

## 2019-02-25 NOTE — PROGRESS NOTES
Daily Note     Today's date: 2019  Patient name: Mariam Torrez  : 1955  MRN: [id number]  Referring provider: Edelmira Kennedy, *  Dx:   Encounter Diagnosis     ICD-10-CM    1  Presence of left artificial hip joint Z96 642    2  Closed nondisplaced fracture of pelvis with routine healing, unspecified part of pelvis, subsequent encounter S32  9XXD    3  Chronic right shoulder pain M25 511     G89 29    4  Impingement syndrome of right shoulder M75 41                   Subjective: Patient reports she continues to have difficulty with getting up from a chair, and reaching overhead/stirring while cooking  Objective: See treatment diary below    Diagnosis: Right shoulder impingement   Precautions: scoliosis- fused TS   pelvis fx on 8/15/18, osteoporosis, L JOVANI, L TKR   Manual Therapy 19   Scapular mobs  HJS, PT Hold all treatment, RE on LE  Exercise Diary         Wall slides flexion 20x  20x   20x   Post capsule stretch 3x30 sec 4u06mca   7n59kye   TB rows/ext YTB 20x  YTB 20x    YTB 20x standing   No monies with scap squeeze YTB 20x  YTB 20x    YTB 20x seated   Scapular squeezes 20x  20x   20x   Alternating scap squeezes 20x  20x   20x   UT stretch 3o85bje 8u72hyl   3x30 sec                           Hooklying hip abduction    YTB 2x10 YTB 2x10   Bridges    2x10 2x10   QS    5 sec holds 2x10 5 sce holds 2x10    Mini squats 15x    15x  15x   Tandem balance 10x10 sec ea   86t56xji 05y14nhi   SLB     7u99eft ea   FT balance     5q95nxk   Standing marches     20x ea   LAQ     2x10 ea   Seated marches     2x10 ea   Pt education     HEP updated  All patient's questions answered  Modalities        CP PRN  10 mins                          Assessment: Tolerated treatment well  Patient demonstrated fatigue post treatment  Patient was able to perform all exercises noted, but with significant difficulty for the LE's    She has increased difficulty with hip flexion AROM  She is improving slowly toward long term goals  Plan: Continue per plan of care

## 2019-02-28 ENCOUNTER — OFFICE VISIT (OUTPATIENT)
Dept: PHYSICAL THERAPY | Facility: CLINIC | Age: 64
End: 2019-02-28
Payer: COMMERCIAL

## 2019-02-28 DIAGNOSIS — M75.41 IMPINGEMENT SYNDROME OF RIGHT SHOULDER: ICD-10-CM

## 2019-02-28 DIAGNOSIS — Z96.642 PRESENCE OF LEFT ARTIFICIAL HIP JOINT: Primary | ICD-10-CM

## 2019-02-28 DIAGNOSIS — G89.29 CHRONIC RIGHT SHOULDER PAIN: ICD-10-CM

## 2019-02-28 DIAGNOSIS — M25.511 CHRONIC RIGHT SHOULDER PAIN: ICD-10-CM

## 2019-02-28 DIAGNOSIS — S32.9XXD CLOSED NONDISPLACED FRACTURE OF PELVIS WITH ROUTINE HEALING, UNSPECIFIED PART OF PELVIS, SUBSEQUENT ENCOUNTER: ICD-10-CM

## 2019-02-28 PROCEDURE — 97110 THERAPEUTIC EXERCISES: CPT | Performed by: PHYSICAL THERAPIST

## 2019-02-28 PROCEDURE — 97112 NEUROMUSCULAR REEDUCATION: CPT | Performed by: PHYSICAL THERAPIST

## 2019-02-28 NOTE — PROGRESS NOTES
Daily Note     Today's date: 2019  Patient name: Hakan Niño  : 1955  MRN: [id number]  Referring provider: Nani Sahu, *  Dx:   Encounter Diagnosis     ICD-10-CM    1  Presence of left artificial hip joint Z96 642    2  Closed nondisplaced fracture of pelvis with routine healing, unspecified part of pelvis, subsequent encounter S32  9XXD    3  Chronic right shoulder pain M25 511     G89 29    4  Impingement syndrome of right shoulder M75 41                   Subjective: Patient reports that she has increased shoulder pain and back pain today since she is just coming from knitting  Objective: See treatment diary below    Diagnosis: Right shoulder impingement   Precautions: scoliosis- fused TS   pelvis fx on 8/15/18, osteoporosis, L JOVANI, L TKR   Manual Therapy 19   Scapular mobs   Hold all treatment, RE on LE  Exercise Diary         Wall slides flexion     20x   Post capsule stretch 7u48ptk    0k60lyl   Standing TB rows/ext YTB 20x     YTB 20x standing   Alternating scap squeezes 20x ea    20x   UT stretch 6y69jqw    3x30 sec   No monies with scap squeeze seated YTB 20x                Heel slides AROM 20x ea       Hooklying hip abduction YTB 20x   YTB 2x10 YTB 2x10   Bridges 20x   2x10 2x10   QS 5 sec hold 20x ea   5 sec holds 2x10 5 sce holds 2x10    Mini squats 20x   15x  15x   Tandem balance 98s13dok   60q93tou 29z83noj   SLB 3x15 sec ea    2y43jjc ea   FT balance EC 0d65qpb    5h04duy   Standing marches 2# 20x ea    20x ea   LAQ 20x ea    2x10 ea   Seated marches 20x ea    2x10 ea   Pt education Walking with less weight through UE on walker  HEP updated  All patient's questions answered  Modalities        CP PRN  10 mins                          Assessment: Tolerated treatment well  Patient exhibited good technique with therapeutic exercises    Patient was able to perform all exercises noted today without increased pain  She does continue to have difficulty with LE strengthening tasks  Improving slowly toward long term goals  Plan: Continue per plan of care

## 2019-03-04 ENCOUNTER — OFFICE VISIT (OUTPATIENT)
Dept: PHYSICAL THERAPY | Facility: CLINIC | Age: 64
End: 2019-03-04
Payer: COMMERCIAL

## 2019-03-04 ENCOUNTER — TRANSCRIBE ORDERS (OUTPATIENT)
Dept: PHYSICAL THERAPY | Facility: CLINIC | Age: 64
End: 2019-03-04

## 2019-03-04 DIAGNOSIS — S32.9XXD CLOSED NONDISPLACED FRACTURE OF PELVIS WITH ROUTINE HEALING, UNSPECIFIED PART OF PELVIS, SUBSEQUENT ENCOUNTER: ICD-10-CM

## 2019-03-04 DIAGNOSIS — G89.29 CHRONIC RIGHT SHOULDER PAIN: ICD-10-CM

## 2019-03-04 DIAGNOSIS — S32.591A CLOSED BILATERAL FRACTURE OF PUBIC RAMI, INITIAL ENCOUNTER (HCC): ICD-10-CM

## 2019-03-04 DIAGNOSIS — M25.511 CHRONIC RIGHT SHOULDER PAIN: ICD-10-CM

## 2019-03-04 DIAGNOSIS — M75.41 IMPINGEMENT SYNDROME OF RIGHT SHOULDER: ICD-10-CM

## 2019-03-04 DIAGNOSIS — S32.592A CLOSED BILATERAL FRACTURE OF PUBIC RAMI, INITIAL ENCOUNTER (HCC): ICD-10-CM

## 2019-03-04 DIAGNOSIS — Z96.642 PRESENCE OF LEFT ARTIFICIAL HIP JOINT: Primary | ICD-10-CM

## 2019-03-04 PROCEDURE — G8990 OTHER PT/OT CURRENT STATUS: HCPCS | Performed by: PHYSICAL THERAPIST

## 2019-03-04 PROCEDURE — G8991 OTHER PT/OT GOAL STATUS: HCPCS | Performed by: PHYSICAL THERAPIST

## 2019-03-04 PROCEDURE — 97110 THERAPEUTIC EXERCISES: CPT | Performed by: PHYSICAL THERAPIST

## 2019-03-04 PROCEDURE — 97112 NEUROMUSCULAR REEDUCATION: CPT | Performed by: PHYSICAL THERAPIST

## 2019-03-04 NOTE — PROGRESS NOTES
PT Re-Evaluation     Today's date: 3/4/2019  Patient name: Patricia Kirby  : 1955  MRN: [id number]  Referring provider: Wendy Hartley, *  Dx:   Encounter Diagnosis     ICD-10-CM    1  Presence of left artificial hip joint Z96 642    2  Closed nondisplaced fracture of pelvis with routine healing, unspecified part of pelvis, subsequent encounter S32  9XXD    3  Chronic right shoulder pain M25 511     G89 29    4  Impingement syndrome of right shoulder M75 41                   Assessment  Assessment details: Patricia Kirby has been compliant with attending PT and home exercise program since initial eval   Jessica Zavala  has made slight improvements in objective data since initial eval but continues to have limitations compared to prior level of function in both the right shoulder and bilateral LE's  Jessica Zavala continues to have deficits in the above listed impairments and would benefit from additional skilled PT to address these deficits to return to prior level of function  Impairments: abnormal gait, abnormal or restricted ROM, activity intolerance, impaired balance, impaired physical strength, lacks appropriate home exercise program, pain with function, safety issue, weight-bearing intolerance, poor posture  and poor body mechanics    Symptom irritability: moderateUnderstanding of Dx/Px/POC: good   Prognosis: good    Goals  STG: In four weeks the patient will:    1  Be (I) with her HEP  (50% MET)  2  Increase R shoulder strength to 4/5 MMT score to assist c reaching and ADLs  (50% MET)  3  Increase R shoulder flexion and abduction AROM to 170 degrees to assist c ADLs  (50% MET)  4  Increase LE strength to functional to assist c stairs and amb  (Progressing)  5  Perform tandem stance for 30 seconds with eyes open on a firm surface with minimal lateral sway  (MET)      LTG: In six weeks, the patient will:    1   Increase FOTO score to 39 on LE and 59 on Shoulder to demonstrate improvements in symptoms and function  (shoulder MET, LE 75% MET)  2  Demonstrate full R shoulder AROM without pain  (50% MET)  3  Perform 30 mins of activity without pain  (50% MET)  4  Increase LE strength to functional to be able to ambulate with bilateral single point canes  5  Perform the TUG in <26 seconds to demonstrate less of a fall risk  (MET)  6  Perform tandem stance for 30 seconds with eyes open on a non- compliant surface with minimal lateral sway  (75% MET)    Plan  Patient would benefit from: skilled physical therapy  Referral necessary: No  Planned modality interventions: cryotherapy and thermotherapy: hydrocollator packs  Planned therapy interventions: abdominal trunk stabilization, joint mobilization, manual therapy, massage, Salazar taping, balance, balance/weight bearing training, gait training, home exercise program, therapeutic exercise, therapeutic activities, stretching, strengthening, postural training, patient education and neuromuscular re-education  Frequency: 2x week  Duration in visits: 12  Duration in weeks: 6  Treatment plan discussed with: patient        Subjective Evaluation    History of Present Illness  Date of onset: 8/15/2018          Recurrent probem    Quality of life: good    Pain  Current pain ratin (R shoulder)  At best pain ratin (R shoulder)  At worst pain ratin (R shoulder: 4/10, LE: N/A)  Location: R post capsule and biceps    Patient Goals  Patient goal: "decreasing pain and increasing strength "  Increase strength to improve getting around with her LE  Patient noted she feels 50% back to her PLOF in the R shoulder  Notes that she no longer has pain in the shoulder constantly  Aggs:  Washing under opposite arm, lifting a gallon of milk and achy feelings with knitting  Brushing hair, drying off from the shower  Unable to unhook bra with right arm  Patient notes that she feels about 40% improved since starting PT     She has been using her cane around the house      AGGRAVATING FACTORS:  Walking with canes when out and about  In the house, she would walk without an assistive device  Continues to really struggle with LE AROM  She reports PLOF is walking with bilateral SPCs  Objective     Postural Observations  Seated posture: fair  Standing posture: fair        Palpation     Right   No palpable tenderness to the infraspinatus  Tenderness of the biceps and supraspinatus       Neurological Testing     Sensation     Shoulder   Left Shoulder   Intact: light touch    Right Shoulder   Intact: light touch    Hip   Left Hip   Intact: light touch    Right Hip   Intact: light touch    Active Range of Motion   Left Shoulder   Normal active range of motion    Right Shoulder   Flexion: 150 degrees   Abduction: 135 degrees   External rotation BTH: T4   Internal rotation BTB: T11     Passive Range of Motion     Right Shoulder   Flexion: 138 degrees   Abduction: 175 degrees   External rotation 45°: 70 degrees   Internal rotation 45°: 55 degrees     Strength/Myotome Testing     Left Shoulder     Planes of Motion   Flexion: 4+   Abduction: 4+   External rotation at 0°: 4   Internal rotation at 0°: 4     Isolated Muscles   Biceps: 4   Triceps: 4     Right Shoulder     Planes of Motion   Flexion: 3+ (Pain)   Abduction: 3+ (Pain)   External rotation at 0°: 3+   Internal rotation at 0°: 4     Isolated Muscles   Biceps: 4   Triceps: 4     Left Hip   Planes of Motion   Flexion: 3  Extension: 3- (Difficulty with full bridge)  Abduction: 2+ (unable to lift)  Adduction: 3 (Tested in sitting)    Right Hip   Planes of Motion   Flexion: 2+  Extension: 3- (Diffiuclty with full bridge)  Abduction: 2+ (unable to lift)  Adduction: 3 (Tested in sitting)    Left Knee   Flexion: 3 (Tested in sitting)  Extension: 4-    Right Knee   Flexion: 3 (Tested in sitting)  Extension: 4-    Left Ankle/Foot   Dorsiflexion: 4-  Plantar flexion: 3+    Right Ankle/Foot   Dorsiflexion: 4-  Plantar flexion: 3+    Additional Strength Details  Strength NT on RE on 3/4/19 secondary to pain with AROM and PROM    Tests     Right Shoulder   Positive apprehension, belly press, drop arm, empty can, full can, Neer's and bicep load   Negative Hawkin's and lift-off  Ambulation     Observational Gait   Gait: asymmetric   Decreased walking speed and stride length  Additional Observational Gait Details  Valgus at L knee  Ambulates with Rolling walker  Comments   TU seconds with RW    FTEO: 30 seconds  FTEC: 30 seconds minimal sway  Tandem: L 10 seconds, R 10 seconds        Diagnosis: Right shoulder impingement   Precautions: scoliosis- fused TS   pelvis fx on 8/15/18, osteoporosis, L JOVANI, L TKR   Manual Therapy 2/28/19 3/4/19   2/25/19   Scapular mobs        PROM ER/IR  HJS, PT                              Exercise Diary         Wall slides flexion     20x   Post capsule stretch 2c70ddi Supine: 3z45tqz   3a76wgz   Standing TB rows/ext YTB 20x     YTB 20x standing   Alternating scap squeezes 20x ea    20x   UT stretch 7c64peo 8w38ixb   3x30 sec   No monies with scap squeeze seated YTB 20x  YTB 20x       Sidelying LT activation  20x R                              Sleeper stretch  3x30 sec      Heel slides AROM with strap assist 20x ea 20x       Hooklying hip abduction YTB 20x D/c   YTB 2x10 YTB 2x10   Bridges 20x Scoots on table: R & L: 10x ea  2x10 2x10   QS 5 sec hold 20x ea 5 sec holds 20x   5 sec holds 2x10 5 sce holds 2x10    Mini squats 20x   15x  15x   Tandem balance 62p85jok   93z73qmm 72f62vxm   SLB 3x15 sec ea    6b64bli ea   FT balance EC 6j40enb    7z75trx   Standing marches 2# 20x ea    20x ea   LAQ 20x ea 1# 20x ea   2x10 ea   Seated marches 20x ea 20x ea   2x10 ea   Pt education Walking with less weight through UE on walker  RE- progress   HEP updated  All patient's questions answered     Modalities        CP PRN

## 2019-03-04 NOTE — LETTER
2019    [id number]    Patient: Grace Mcmahan   YOB: 1955   Date of Visit: 3/4/2019     Encounter Diagnosis     ICD-10-CM    1  Presence of left artificial hip joint Z96 642    2  Closed nondisplaced fracture of pelvis with routine healing, unspecified part of pelvis, subsequent encounter S32  9XXD    3  Chronic right shoulder pain M25 511     G89 29    4  Impingement syndrome of right shoulder M75 41    5  Closed bilateral fracture of pubic rami, initial encounter [facility]) J40 350E     A68 234B        Dear Dr Jerad Styles:    Please review the attached Plan of Care from [facility] recent visit  Please verify that you agree therapy should continue by signing the attached document and sending it back to our office  If you have any questions or concerns, please don't hesitate to call  Sincerely,    Ju Wright, PT      Referring Provider:      I certify that I have read the below Plan of Care and certify the need for these services furnished under this plan of treatment while under my care  Roxanne Hector MD  [address] [place] [address]: [phone]          PT Re-Evaluation     Today's date: 3/4/2019  Patient name: Grace Mcmahan  : 1955  MRN: [id number]  Referring provider: Aaron Brar, *  Dx:   Encounter Diagnosis     ICD-10-CM    1  Presence of left artificial hip joint Z96 642    2  Closed nondisplaced fracture of pelvis with routine healing, unspecified part of pelvis, subsequent encounter S32  9XXD    3  Chronic right shoulder pain M25 511     G89 29    4   Impingement syndrome of right shoulder M75 41                   Assessment  Assessment details: Grace Mcmahan has been compliant with attending PT and home exercise program since initial eval   Zee Travis  has made slight improvements in objective data since initial eval but continues to have limitations compared to prior level of function in both the right shoulder and bilateral LE's  Denita Holman continues to have deficits in the above listed impairments and would benefit from additional skilled PT to address these deficits to return to prior level of function  Impairments: abnormal gait, abnormal or restricted ROM, activity intolerance, impaired balance, impaired physical strength, lacks appropriate home exercise program, pain with function, safety issue, weight-bearing intolerance, poor posture  and poor body mechanics    Symptom irritability: moderateUnderstanding of Dx/Px/POC: good   Prognosis: good    Goals  STG: In four weeks the patient will:    1  Be (I) with her HEP  (50% MET)  2  Increase R shoulder strength to 4/5 MMT score to assist c reaching and ADLs  (50% MET)  3  Increase R shoulder flexion and abduction AROM to 170 degrees to assist c ADLs  (50% MET)  4  Increase LE strength to functional to assist c stairs and amb  (Progressing)  5  Perform tandem stance for 30 seconds with eyes open on a firm surface with minimal lateral sway  (MET)      LTG: In six weeks, the patient will:    1  Increase FOTO score to 39 on LE and 59 on Shoulder to demonstrate improvements in symptoms and function  (shoulder MET, LE 75% MET)  2  Demonstrate full R shoulder AROM without pain  (50% MET)  3  Perform 30 mins of activity without pain  (50% MET)  4  Increase LE strength to functional to be able to ambulate with bilateral single point canes  5  Perform the TUG in <26 seconds to demonstrate less of a fall risk  (MET)  6  Perform tandem stance for 30 seconds with eyes open on a non- compliant surface with minimal lateral sway   (75% MET)    Plan  Patient would benefit from: skilled physical therapy  Referral necessary: No  Planned modality interventions: cryotherapy and thermotherapy: hydrocollator packs  Planned therapy interventions: abdominal trunk stabilization, joint mobilization, manual therapy, massage, Salazar taping, balance, balance/weight bearing training, gait training, home exercise program, therapeutic exercise, therapeutic activities, stretching, strengthening, postural training, patient education and neuromuscular re-education  Frequency: 2x week  Duration in visits: 12  Duration in weeks: 6  Treatment plan discussed with: patient        Subjective Evaluation    History of Present Illness  Date of onset: 8/15/2018          Recurrent probem    Quality of life: good    Pain  Current pain ratin (R shoulder)  At best pain ratin (R shoulder)  At worst pain ratin (R shoulder: 4/10, LE: N/A)  Location: R post capsule and biceps    Patient Goals  Patient goal: "decreasing pain and increasing strength "  Increase strength to improve getting around with her LE  Patient noted she feels 50% back to her PLOF in the R shoulder  Notes that she no longer has pain in the shoulder constantly  Aggs:  Washing under opposite arm, lifting a gallon of milk and achy feelings with knitting  Brushing hair, drying off from the shower  Unable to unhook bra with right arm  Patient notes that she feels about 40% improved since starting PT  She has been using her cane around the house  AGGRAVATING FACTORS:  Walking with canes when out and about  In the house, she would walk without an assistive device  Continues to really struggle with LE AROM  She reports PLOF is walking with bilateral SPCs  Objective     Postural Observations  Seated posture: fair  Standing posture: fair        Palpation     Right   No palpable tenderness to the infraspinatus  Tenderness of the biceps and supraspinatus       Neurological Testing     Sensation     Shoulder   Left Shoulder   Intact: light touch    Right Shoulder   Intact: light touch    Hip   Left Hip   Intact: light touch    Right Hip   Intact: light touch    Active Range of Motion   Left Shoulder   Normal active range of motion    Right Shoulder   Flexion: 150 degrees   Abduction: 135 degrees   External rotation BTH: T4   Internal rotation BTB: T11     Passive Range of Motion     Right Shoulder   Flexion: 138 degrees   Abduction: 175 degrees   External rotation 45°:  70 degrees   Internal rotation 45°:  55 degrees     Strength/Myotome Testing     Left Shoulder     Planes of Motion   Flexion: 4+   Abduction: 4+   External rotation at 0°:  4   Internal rotation at 0°:  4     Isolated Muscles   Biceps: 4   Triceps: 4     Right Shoulder     Planes of Motion   Flexion: 3+ (Pain)   Abduction: 3+ (Pain)   External rotation at 0°:  3+   Internal rotation at 0°:  4     Isolated Muscles   Biceps: 4   Triceps: 4     Left Hip   Planes of Motion   Flexion: 3  Extension: 3- (Difficulty with full bridge)  Abduction: 2+ (unable to lift)  Adduction: 3 (Tested in sitting)    Right Hip   Planes of Motion   Flexion: 2+  Extension: 3- (Diffiuclty with full bridge)  Abduction: 2+ (unable to lift)  Adduction: 3 (Tested in sitting)    Left Knee   Flexion: 3 (Tested in sitting)  Extension: 4-    Right Knee   Flexion: 3 (Tested in sitting)  Extension: 4-    Left Ankle/Foot   Dorsiflexion: 4-  Plantar flexion: 3+    Right Ankle/Foot   Dorsiflexion: 4-  Plantar flexion: 3+    Additional Strength Details  Strength NT on RE on 3/4/19 secondary to pain with AROM and PROM    Tests     Right Shoulder   Positive apprehension, belly press, drop arm, empty can, full can, Neer's and bicep load   Negative Hawkin's and lift-off  Ambulation     Observational Gait   Gait: asymmetric   Decreased walking speed and stride length  Additional Observational Gait Details  Valgus at L knee  Ambulates with Rolling walker       Comments   TU seconds with RW    FTEO: 30 seconds  FTEC: 30 seconds minimal sway  Tandem: L 10 seconds, R 10 seconds        Diagnosis: Right shoulder impingement   Precautions: scoliosis- fused TS   pelvis fx on 8/15/18, osteoporosis, L JOVANI, L TKR   Manual Therapy 2/28/19 3/4/19   2/25/19   Scapular mobs        PROM ER/IR  HJS, PT                              Exercise Diary         Wall slides flexion     20x   Post capsule stretch 2w00lor Supine: 6a57qqp   6d30uiv   Standing TB rows/ext YTB 20x     YTB 20x standing   Alternating scap squeezes 20x ea    20x   UT stretch 0s11mbi 2n41lif   3x30 sec   No monies with scap squeeze seated YTB 20x  YTB 20x       Sidelying LT activation  20x R                              Sleeper stretch  3x30 sec      Heel slides AROM with strap assist 20x ea 20x       Hooklying hip abduction YTB 20x D/c   YTB 2x10 YTB 2x10   Bridges 20x Scoots on table: R & L: 10x ea  2x10 2x10   QS 5 sec hold 20x ea 5 sec holds 20x   5 sec holds 2x10 5 sce holds 2x10    Mini squats 20x   15x  15x   Tandem balance 64r87mxp   07k48gvv 65l31uyi   SLB 3x15 sec ea    0d70pwm ea   FT balance EC 3s45wmq    9l02dxt   Standing marches 2# 20x ea    20x ea   LAQ 20x ea 1# 20x ea   2x10 ea   Seated marches 20x ea 20x ea   2x10 ea   Pt education Walking with less weight through UE on walker  RE- progress   HEP updated  All patient's questions answered     Modalities        CP PRN

## 2019-03-07 ENCOUNTER — OFFICE VISIT (OUTPATIENT)
Dept: PHYSICAL THERAPY | Facility: CLINIC | Age: 64
End: 2019-03-07
Payer: COMMERCIAL

## 2019-03-07 DIAGNOSIS — G89.29 CHRONIC RIGHT SHOULDER PAIN: ICD-10-CM

## 2019-03-07 DIAGNOSIS — M75.41 IMPINGEMENT SYNDROME OF RIGHT SHOULDER: ICD-10-CM

## 2019-03-07 DIAGNOSIS — Z96.642 PRESENCE OF LEFT ARTIFICIAL HIP JOINT: Primary | ICD-10-CM

## 2019-03-07 DIAGNOSIS — M25.511 CHRONIC RIGHT SHOULDER PAIN: ICD-10-CM

## 2019-03-07 DIAGNOSIS — S32.9XXD CLOSED NONDISPLACED FRACTURE OF PELVIS WITH ROUTINE HEALING, UNSPECIFIED PART OF PELVIS, SUBSEQUENT ENCOUNTER: ICD-10-CM

## 2019-03-07 PROCEDURE — 97112 NEUROMUSCULAR REEDUCATION: CPT | Performed by: PHYSICAL THERAPIST

## 2019-03-07 PROCEDURE — 97110 THERAPEUTIC EXERCISES: CPT | Performed by: PHYSICAL THERAPIST

## 2019-03-07 NOTE — PROGRESS NOTES
Daily Note     Today's date: 3/7/2019  Patient name: Nery Son  : 1955  MRN: [id number]  Referring provider: Shanda Lindo, *  Dx:   Encounter Diagnosis     ICD-10-CM    1  Presence of left artificial hip joint Z96 642    2  Closed nondisplaced fracture of pelvis with routine healing, unspecified part of pelvis, subsequent encounter S32  9XXD    3  Chronic right shoulder pain M25 511     G89 29    4  Impingement syndrome of right shoulder M75 41                 Patient presents today without an appointment, however, was accommodated  Subjective: States she has been using the cane around the house in the left UE, and has noticed decreased pain          Objective: See treatment diary below    Diagnosis: Right shoulder impingement   Precautions: scoliosis- fused TS   pelvis fx on 8/15/18, osteoporosis, L JOVANI, L TKR   Manual Therapy 2/28/19 3/4/19 3/7/19  2/25/19   Scapular mobs        PROM ER/IR  HJS, PT Hold secondary to time                             Exercise Diary         Wall slides flexion     20x   Post capsule stretch 6x44eyv Supine: 4r06ioq 2d79kbp  2g30ncy   Standing TB rows/ext YTB 20x     YTB 20x standing   Alternating scap squeezes 20x ea  20x ea  20x   UT stretch 1v70avc 8n93wfp 6g96lyh  3x30 sec   No monies with scap squeeze seated YTB 20x  YTB 20x       Sidelying LT activation  20x R 20x R                             Sleeper stretch  3x30 sec 6j66bka     Heel slides AROM with strap assist 20x ea 20x       Hooklying hip abduction YTB 20x D/c    YTB 2x10   Bridges 20x Scoots on table: R & L: 10x ea 10x ea  2x10   QS 5 sec hold 20x ea 5 sec holds 20x    5 sce holds 2x10    Mini squats 20x  20x  15x   Tandem balance 25w57hzl  52f04bjw  74r58icd   SLB 3x15 sec ea  3x15 sec ea  5n14ihr ea   FT balance EC 0u65nhh  3x30 sec  0y61ijy   Standing marches 2# 20x ea  2# 20x   20x ea   LAQ 20x ea 1# 20x ea 1# 20x ea  2x10 ea   Seated marches 20x ea 20x ea 20xea  2x10 ea   Pt education Walking with less weight through UE on walker  RE- progress Cane ambulation 2 times around Time Calvin updated  All patient's questions answered  Modalities        CP PRN                            Assessment: Tolerated treatment well  Patient demonstrated fatigue post treatment  Patient is progressing slowly toward long term goals  Patient was taken through exercises today and continues to require cues to perform without compensations  She continues to have significant difficulty lifting legs for seated marches, and bed mobility  Plan: Continue per plan of care

## 2019-03-11 ENCOUNTER — OFFICE VISIT (OUTPATIENT)
Dept: PHYSICAL THERAPY | Facility: CLINIC | Age: 64
End: 2019-03-11
Payer: COMMERCIAL

## 2019-03-11 DIAGNOSIS — M75.41 IMPINGEMENT SYNDROME OF RIGHT SHOULDER: ICD-10-CM

## 2019-03-11 DIAGNOSIS — Z96.642 PRESENCE OF LEFT ARTIFICIAL HIP JOINT: Primary | ICD-10-CM

## 2019-03-11 DIAGNOSIS — M25.511 CHRONIC RIGHT SHOULDER PAIN: ICD-10-CM

## 2019-03-11 DIAGNOSIS — S32.9XXD CLOSED NONDISPLACED FRACTURE OF PELVIS WITH ROUTINE HEALING, UNSPECIFIED PART OF PELVIS, SUBSEQUENT ENCOUNTER: ICD-10-CM

## 2019-03-11 DIAGNOSIS — G89.29 CHRONIC RIGHT SHOULDER PAIN: ICD-10-CM

## 2019-03-11 PROCEDURE — 97112 NEUROMUSCULAR REEDUCATION: CPT | Performed by: PHYSICAL THERAPIST

## 2019-03-11 PROCEDURE — 97110 THERAPEUTIC EXERCISES: CPT | Performed by: PHYSICAL THERAPIST

## 2019-03-11 NOTE — PROGRESS NOTES
Daily Note     Today's date: 3/11/2019  Patient name: Nery Son  : 1955  MRN: [id number]  Referring provider: Shanda Lindo, *  Dx:   Encounter Diagnosis     ICD-10-CM    1  Presence of left artificial hip joint Z96 642    2  Closed nondisplaced fracture of pelvis with routine healing, unspecified part of pelvis, subsequent encounter S32  9XXD    3  Chronic right shoulder pain M25 511     G89 29    4  Impingement syndrome of right shoulder M75 41                   Subjective: Patient reports that she feels ok  She reports her LE seem to be getting much stronger  She states that her shoulder continues to be an issue  Objective: See treatment diary below    Diagnosis: Right shoulder impingement   Precautions: scoliosis- fused TS   pelvis fx on 8/15/18, osteoporosis, L JOVANI, L TKR   Manual Therapy 2/28/19 3/4/19 3/7/19 3/11/19    Scapular mobs        PROM ER/IR  HJS, PT Hold secondary to time                             Exercise Diary         Wall slides flexion    10x ea    Post capsule stretch 0o20ofm Supine: 0g35ydt 4s13xli 3p39prg    Standing TB rows/ext YTB 20x    RTB 20x    Alternating scap squeezes 20x ea  20x ea D/c     UT stretch 4o15teb 9u55fts 8l77vvv 6r80lqo    No monies with scap squeeze seated YTB 20x  YTB 20x   RTB 20X     Sidelying LT activation  20x R 20x R 20x    Horizontal T's    NV                    Sleeper stretch  3x30 sec 3o68oos Strap stretch: 8o84gep    Heel slides AROM with strap assist 20x ea 20x   20x, strap use for stretch only  AROM for first 50%        Hooklying hip abduction YTB 20x D/c   -    Bridges 20x Scoots on table: R & L: 10x ea 10x ea Bridges: 20x     QS 5 sec hold 20x ea 5 sec holds 20x   20x 5 sec holds    Mini squats 20x  20x 20x    Tandem balance 36l27dig  23r21iso 61f43kdh    SLB 3x15 sec ea  3x15 sec ea 9j00kzk ea    FT balance EC 2v01pwj  3x30 sec 6r19zfd    Standing marches 2# 20x ea  2# 20x  2# 20x    LAQ 20x ea 1# 20x ea 1# 20x ea 2# 20x ea    Seated marches 20x ea 20x ea 20xea 20x ea    Pt education Walking with less weight through UE on walker  RE- progress Cane ambulation 2 times around [address] From table <> wall slides    Modalities        CP PRN                            Assessment: Tolerated treatment well  Patient exhibited good technique with therapeutic exercises  Patient was able to perform all exercises today without increased pain  She continues to have difficulty with bed mobility, but was able to lift gluts off table  She is improving very slowly toward long term goals  Plan: Continue per plan of care

## 2019-03-14 ENCOUNTER — OFFICE VISIT (OUTPATIENT)
Dept: PHYSICAL THERAPY | Facility: CLINIC | Age: 64
End: 2019-03-14
Payer: COMMERCIAL

## 2019-03-14 DIAGNOSIS — G89.29 CHRONIC RIGHT SHOULDER PAIN: ICD-10-CM

## 2019-03-14 DIAGNOSIS — S32.591A CLOSED BILATERAL FRACTURE OF PUBIC RAMI, INITIAL ENCOUNTER (HCC): ICD-10-CM

## 2019-03-14 DIAGNOSIS — M75.41 IMPINGEMENT SYNDROME OF RIGHT SHOULDER: ICD-10-CM

## 2019-03-14 DIAGNOSIS — S32.9XXD CLOSED NONDISPLACED FRACTURE OF PELVIS WITH ROUTINE HEALING, UNSPECIFIED PART OF PELVIS, SUBSEQUENT ENCOUNTER: ICD-10-CM

## 2019-03-14 DIAGNOSIS — Z96.642 PRESENCE OF LEFT ARTIFICIAL HIP JOINT: Primary | ICD-10-CM

## 2019-03-14 DIAGNOSIS — S32.592A CLOSED BILATERAL FRACTURE OF PUBIC RAMI, INITIAL ENCOUNTER (HCC): ICD-10-CM

## 2019-03-14 DIAGNOSIS — M25.511 CHRONIC RIGHT SHOULDER PAIN: ICD-10-CM

## 2019-03-14 PROCEDURE — 97112 NEUROMUSCULAR REEDUCATION: CPT | Performed by: PHYSICAL THERAPIST

## 2019-03-14 PROCEDURE — 97110 THERAPEUTIC EXERCISES: CPT | Performed by: PHYSICAL THERAPIST

## 2019-03-18 ENCOUNTER — OFFICE VISIT (OUTPATIENT)
Dept: PHYSICAL THERAPY | Facility: CLINIC | Age: 64
End: 2019-03-18
Payer: COMMERCIAL

## 2019-03-18 DIAGNOSIS — G89.29 CHRONIC RIGHT SHOULDER PAIN: ICD-10-CM

## 2019-03-18 DIAGNOSIS — M25.511 CHRONIC RIGHT SHOULDER PAIN: ICD-10-CM

## 2019-03-18 DIAGNOSIS — M75.41 IMPINGEMENT SYNDROME OF RIGHT SHOULDER: ICD-10-CM

## 2019-03-18 DIAGNOSIS — Z96.642 PRESENCE OF LEFT ARTIFICIAL HIP JOINT: Primary | ICD-10-CM

## 2019-03-18 DIAGNOSIS — S32.9XXD CLOSED NONDISPLACED FRACTURE OF PELVIS WITH ROUTINE HEALING, UNSPECIFIED PART OF PELVIS, SUBSEQUENT ENCOUNTER: ICD-10-CM

## 2019-03-18 PROCEDURE — 97110 THERAPEUTIC EXERCISES: CPT | Performed by: PHYSICAL THERAPIST

## 2019-03-18 PROCEDURE — 97112 NEUROMUSCULAR REEDUCATION: CPT | Performed by: PHYSICAL THERAPIST

## 2019-03-18 NOTE — PROGRESS NOTES
Daily Note     Today's date: 3/18/2019  Patient name: Walter Mo  : 1955  MRN: [id number]  Referring provider: Austyn Dumont, *  Dx:   Encounter Diagnosis     ICD-10-CM    1  Presence of left artificial hip joint Z96 642    2  Closed nondisplaced fracture of pelvis with routine healing, unspecified part of pelvis, subsequent encounter S32  9XXD    3  Chronic right shoulder pain M25 511     G89 29    4  Impingement syndrome of right shoulder M75 41                   Subjective: Patient reports that she notes that her shoulder is getting much better  States that her hips were dislocated as a child, and she had a total hip replacement on the left  She reports that she feels like her hip ROM is completely back to normal   She just feels as though she continues to lack strength  Objective: See treatment diary below    Diagnosis: Right shoulder impingement   Precautions: scoliosis- fused TS   pelvis fx on 8/15/18, osteoporosis, L JOVANI, L TKR   Manual Therapy 3/18/19  3/7/19 3/11/19 3/14/19   Scapular mobs        PROM ER/IR   Hold secondary to time                             Exercise Diary         Wall slides flexion 10x   10x ea    Post capsule stretch 3x30 sec  7o79geo 5c46qcd 4k73xsq   Standing TB rows/ext RTB 20x    RTB 20x RTB 20x   UT stretch 1o57bma  5q60ewj 8n51yaj 7k05ahp   No monies with scap squeeze seated RTB 20x    RTB 20X  RTB 20x    Sidelying LT activation D/c to HEP  20x R 20x    Horizontal T's with rest between each rep 10x   NV YTB 10x -unable to perform with elbow straight   TB IR/ER RTB 20x        Hip flexion stretch with strap 15x ea       Strap stretch 2e13dld  7q59yeq Strap stretch: 0w80kdx 7d26evw   Heel slides AROM with strap assist- AROM for first 50% only 20x   20x, strap use for stretch only  AROM for first 50%  20x, strap use for stretch only  AROM for first 50%     Bridges 20x  10x ea Bridges: 20x  20x   QS D/c to HEP   20x 5 sec holds    Mini squats 20x  20x 20x 20x   Tandem balance 2x30 sec ea  57k17gif 54y43tcp 9i96lts ea   SLB 1e37ekj ea  3x15 sec ea 4l34gdm ea 3 x15sec ea   FT balance EC 6b01ikm  3x30 sec 1y11ygb EC 6a30wec   Standing marches- foot to step 4 in 3x10  2# 20x  2# 20x 2 5# 20x    LAQ D/c   1# 20x ea 2# 20x ea 2 5# 20x ea   Seated marches 20x ea  20xea 20x ea 20x ea   Pt education   Cane ambulation 2 times around [place] From table <> wall slides Hold today secondary to time  Modalities        CP PRN                            Assessment: Tolerated treatment well  Patient exhibited good technique with therapeutic exercises  Patient was able to perform exercises noted today  She continues to really struggle with AROM of the hips throughout, as well as shoulder AROM in horizontal abd  She is improving very slowly toward long term goals  Plan: Continue per plan of care

## 2019-03-21 ENCOUNTER — OFFICE VISIT (OUTPATIENT)
Dept: PHYSICAL THERAPY | Facility: CLINIC | Age: 64
End: 2019-03-21
Payer: COMMERCIAL

## 2019-03-21 DIAGNOSIS — S32.9XXD CLOSED NONDISPLACED FRACTURE OF PELVIS WITH ROUTINE HEALING, UNSPECIFIED PART OF PELVIS, SUBSEQUENT ENCOUNTER: ICD-10-CM

## 2019-03-21 DIAGNOSIS — M75.41 IMPINGEMENT SYNDROME OF RIGHT SHOULDER: ICD-10-CM

## 2019-03-21 DIAGNOSIS — M25.511 CHRONIC RIGHT SHOULDER PAIN: ICD-10-CM

## 2019-03-21 DIAGNOSIS — Z96.642 PRESENCE OF LEFT ARTIFICIAL HIP JOINT: Primary | ICD-10-CM

## 2019-03-21 DIAGNOSIS — G89.29 CHRONIC RIGHT SHOULDER PAIN: ICD-10-CM

## 2019-03-21 PROCEDURE — 97110 THERAPEUTIC EXERCISES: CPT | Performed by: PHYSICAL THERAPIST

## 2019-03-21 PROCEDURE — 97112 NEUROMUSCULAR REEDUCATION: CPT | Performed by: PHYSICAL THERAPIST

## 2019-03-21 NOTE — PROGRESS NOTES
Daily Note     Today's date: 3/21/2019  Patient name: Patsy Hashimoto  : 1955  MRN: [id number]  Referring provider: Allyson Severin, *  Dx:   Encounter Diagnosis     ICD-10-CM    1  Presence of left artificial hip joint Z96 642    2  Closed nondisplaced fracture of pelvis with routine healing, unspecified part of pelvis, subsequent encounter S32  9XXD    3  Chronic right shoulder pain M25 511     G89 29    4  Impingement syndrome of right shoulder M75 41                   Subjective: Patient states she feels that she is getting much stronger in both the shoulder and legs  She reports her shoulder has been bothering her at night, but it is just pinchy pain  Objective: See treatment diary below    Diagnosis: Right shoulder impingement   Precautions: scoliosis- fused TS   pelvis fx on 8/15/18, osteoporosis, L JOVANI, L TKR   Manual Therapy 3/18/19 3/21/19  3/11/19 3/14/19   Scapular mobs        PROM ER/IR                                Exercise Diary         Wall slides flexion 10x 10x  10x ea    Post capsule stretch 3x30 sec 4q10dwb  9a54kty 6y72bfd   Standing TB rows/ext RTB 20x  GTB 30x   RTB 20x RTB 20x   UT stretch 8u59tnm 8y93vre  2i47eqe 5a92bzz   No monies with scap squeeze seated RTB 20x  RTB 20x   RTB 20X  RTB 20x    Horizontal T's with rest between each rep 10x D/c   NV YTB 10x -unable to perform with elbow straight   TB IR/ER RTB 20x  RTB 20x       Hip flexion stretch with strap 15x ea D/c       Strap stretch 0k35wbh 5q04vfq  Strap stretch: 9t65nka 6p08bhr   Heel slides AROM with strap assist- AROM for first 50% only 20x 20x  20x, strap use for stretch only  AROM for first 50%  20x, strap use for stretch only  AROM for first 50%     Bridges 20x 20x  Bridges: 20x  20x   Mini squats 20x 20x  20x 20x   Tandem balance 2x30 sec ea 2p16fsc ea  23a26hic 4l05sym ea   SLB 2v21wek ea 7j39xls ea  1k38brr ea 3 x15sec ea   FT balance EC 3x53rca 4s02ffz  1m54qqd EC 3n80gmc   Standing marches- foot to step 4 in 3x10 4 in  3x10  2# 20x 2 5# 20x            Seated marches 20x ea 20x ea  20x ea 20x ea   Pt education    From table <> wall slides Hold today secondary to time  Modalities        CP PRN                          Assessment: Tolerated treatment well  Patient exhibited good technique with therapeutic exercises  Patient was able to perform all exercises today without increased pain  She continues to struggle with balance tasks and stability of the shoulder  She is improving slowly toward long term goals  Plan: Continue per plan of care

## 2019-03-25 ENCOUNTER — OFFICE VISIT (OUTPATIENT)
Dept: PHYSICAL THERAPY | Facility: CLINIC | Age: 64
End: 2019-03-25
Payer: COMMERCIAL

## 2019-03-25 DIAGNOSIS — Z96.642 PRESENCE OF LEFT ARTIFICIAL HIP JOINT: Primary | ICD-10-CM

## 2019-03-25 DIAGNOSIS — S32.9XXD CLOSED NONDISPLACED FRACTURE OF PELVIS WITH ROUTINE HEALING, UNSPECIFIED PART OF PELVIS, SUBSEQUENT ENCOUNTER: ICD-10-CM

## 2019-03-25 DIAGNOSIS — M25.511 CHRONIC RIGHT SHOULDER PAIN: ICD-10-CM

## 2019-03-25 DIAGNOSIS — S32.591A CLOSED BILATERAL FRACTURE OF PUBIC RAMI, INITIAL ENCOUNTER (HCC): ICD-10-CM

## 2019-03-25 DIAGNOSIS — M75.41 IMPINGEMENT SYNDROME OF RIGHT SHOULDER: ICD-10-CM

## 2019-03-25 DIAGNOSIS — G89.29 CHRONIC RIGHT SHOULDER PAIN: ICD-10-CM

## 2019-03-25 DIAGNOSIS — S32.592A CLOSED BILATERAL FRACTURE OF PUBIC RAMI, INITIAL ENCOUNTER (HCC): ICD-10-CM

## 2019-03-25 PROCEDURE — 97112 NEUROMUSCULAR REEDUCATION: CPT | Performed by: PHYSICAL THERAPIST

## 2019-03-25 PROCEDURE — 97110 THERAPEUTIC EXERCISES: CPT | Performed by: PHYSICAL THERAPIST

## 2019-03-25 NOTE — PROGRESS NOTES
Daily Note     Today's date: 3/25/2019  Patient name: Leida Shone  : 1955  MRN: [id number]  Referring provider: Fabiola Ames, *  Dx:   Encounter Diagnosis     ICD-10-CM    1  Presence of left artificial hip joint Z96 642    2  Closed nondisplaced fracture of pelvis with routine healing, unspecified part of pelvis, subsequent encounter S32  9XXD    3  Chronic right shoulder pain M25 511     G89 29    4  Impingement syndrome of right shoulder M75 41    5  Closed bilateral fracture of pubic rami, [facility] S32 591A     S32 592A                   Subjective: Patient reports that she has noticed significantly less pain in the shoulder over the weekend  She also notes that she is getting stronger in her legs, but notes walking on grass is very difficult  Objective: See treatment diary below    Diagnosis: Right shoulder impingement   Precautions: scoliosis- fused TS   pelvis fx on 8/15/18, osteoporosis, L JOVANI, L TKR   Manual Therapy 3/18/19 3/21/19 3/25/19  3/14/19   Scapular mobs        PROM ER/IR                                Exercise Diary         Wall slides flexion 10x 10x 10x     Post capsule stretch 3x30 sec 0f45zln 3p70exs  0e60mso   Standing TB rows/ext RTB 20x  GTB 30x  GTB 30x   RTB 20x   UT stretch 7o81jds 6q09nns 1m51lfs  2a13lns   No monies with scap squeeze seated RTB 20x  RTB 20x  GTB 2x 20  RTB 20x    TB IR/ER RTB 20x  RTB 20x  GTB 2x20      Strap stretch 7s30zgv 0d38fcj 7d02snj  1t12dti   Heel slides AROM with strap assist- AROM for first 50% only 20x 20x 20x  20x, strap use for stretch only  AROM for first 50%     Bridges 20x 20x 20x  20x   Mini squats 20x 20x On Airex: 20x  20x   Tandem balance 2x30 sec ea 3w48seu ea 4d92spo ea  3a94xud ea   SLB 4s54sib ea 2l19kld ea 0h80emb ea  3 x15sec ea   FT balance EC 2d03efc 9b33mps 6q54ybx  EC 9h53pmo   Standing marches- foot to step 4 in 3x10 4 in  3x10 4 in 3x10   2 5# 20x    Seated yenifer 20x ea 20x ea 20x ea  20x keyanna   SLR   15x ea  Hold today secondary to time  Modalities        CP PRN                            Assessment: Tolerated treatment well  Patient exhibited good technique with therapeutic exercises  Patient was able to perform all exercises today without increased pain  Noted improvement in balance tasks  Able to progress balance tasks  Plan: Continue per plan of care

## 2019-03-28 ENCOUNTER — OFFICE VISIT (OUTPATIENT)
Dept: PHYSICAL THERAPY | Facility: CLINIC | Age: 64
End: 2019-03-28
Payer: COMMERCIAL

## 2019-03-28 DIAGNOSIS — Z96.642 PRESENCE OF LEFT ARTIFICIAL HIP JOINT: Primary | ICD-10-CM

## 2019-03-28 DIAGNOSIS — S32.9XXD CLOSED NONDISPLACED FRACTURE OF PELVIS WITH ROUTINE HEALING, UNSPECIFIED PART OF PELVIS, SUBSEQUENT ENCOUNTER: ICD-10-CM

## 2019-03-28 DIAGNOSIS — M25.511 CHRONIC RIGHT SHOULDER PAIN: ICD-10-CM

## 2019-03-28 DIAGNOSIS — M75.41 IMPINGEMENT SYNDROME OF RIGHT SHOULDER: ICD-10-CM

## 2019-03-28 DIAGNOSIS — G89.29 CHRONIC RIGHT SHOULDER PAIN: ICD-10-CM

## 2019-03-28 DIAGNOSIS — S32.592A CLOSED BILATERAL FRACTURE OF PUBIC RAMI, INITIAL ENCOUNTER (HCC): ICD-10-CM

## 2019-03-28 DIAGNOSIS — S32.591A CLOSED BILATERAL FRACTURE OF PUBIC RAMI, INITIAL ENCOUNTER (HCC): ICD-10-CM

## 2019-03-28 PROCEDURE — 97112 NEUROMUSCULAR REEDUCATION: CPT

## 2019-03-28 PROCEDURE — 97110 THERAPEUTIC EXERCISES: CPT

## 2019-03-28 NOTE — PROGRESS NOTES
Daily Note     Today's date: 3/28/2019  Patient name: Latrice Mora  : 1955  MRN: [id number]  Referring provider: Vandana Kebede, *  Dx:   Encounter Diagnosis     ICD-10-CM    1  Presence of left artificial hip joint Z96 642    2  Closed nondisplaced fracture of pelvis with routine healing, unspecified part of pelvis, subsequent encounter S32  9XXD    3  Chronic right shoulder pain M25 511     G89 29    4  Impingement syndrome of right shoulder M75 41    5  [facility] pub[facility] rami[facility] S32 591A     S32 592A        Start Time: 1440  Stop Time: 1530  Total time in clinic (min): 50 minutes    Subjective: Pt reports feeling good and denies experiencing any pain prior to start of session    Objective: See treatment diary below    Diagnosis: Right shoulder impingement   Precautions: scoliosis- fused TS   pelvis fx on 8/15/18, osteoporosis, L JOVANI, L TKR   Manual Therapy 3/18/19 3/21/19 3/25/19 3/28/19    Scapular mobs        PROM ER/IR                                Exercise Diary         Wall slides flexion 10x 10x 10x 10x    Post capsule stretch 3x30 sec 4m62bsc 9t19mcm 3i89bmj    Standing TB rows/ext RTB 20x  GTB 30x  GTB 30x  GTB x20    UT stretch 7z54moz 3t72noh 0n39zmn 0h00rta    No monies with scap squeeze seated RTB 20x  RTB 20x  GTB 2x 20 GTB 2x 20    TB IR/ER RTB 20x  RTB 20x  GTB 2x20  GTB 2x20    Strap stretch 2s35kcu 4k70wel 6x44doq 3x30 sec    Heel slides AROM with strap assist- AROM for first 50% only 20x 20x 20x 20xea  strap use for stretch only    AROM for first 50%    Bridges 20x 20x 20x 20x    Mini squats 20x 20x On Airex: 20x On Airex: 20x    Tandem balance 2x30 sec ea 3n01cnc ea 9v16xky ea 2s18odt ea    SLB 8w01aqe ea 9p24ezo ea 2p01buq ea 4n15hee ea    FT balance EC 4n25ywm 6m83fsp 3w86ubm 5m13qdb    Standing marches- foot to step 4 in 3x10 4 in  3x10 4 in 3x10  4 in 3x10     Seated eynifer 20x ea 20x ea 20x ea 20x ea    SLR   15x ea 15x ea Tandem Walk    1 UE support  x2 laps    Side step at rail    x2 laps    Modalities        CP PRN                            Assessment: Tolerated treatment well  Patient demonstrated fatigue post treatment and would benefit from continued PT  Pt was able to perform all exercise with no c/o pain  Occasional verbal cues required for proper form/posture with exercises throughout treatment  Demonstrates visible limitations in both strength and balance  Tandem walk and side steps at rail added to further address these deficits  Plan: Continue per plan of care

## 2019-04-01 ENCOUNTER — OFFICE VISIT (OUTPATIENT)
Dept: PHYSICAL THERAPY | Facility: CLINIC | Age: 64
End: 2019-04-01
Payer: COMMERCIAL

## 2019-04-01 DIAGNOSIS — M25.511 CHRONIC RIGHT SHOULDER PAIN: ICD-10-CM

## 2019-04-01 DIAGNOSIS — Z96.642 PRESENCE OF LEFT ARTIFICIAL HIP JOINT: Primary | ICD-10-CM

## 2019-04-01 DIAGNOSIS — S32.9XXD CLOSED NONDISPLACED FRACTURE OF PELVIS WITH ROUTINE HEALING, UNSPECIFIED PART OF PELVIS, SUBSEQUENT ENCOUNTER: ICD-10-CM

## 2019-04-01 DIAGNOSIS — G89.29 CHRONIC RIGHT SHOULDER PAIN: ICD-10-CM

## 2019-04-01 DIAGNOSIS — M75.41 IMPINGEMENT SYNDROME OF RIGHT SHOULDER: ICD-10-CM

## 2019-04-01 PROCEDURE — 97110 THERAPEUTIC EXERCISES: CPT | Performed by: PHYSICAL THERAPIST

## 2019-04-01 PROCEDURE — 97112 NEUROMUSCULAR REEDUCATION: CPT | Performed by: PHYSICAL THERAPIST

## 2019-04-04 ENCOUNTER — EVALUATION (OUTPATIENT)
Dept: PHYSICAL THERAPY | Facility: CLINIC | Age: 64
End: 2019-04-04
Payer: COMMERCIAL

## 2019-04-04 ENCOUNTER — TRANSCRIBE ORDERS (OUTPATIENT)
Dept: PHYSICAL THERAPY | Facility: CLINIC | Age: 64
End: 2019-04-04

## 2019-04-04 DIAGNOSIS — M25.511 CHRONIC RIGHT SHOULDER PAIN: ICD-10-CM

## 2019-04-04 DIAGNOSIS — G89.29 CHRONIC RIGHT SHOULDER PAIN: ICD-10-CM

## 2019-04-04 DIAGNOSIS — S32.9XXD CLOSED NONDISPLACED FRACTURE OF PELVIS WITH ROUTINE HEALING, UNSPECIFIED PART OF PELVIS, SUBSEQUENT ENCOUNTER: ICD-10-CM

## 2019-04-04 DIAGNOSIS — M75.41 IMPINGEMENT SYNDROME OF RIGHT SHOULDER: ICD-10-CM

## 2019-04-04 DIAGNOSIS — Z96.642 PRESENCE OF LEFT ARTIFICIAL HIP JOINT: Primary | ICD-10-CM

## 2019-04-04 PROCEDURE — 97140 MANUAL THERAPY 1/> REGIONS: CPT | Performed by: PHYSICAL THERAPIST

## 2019-04-04 PROCEDURE — 97112 NEUROMUSCULAR REEDUCATION: CPT | Performed by: PHYSICAL THERAPIST

## 2019-04-08 ENCOUNTER — OFFICE VISIT (OUTPATIENT)
Dept: PHYSICAL THERAPY | Facility: CLINIC | Age: 64
End: 2019-04-08
Payer: COMMERCIAL

## 2019-04-08 DIAGNOSIS — M75.41 IMPINGEMENT SYNDROME OF RIGHT SHOULDER: ICD-10-CM

## 2019-04-08 DIAGNOSIS — M25.511 CHRONIC RIGHT SHOULDER PAIN: Primary | ICD-10-CM

## 2019-04-08 DIAGNOSIS — G89.29 CHRONIC RIGHT SHOULDER PAIN: Primary | ICD-10-CM

## 2019-04-08 PROCEDURE — 97112 NEUROMUSCULAR REEDUCATION: CPT | Performed by: PHYSICAL THERAPIST

## 2019-04-08 PROCEDURE — 97110 THERAPEUTIC EXERCISES: CPT | Performed by: PHYSICAL THERAPIST

## 2019-04-08 PROCEDURE — 97140 MANUAL THERAPY 1/> REGIONS: CPT | Performed by: PHYSICAL THERAPIST

## 2019-04-09 ENCOUNTER — OFFICE VISIT (OUTPATIENT)
Dept: PHYSICAL THERAPY | Facility: CLINIC | Age: 64
End: 2019-04-09
Payer: COMMERCIAL

## 2019-04-09 DIAGNOSIS — M75.41 IMPINGEMENT SYNDROME OF RIGHT SHOULDER: Primary | ICD-10-CM

## 2019-04-09 DIAGNOSIS — M25.511 CHRONIC RIGHT SHOULDER PAIN: ICD-10-CM

## 2019-04-09 DIAGNOSIS — G89.29 CHRONIC RIGHT SHOULDER PAIN: ICD-10-CM

## 2019-04-09 PROCEDURE — 97110 THERAPEUTIC EXERCISES: CPT | Performed by: PHYSICAL THERAPIST

## 2019-04-09 PROCEDURE — 97140 MANUAL THERAPY 1/> REGIONS: CPT | Performed by: PHYSICAL THERAPIST

## 2019-04-09 PROCEDURE — 97112 NEUROMUSCULAR REEDUCATION: CPT | Performed by: PHYSICAL THERAPIST

## 2019-04-15 ENCOUNTER — OFFICE VISIT (OUTPATIENT)
Dept: PHYSICAL THERAPY | Facility: CLINIC | Age: 64
End: 2019-04-15
Payer: COMMERCIAL

## 2019-04-15 DIAGNOSIS — G89.29 CHRONIC RIGHT SHOULDER PAIN: ICD-10-CM

## 2019-04-15 DIAGNOSIS — M75.41 IMPINGEMENT SYNDROME OF RIGHT SHOULDER: Primary | ICD-10-CM

## 2019-04-15 DIAGNOSIS — M25.511 CHRONIC RIGHT SHOULDER PAIN: ICD-10-CM

## 2019-04-15 PROCEDURE — 97140 MANUAL THERAPY 1/> REGIONS: CPT

## 2019-04-15 PROCEDURE — 97112 NEUROMUSCULAR REEDUCATION: CPT

## 2019-04-15 PROCEDURE — 97110 THERAPEUTIC EXERCISES: CPT

## 2019-04-18 ENCOUNTER — OFFICE VISIT (OUTPATIENT)
Dept: PHYSICAL THERAPY | Facility: CLINIC | Age: 64
End: 2019-04-18
Payer: COMMERCIAL

## 2019-04-18 DIAGNOSIS — M75.41 IMPINGEMENT SYNDROME OF RIGHT SHOULDER: Primary | ICD-10-CM

## 2019-04-18 DIAGNOSIS — M25.511 CHRONIC RIGHT SHOULDER PAIN: ICD-10-CM

## 2019-04-18 DIAGNOSIS — G89.29 CHRONIC RIGHT SHOULDER PAIN: ICD-10-CM

## 2019-04-18 PROCEDURE — 97112 NEUROMUSCULAR REEDUCATION: CPT | Performed by: PHYSICAL THERAPIST

## 2019-04-18 PROCEDURE — 97110 THERAPEUTIC EXERCISES: CPT | Performed by: PHYSICAL THERAPIST

## 2019-04-18 PROCEDURE — 97140 MANUAL THERAPY 1/> REGIONS: CPT | Performed by: PHYSICAL THERAPIST

## 2019-04-22 ENCOUNTER — OFFICE VISIT (OUTPATIENT)
Dept: PHYSICAL THERAPY | Facility: CLINIC | Age: 64
End: 2019-04-22
Payer: COMMERCIAL

## 2019-04-22 DIAGNOSIS — G89.29 CHRONIC RIGHT SHOULDER PAIN: ICD-10-CM

## 2019-04-22 DIAGNOSIS — M75.41 IMPINGEMENT SYNDROME OF RIGHT SHOULDER: Primary | ICD-10-CM

## 2019-04-22 DIAGNOSIS — M25.511 CHRONIC RIGHT SHOULDER PAIN: ICD-10-CM

## 2019-04-22 PROCEDURE — 97110 THERAPEUTIC EXERCISES: CPT | Performed by: PHYSICAL THERAPIST

## 2019-04-22 PROCEDURE — 97140 MANUAL THERAPY 1/> REGIONS: CPT | Performed by: PHYSICAL THERAPIST

## 2019-04-22 PROCEDURE — 97112 NEUROMUSCULAR REEDUCATION: CPT | Performed by: PHYSICAL THERAPIST

## 2019-04-25 ENCOUNTER — OFFICE VISIT (OUTPATIENT)
Dept: PHYSICAL THERAPY | Facility: CLINIC | Age: 64
End: 2019-04-25
Payer: COMMERCIAL

## 2019-04-25 DIAGNOSIS — M25.511 CHRONIC RIGHT SHOULDER PAIN: ICD-10-CM

## 2019-04-25 DIAGNOSIS — M75.41 IMPINGEMENT SYNDROME OF RIGHT SHOULDER: Primary | ICD-10-CM

## 2019-04-25 DIAGNOSIS — G89.29 CHRONIC RIGHT SHOULDER PAIN: ICD-10-CM

## 2019-04-25 PROCEDURE — 97110 THERAPEUTIC EXERCISES: CPT | Performed by: PHYSICAL THERAPIST

## 2019-04-25 PROCEDURE — 97140 MANUAL THERAPY 1/> REGIONS: CPT | Performed by: PHYSICAL THERAPIST

## 2019-04-25 PROCEDURE — 97112 NEUROMUSCULAR REEDUCATION: CPT | Performed by: PHYSICAL THERAPIST

## 2019-04-29 ENCOUNTER — OFFICE VISIT (OUTPATIENT)
Dept: PHYSICAL THERAPY | Facility: CLINIC | Age: 64
End: 2019-04-29
Payer: COMMERCIAL

## 2019-04-29 DIAGNOSIS — M75.41 IMPINGEMENT SYNDROME OF RIGHT SHOULDER: Primary | ICD-10-CM

## 2019-04-29 DIAGNOSIS — M25.511 CHRONIC RIGHT SHOULDER PAIN: ICD-10-CM

## 2019-04-29 DIAGNOSIS — G89.29 CHRONIC RIGHT SHOULDER PAIN: ICD-10-CM

## 2019-04-29 PROCEDURE — 97112 NEUROMUSCULAR REEDUCATION: CPT | Performed by: PHYSICAL THERAPIST

## 2019-04-29 PROCEDURE — 97110 THERAPEUTIC EXERCISES: CPT | Performed by: PHYSICAL THERAPIST

## 2019-04-29 PROCEDURE — 97140 MANUAL THERAPY 1/> REGIONS: CPT | Performed by: PHYSICAL THERAPIST

## 2019-05-02 ENCOUNTER — EVALUATION (OUTPATIENT)
Dept: PHYSICAL THERAPY | Facility: CLINIC | Age: 64
End: 2019-05-02
Payer: COMMERCIAL

## 2019-05-02 DIAGNOSIS — M25.511 CHRONIC RIGHT SHOULDER PAIN: ICD-10-CM

## 2019-05-02 DIAGNOSIS — G89.29 CHRONIC RIGHT SHOULDER PAIN: ICD-10-CM

## 2019-05-02 DIAGNOSIS — M75.41 IMPINGEMENT SYNDROME OF RIGHT SHOULDER: Primary | ICD-10-CM

## 2019-05-02 PROCEDURE — 97140 MANUAL THERAPY 1/> REGIONS: CPT | Performed by: PHYSICAL THERAPIST

## 2019-05-02 PROCEDURE — 97112 NEUROMUSCULAR REEDUCATION: CPT | Performed by: PHYSICAL THERAPIST

## 2019-05-06 ENCOUNTER — APPOINTMENT (OUTPATIENT)
Dept: PHYSICAL THERAPY | Facility: CLINIC | Age: 64
End: 2019-05-06
Payer: COMMERCIAL

## 2019-05-09 ENCOUNTER — APPOINTMENT (OUTPATIENT)
Dept: PHYSICAL THERAPY | Facility: CLINIC | Age: 64
End: 2019-05-09
Payer: COMMERCIAL

## 2019-05-13 ENCOUNTER — APPOINTMENT (OUTPATIENT)
Dept: PHYSICAL THERAPY | Facility: CLINIC | Age: 64
End: 2019-05-13
Payer: COMMERCIAL

## 2019-05-16 ENCOUNTER — APPOINTMENT (OUTPATIENT)
Dept: PHYSICAL THERAPY | Facility: CLINIC | Age: 64
End: 2019-05-16
Payer: COMMERCIAL

## 2019-05-20 ENCOUNTER — APPOINTMENT (OUTPATIENT)
Dept: PHYSICAL THERAPY | Facility: CLINIC | Age: 64
End: 2019-05-20
Payer: COMMERCIAL

## 2019-05-23 ENCOUNTER — APPOINTMENT (OUTPATIENT)
Dept: PHYSICAL THERAPY | Facility: CLINIC | Age: 64
End: 2019-05-23
Payer: COMMERCIAL

## 2020-03-05 NOTE — PROGRESS NOTES
PT Re-Evaluation     Today's date: 2019  Patient name: Juan Meneses  : 1955  MRN: [id number]  Referring provider: Eusebio Hutton, *  Dx:   Encounter Diagnosis     ICD-10-CM    1  Chronic right shoulder pain M25 511     G89 29    2  Impingement syndrome of right shoulder M75 41    3  Closed nondisplaced fracture of pelvis with routine healing, unspecified part of pelvis, subsequent encounter S32  9XXD    4  Presence of left artificial hip joint Z96 642        Start Time: 1545  Stop Time: 1645  Total time in clinic (min): 60 minutes    Assessment  Assessment details: Since starting PT, the patient has demonstrated improvements with R shoulder AROM, PROM and pain levels  Patient is able to perform a majority of ADLs with very minimal pain, however has pain at night 2* fatigue  Patient's strength continues to be a limitation and maybe why she has a little pain with reaching and loading  PT and patient agreed to treat the shoulder first, then focus on gait  PT will continue to address the ROM, strength and muscle endurance limitations by performing strengthening, stretching and manual techniques to allow the patient to return to her PLOF  PT recommended 2x/week for 4-6 weeks c a good prognosis 2* noted improvements  PT and patient agree with POC  Impairments: abnormal gait, abnormal or restricted ROM, activity intolerance, impaired balance, impaired physical strength, lacks appropriate home exercise program, pain with function, safety issue, weight-bearing intolerance, poor posture  and poor body mechanics    Symptom irritability: moderateUnderstanding of Dx/Px/POC: good   Prognosis: good    Goals  STG: In four weeks the patient will:    1  Be (I) with her HEP  (50% MET)  2  Increase R shoulder strength to 4/5 MMT score to assist c reaching and ADLs  (50% MET)  3  Increase R shoulder flexion and abduction AROM to 170 degrees to assist c ADLs  (50% MET)  4   Increase LE strength to 4/5 to assist c stairs and amb  (not met)  5  Perform tandem stance for 30 seconds with eyes open on a firm surface with minimal lateral sway  (MET)      LTG: In six weeks, the patient will:    1  Increase FOTO score to 39 on LE and 59 on Shoulder to demonstrate improvements in symptoms and function  (shoulder MET, LE 75% MET)  2  Demonstrate full R shoulder AROM without pain  (50% MET)  3  Perform 30 mins of activity without pain  (50% MET)  4  Increase LE strength to 5/5 MMT score to assist c functional activities  (not met)  5  Perform the TUG in <30 seconds to demonstrate less of a fall risk  (MET)  6  Perform tandem stance for 30 seconds with eyes open on a non- compliant surface with minimal lateral sway  (75% MET)    Plan  Patient would benefit from: skilled physical therapy  Referral necessary: No  Planned modality interventions: cryotherapy and thermotherapy: hydrocollator packs  Planned therapy interventions: abdominal trunk stabilization, joint mobilization, manual therapy, massage, Salazar taping, balance, balance/weight bearing training, gait training, home exercise program, therapeutic exercise, therapeutic activities, stretching, strengthening, postural training, patient education and neuromuscular re-education  Frequency: 2x week  Duration in visits: 12  Duration in weeks: 6  Plan of Care beginning date: 2/5/2019  Plan of Care expiration date: 3/19/2019  Treatment plan discussed with: patient        Subjective Evaluation    History of Present Illness  Date of onset: 8/15/2018  Mechanism of injury: Patient noted she feels 50% back to her PLOF in the R shoulder  Patient noted she does not have as much pain and is able to move it more  Patient is able to perform a majority of ADLs with minimal pain, however at night she has moderate pain 2* fatigue  Patient continues to have difficulty with lifting a gallon of milk and achy feelings with knitting   Patient noted her balance and gait is similar to eval 2* not working on it  PT and patient agreed to work on the shoulder then the gait/balance in the future  Recurrent probem    Quality of life: good    Pain  Current pain ratin (R shoulder)  At best pain ratin (R shoulder, after home PT)  At worst pain ratin (R shoulder)  Location: R post capsule and biceps  Quality: dull ache (No numbness or tingling in R UE)  Relieving factors: ice and medications  Aggravating factors: standing, walking and stair climbing  Progression: worsening (R shoulder pain)    Social Support  Exterior steps/ramp assessed: 3 JJ  Lives in: [facility] [employer] [place]  Lives with: spouse    Employment status: not working (Retired)  Hand dominance: right  Exercise history: none    Treatments  Previous treatment: home therapy (After fall in August)  Patient Goals  Patient goals for therapy: decreased pain, improved balance, increased motion, increased strength, independence with ADLs/IADLs and return to sport/leisure activities  Patient goal: "decreasing pain and increasing strength "        Objective     Postural Observations  Seated posture: fair  Standing posture: fair        Palpation     Right   Tenderness of the biceps, infraspinatus and supraspinatus       Neurological Testing     Sensation     Shoulder   Left Shoulder   Intact: light touch    Right Shoulder   Intact: light touch    Hip   Left Hip   Intact: light touch    Right Hip   Intact: light touch    Active Range of Motion   Left Shoulder   Normal active range of motion    Right Shoulder   Flexion: 152 degrees   Abduction: 175 degrees   External rotation BTH: C2   Internal rotation BTB: L4     Passive Range of Motion     Right Shoulder   Flexion: 160 degrees   Abduction: 175 degrees     Strength/Myotome Testing     Left Shoulder     Planes of Motion   Flexion: 4+   Abduction: 4+   External rotation at 0°: 4   Internal rotation at 0°: 4     Isolated Muscles   Biceps: 4   Triceps: 4     Right Shoulder     Planes of Motion   Flexion: 3+ (Pain)   Abduction: 3+ (Pain)   External rotation at 0°: 3+   Internal rotation at 0°: 4     Isolated Muscles   Biceps: 4   Triceps: 4     Left Hip   Planes of Motion   Flexion: 3+  Extension: 3+  Abduction: 4-  Adduction: 3+    Right Hip   Planes of Motion   Flexion: 3-  Extension: 3+  Abduction: 4-  Adduction: 3+    Additional Strength Details  Knee flexion: 3+/5  Knee extension: 4/5  DF: 4/5  PF: 4/5    Tests     Right Shoulder   Positive drop arm, empty can, full can and Neer's  Negative belly press and lift-off  Ambulation     Observational Gait   Gait: asymmetric   Decreased walking speed and stride length  Additional Observational Gait Details  Valgus at L knee    Comments   TU seconds with RW    FTEO: 30 seconds  FTEC: 30 seconds minimal sway  Tandem: L 10 seconds, R 10 seconds          Flowsheet Rows      Most Recent Value   PT/OT G-Codes   Current Score  LE: 32, shoulder:60   Projected Score  LE: 39, shoulder: 61   FOTO information reviewed  Yes   Assessment Type  Re-evaluation   G code set  Carrying, Moving & Handling Objects        Precautions: Pelvis fx on 8/15/18, osteoporosis, L JOVANI, L TKR    Daily Treatment Diary     Manual  2/5 1/3 1/7 1/8 1/14 1/17 1/21 1/24 1/28 1/31   R shoulder PROM  5' MW np MW MW MW np MW 10 mins 15 mins  +post cap   R shoulder posterior mobs and distraction  11' MW np MW MW MW np MW NP NP   Re-eval 20 mins                             Exercise Diary  2/5 1/3 1/7 1/8 1/14 1/17 1/21 1/24 1/28 1/31   UBE retro nv 5'  5' 5'/ UBE back 5' UBE  5'  bkwd UBE 5' bkwd UBE  5' bkwd UBE  5'  retro UBE  5'  Retro   AAROM flexion, abd, ER with cane HEP x20 ea  x10 abd seated 2x10 ea seated 2x10 ea 2x10 ea nv 2x10 ea  2x10  Ea  Shoulder isometrics: flex, ext, abd, ER, IR HEP   X15, 5" hold ea x20 ea, 5" hold x20 ea  5" hold x20 ea  5" hold x20 ea  5" hold 5"x20 5"x20   Pec stretch & biceps stretch     nv 5x15" ea 5 x 15" ea 5x15" 15"x5 ea  15"x5 ea     Rows and extensions x20  5" hold  GTB x20 ea, 5" hold  x20 ea, 5" hold x20  Ea, 5" ea x20 ea,  5" hold X 30 3" hold x30  3" hold 3"x30 NP   Serratus punches     X20, 3" hold x20  3" hold X 20 3" hold 1# nv 1#  Cane  3"x20 1#  Cane  3"x20   Wall slides       10 x 10" holds flx nv NV NV   No moneys RTB  x20  5" hold    x20 ea,  5" hold  RTB x20  5" ea  RTB X 20 5" ea RTB with scap retraction x20  5" hold with scap retraction 5"x20 RTB  5"x20   Shoulder IR/ER YTB  x20 ea            Upper trap and levator scap stretch 3x30" ea            Posterior cap stretch np         10"x10   HEP: AAROM shoulder flex, abd, ER; scap retraction, bridge, glute set, hip abd (RTB), hip add, shoulder isometrics, biceps stretch, pec stretch    Modalities   1/3 1/14 1/17 1/21 1/24 1/28 1/31     HP/CP PRN  10'CP 10' CP post 5' CP post Declined 10' HP post MH post  8'  [place]  Post  10' (4) excellent

## 2020-04-02 DIAGNOSIS — F41.9 ANXIETY AND DEPRESSION: Chronic | ICD-10-CM

## 2020-04-02 DIAGNOSIS — F32.A ANXIETY AND DEPRESSION: Chronic | ICD-10-CM

## 2020-04-03 RX ORDER — ALPRAZOLAM 0.25 MG/1
0.25 TABLET ORAL 2 TIMES DAILY PRN
Qty: 6 TABLET | Refills: 0 | Status: SHIPPED | OUTPATIENT
Start: 2020-04-03 | End: 2020-04-05

## 2020-04-04 DIAGNOSIS — F41.9 ANXIETY AND DEPRESSION: Chronic | ICD-10-CM

## 2020-04-04 DIAGNOSIS — F32.A ANXIETY AND DEPRESSION: Chronic | ICD-10-CM

## 2020-04-05 RX ORDER — ALPRAZOLAM 0.25 MG/1
TABLET ORAL
Qty: 60 TABLET | Refills: 0 | Status: SHIPPED | OUTPATIENT
Start: 2020-04-05 | End: 2020-06-09 | Stop reason: CLARIF

## 2020-05-11 PROBLEM — E55.9 VITAMIN D DEFICIENCY: Status: ACTIVE | Noted: 2020-05-11

## 2020-05-11 PROBLEM — M19.90 OSTEOARTHRITIS: Status: ACTIVE | Noted: 2020-05-11

## 2020-05-11 PROBLEM — Z87.442 HISTORY OF KIDNEY STONES: Status: ACTIVE | Noted: 2020-05-11

## 2020-05-11 PROBLEM — T81.72XA POSTOPERATIVE ACUTE DEEP VEIN THROMBOSIS (DVT) OF LOWER EXTREMITY (HCC): Status: ACTIVE | Noted: 2020-05-11

## 2020-05-11 PROBLEM — I82.409 POSTOPERATIVE ACUTE DEEP VEIN THROMBOSIS (DVT) OF LOWER EXTREMITY (HCC): Status: ACTIVE | Noted: 2020-05-11

## 2020-05-11 PROBLEM — M85.851 OSTEOPENIA OF RIGHT HIP: Status: ACTIVE | Noted: 2020-05-11

## 2020-05-11 PROBLEM — F41.8 MIXED ANXIETY AND DEPRESSIVE DISORDER: Status: ACTIVE | Noted: 2018-08-16

## 2020-05-12 ENCOUNTER — TELEMEDICINE (OUTPATIENT)
Dept: FAMILY MEDICINE CLINIC | Facility: CLINIC | Age: 65
End: 2020-05-12
Payer: COMMERCIAL

## 2020-05-12 VITALS — HEIGHT: 61 IN | BODY MASS INDEX: 43.43 KG/M2 | WEIGHT: 230 LBS

## 2020-05-12 DIAGNOSIS — E55.9 VITAMIN D DEFICIENCY: ICD-10-CM

## 2020-05-12 DIAGNOSIS — M85.851 OSTEOPENIA OF RIGHT HIP: ICD-10-CM

## 2020-05-12 DIAGNOSIS — M15.9 PRIMARY OSTEOARTHRITIS INVOLVING MULTIPLE JOINTS: ICD-10-CM

## 2020-05-12 DIAGNOSIS — F41.8 MIXED ANXIETY AND DEPRESSIVE DISORDER: Primary | ICD-10-CM

## 2020-05-12 DIAGNOSIS — N39.41 URGE INCONTINENCE OF URINE: ICD-10-CM

## 2020-05-12 PROCEDURE — 99214 OFFICE O/P EST MOD 30 MIN: CPT | Performed by: FAMILY MEDICINE

## 2020-05-12 PROCEDURE — 3008F BODY MASS INDEX DOCD: CPT | Performed by: FAMILY MEDICINE

## 2020-05-12 RX ORDER — ALPRAZOLAM 0.25 MG/1
0.25 TABLET ORAL 3 TIMES DAILY PRN
COMMUNITY
Start: 2016-11-07 | End: 2020-06-09 | Stop reason: SDUPTHER

## 2020-05-12 RX ORDER — PAROXETINE 10 MG/1
10 TABLET, FILM COATED ORAL DAILY
Qty: 30 EACH | Refills: 3 | Status: SHIPPED | OUTPATIENT
Start: 2020-05-12 | End: 2020-07-13

## 2020-05-15 DIAGNOSIS — R32 INCONTINENCE IN FEMALE: Primary | ICD-10-CM

## 2020-05-15 RX ORDER — TOLTERODINE TARTRATE 1 MG/1
1 TABLET, EXTENDED RELEASE ORAL 2 TIMES DAILY
Qty: 60 TABLET | Refills: 3 | Status: SHIPPED | OUTPATIENT
Start: 2020-05-15 | End: 2020-08-13 | Stop reason: HOSPADM

## 2020-05-15 RX ORDER — TOLTERODINE TARTRATE 1 MG/1
1 TABLET, EXTENDED RELEASE ORAL 2 TIMES DAILY
COMMUNITY
End: 2020-05-15 | Stop reason: SDUPTHER

## 2020-06-09 DIAGNOSIS — F41.9 ANXIETY: Primary | ICD-10-CM

## 2020-06-09 RX ORDER — ALPRAZOLAM 0.25 MG/1
TABLET ORAL
COMMUNITY
End: 2020-06-09 | Stop reason: CLARIF

## 2020-06-09 RX ORDER — ALPRAZOLAM 0.25 MG/1
0.25 TABLET ORAL 3 TIMES DAILY PRN
Qty: 60 TABLET | Refills: 0 | Status: SHIPPED | OUTPATIENT
Start: 2020-06-09 | End: 2020-08-06 | Stop reason: SDUPTHER

## 2020-07-13 ENCOUNTER — TELEPHONE (OUTPATIENT)
Dept: FAMILY MEDICINE CLINIC | Facility: CLINIC | Age: 65
End: 2020-07-13

## 2020-07-13 DIAGNOSIS — Z76.0 MEDICINE REFILL: Primary | ICD-10-CM

## 2020-07-13 RX ORDER — PAROXETINE HYDROCHLORIDE 20 MG/1
20 TABLET, FILM COATED ORAL DAILY
Qty: 30 TABLET | Refills: 3 | Status: CANCELLED | OUTPATIENT
Start: 2020-07-13

## 2020-07-13 RX ORDER — PAROXETINE HYDROCHLORIDE 20 MG/1
20 TABLET, FILM COATED ORAL DAILY
COMMUNITY
End: 2020-07-15 | Stop reason: SDUPTHER

## 2020-07-13 NOTE — TELEPHONE ENCOUNTER
Will be needing refill of Paxil BUT is asking for dose to increase or mg  (Is currently on 10mg), said it's just not quite working

## 2020-07-15 ENCOUNTER — TELEPHONE (OUTPATIENT)
Dept: FAMILY MEDICINE CLINIC | Facility: CLINIC | Age: 65
End: 2020-07-15

## 2020-07-15 DIAGNOSIS — F41.9 ANXIETY: Primary | ICD-10-CM

## 2020-07-15 RX ORDER — PAROXETINE HYDROCHLORIDE 20 MG/1
20 TABLET, FILM COATED ORAL DAILY
Qty: 30 TABLET | Refills: 3 | Status: SHIPPED | OUTPATIENT
Start: 2020-07-15 | End: 2020-09-14 | Stop reason: SDUPTHER

## 2020-07-15 NOTE — TELEPHONE ENCOUNTER
Called Monday & asked for refill of Paxil AND wanted the mg to go from 10mg to 20mg, BUT pharmacy did not receive order

## 2020-08-06 ENCOUNTER — TELEPHONE (OUTPATIENT)
Dept: FAMILY MEDICINE CLINIC | Facility: CLINIC | Age: 65
End: 2020-08-06

## 2020-08-06 DIAGNOSIS — F41.9 ANXIETY: ICD-10-CM

## 2020-08-06 RX ORDER — ALPRAZOLAM 0.25 MG/1
0.25 TABLET ORAL 3 TIMES DAILY PRN
Qty: 60 TABLET | Refills: 0 | Status: SHIPPED | OUTPATIENT
Start: 2020-08-06 | End: 2020-10-20 | Stop reason: SDUPTHER

## 2020-08-09 ENCOUNTER — APPOINTMENT (EMERGENCY)
Dept: CT IMAGING | Facility: HOSPITAL | Age: 65
DRG: 494 | End: 2020-08-09
Payer: COMMERCIAL

## 2020-08-09 ENCOUNTER — HOSPITAL ENCOUNTER (INPATIENT)
Facility: HOSPITAL | Age: 65
LOS: 3 days | Discharge: HOME HEALTH | DRG: 494 | End: 2020-08-13
Attending: EMERGENCY MEDICINE | Admitting: INTERNAL MEDICINE
Payer: COMMERCIAL

## 2020-08-09 ENCOUNTER — APPOINTMENT (EMERGENCY)
Dept: RADIOLOGY | Facility: HOSPITAL | Age: 65
DRG: 494 | End: 2020-08-09
Payer: COMMERCIAL

## 2020-08-09 DIAGNOSIS — N39.41 URGE INCONTINENCE OF URINE: ICD-10-CM

## 2020-08-09 DIAGNOSIS — S42.309A HUMERUS FRACTURE: Primary | ICD-10-CM

## 2020-08-09 DIAGNOSIS — S32.591A CLOSED BILATERAL FRACTURE OF PUBIC RAMI, INITIAL ENCOUNTER (HCC): ICD-10-CM

## 2020-08-09 DIAGNOSIS — S42.492A OTHER CLOSED DISPLACED FRACTURE OF DISTAL END OF LEFT HUMERUS, INITIAL ENCOUNTER: ICD-10-CM

## 2020-08-09 DIAGNOSIS — K59.00 CONSTIPATION: ICD-10-CM

## 2020-08-09 DIAGNOSIS — S32.592A CLOSED BILATERAL FRACTURE OF PUBIC RAMI, INITIAL ENCOUNTER (HCC): ICD-10-CM

## 2020-08-09 PROBLEM — S42.409A CLOSED FRACTURE OF DISTAL END OF HUMERUS: Status: ACTIVE | Noted: 2020-08-09

## 2020-08-09 LAB
ANION GAP SERPL CALCULATED.3IONS-SCNC: 5 MMOL/L (ref 4–13)
BASOPHILS # BLD MANUAL: 0 THOUSAND/UL (ref 0–0.1)
BASOPHILS NFR MAR MANUAL: 0 % (ref 0–1)
BUN SERPL-MCNC: 18 MG/DL (ref 5–25)
CALCIUM SERPL-MCNC: 8.6 MG/DL (ref 8.3–10.1)
CHLORIDE SERPL-SCNC: 105 MMOL/L (ref 100–108)
CO2 SERPL-SCNC: 30 MMOL/L (ref 21–32)
CREAT SERPL-MCNC: 0.35 MG/DL (ref 0.6–1.3)
EOSINOPHIL # BLD MANUAL: 0 THOUSAND/UL (ref 0–0.4)
EOSINOPHIL NFR BLD MANUAL: 0 % (ref 0–6)
ERYTHROCYTE [DISTWIDTH] IN BLOOD BY AUTOMATED COUNT: 13.6 % (ref 11.6–15.1)
GFR SERPL CREATININE-BSD FRML MDRD: 115 ML/MIN/1.73SQ M
GLUCOSE SERPL-MCNC: 126 MG/DL (ref 65–140)
HCT VFR BLD AUTO: 41.6 % (ref 34.8–46.1)
HGB BLD-MCNC: 13.4 G/DL (ref 11.5–15.4)
LYMPHOCYTES # BLD AUTO: 1.36 THOUSAND/UL (ref 0.6–4.47)
LYMPHOCYTES # BLD AUTO: 10 % (ref 14–44)
MCH RBC QN AUTO: 30.8 PG (ref 26.8–34.3)
MCHC RBC AUTO-ENTMCNC: 32.2 G/DL (ref 31.4–37.4)
MCV RBC AUTO: 96 FL (ref 82–98)
MONOCYTES # BLD AUTO: 0.27 THOUSAND/UL (ref 0–1.22)
MONOCYTES NFR BLD: 2 % (ref 4–12)
NEUTROPHILS # BLD MANUAL: 11.95 THOUSAND/UL (ref 1.85–7.62)
NEUTS SEG NFR BLD AUTO: 88 % (ref 43–75)
NRBC BLD AUTO-RTO: 0 /100 WBCS
PLATELET # BLD AUTO: 215 THOUSANDS/UL (ref 149–390)
PLATELET BLD QL SMEAR: ADEQUATE
PMV BLD AUTO: 10.5 FL (ref 8.9–12.7)
POTASSIUM SERPL-SCNC: 4.2 MMOL/L (ref 3.5–5.3)
RBC # BLD AUTO: 4.35 MILLION/UL (ref 3.81–5.12)
RBC MORPH BLD: NORMAL
SODIUM SERPL-SCNC: 140 MMOL/L (ref 136–145)
TOTAL CELLS COUNTED SPEC: 100
WBC # BLD AUTO: 13.58 THOUSAND/UL (ref 4.31–10.16)

## 2020-08-09 PROCEDURE — G1004 CDSM NDSC: HCPCS

## 2020-08-09 PROCEDURE — 73200 CT UPPER EXTREMITY W/O DYE: CPT

## 2020-08-09 PROCEDURE — 99285 EMERGENCY DEPT VISIT HI MDM: CPT

## 2020-08-09 PROCEDURE — 80048 BASIC METABOLIC PNL TOTAL CA: CPT | Performed by: EMERGENCY MEDICINE

## 2020-08-09 PROCEDURE — 36415 COLL VENOUS BLD VENIPUNCTURE: CPT | Performed by: EMERGENCY MEDICINE

## 2020-08-09 PROCEDURE — 73060 X-RAY EXAM OF HUMERUS: CPT

## 2020-08-09 PROCEDURE — 99285 EMERGENCY DEPT VISIT HI MDM: CPT | Performed by: EMERGENCY MEDICINE

## 2020-08-09 PROCEDURE — 72125 CT NECK SPINE W/O DYE: CPT

## 2020-08-09 PROCEDURE — 85027 COMPLETE CBC AUTOMATED: CPT | Performed by: EMERGENCY MEDICINE

## 2020-08-09 PROCEDURE — 70450 CT HEAD/BRAIN W/O DYE: CPT

## 2020-08-09 PROCEDURE — 85007 BL SMEAR W/DIFF WBC COUNT: CPT | Performed by: EMERGENCY MEDICINE

## 2020-08-09 PROCEDURE — 99218 PR INITIAL OBSERVATION CARE/DAY 30 MINUTES: CPT | Performed by: FAMILY MEDICINE

## 2020-08-09 PROCEDURE — 96374 THER/PROPH/DIAG INJ IV PUSH: CPT

## 2020-08-09 PROCEDURE — 24505 CLTX HUMRL SHFT FX W/MNPJ: CPT | Performed by: EMERGENCY MEDICINE

## 2020-08-09 RX ORDER — OXYBUTYNIN CHLORIDE 5 MG/1
5 TABLET ORAL DAILY
Status: DISCONTINUED | OUTPATIENT
Start: 2020-08-09 | End: 2020-08-13 | Stop reason: HOSPADM

## 2020-08-09 RX ORDER — MORPHINE SULFATE 4 MG/ML
4 INJECTION, SOLUTION INTRAMUSCULAR; INTRAVENOUS ONCE
Status: COMPLETED | OUTPATIENT
Start: 2020-08-09 | End: 2020-08-09

## 2020-08-09 RX ORDER — DOCUSATE SODIUM 100 MG/1
100 CAPSULE, LIQUID FILLED ORAL 2 TIMES DAILY
Status: DISCONTINUED | OUTPATIENT
Start: 2020-08-09 | End: 2020-08-13 | Stop reason: HOSPADM

## 2020-08-09 RX ORDER — SENNOSIDES 8.6 MG
1 TABLET ORAL
Status: DISCONTINUED | OUTPATIENT
Start: 2020-08-09 | End: 2020-08-10

## 2020-08-09 RX ORDER — FENTANYL CITRATE 50 UG/ML
1 INJECTION, SOLUTION INTRAMUSCULAR; INTRAVENOUS ONCE
Status: COMPLETED | OUTPATIENT
Start: 2020-08-09 | End: 2020-08-09

## 2020-08-09 RX ORDER — ACETAMINOPHEN 325 MG/1
975 TABLET ORAL EVERY 8 HOURS
Status: DISCONTINUED | OUTPATIENT
Start: 2020-08-09 | End: 2020-08-13 | Stop reason: HOSPADM

## 2020-08-09 RX ORDER — BUPIVACAINE HYDROCHLORIDE 2.5 MG/ML
20 INJECTION, SOLUTION EPIDURAL; INFILTRATION; INTRACAUDAL ONCE
Status: DISCONTINUED | OUTPATIENT
Start: 2020-08-09 | End: 2020-08-09

## 2020-08-09 RX ORDER — BUPIVACAINE HYDROCHLORIDE 2.5 MG/ML
30 INJECTION, SOLUTION EPIDURAL; INFILTRATION; INTRACAUDAL ONCE
Status: COMPLETED | OUTPATIENT
Start: 2020-08-09 | End: 2020-08-09

## 2020-08-09 RX ORDER — PAROXETINE HYDROCHLORIDE 20 MG/1
20 TABLET, FILM COATED ORAL DAILY
Status: DISCONTINUED | OUTPATIENT
Start: 2020-08-09 | End: 2020-08-13 | Stop reason: HOSPADM

## 2020-08-09 RX ORDER — ACETAMINOPHEN 325 MG/1
650 TABLET ORAL EVERY 6 HOURS PRN
Qty: 30 TABLET | Refills: 0 | Status: SHIPPED | OUTPATIENT
Start: 2020-08-09 | End: 2020-08-14

## 2020-08-09 RX ORDER — METHOCARBAMOL 500 MG/1
500 TABLET, FILM COATED ORAL EVERY 6 HOURS PRN
Status: DISCONTINUED | OUTPATIENT
Start: 2020-08-09 | End: 2020-08-13 | Stop reason: HOSPADM

## 2020-08-09 RX ORDER — POLYETHYLENE GLYCOL 3350 17 G/17G
17 POWDER, FOR SOLUTION ORAL DAILY
Status: DISCONTINUED | OUTPATIENT
Start: 2020-08-09 | End: 2020-08-13 | Stop reason: HOSPADM

## 2020-08-09 RX ORDER — ALPRAZOLAM 0.25 MG/1
0.25 TABLET ORAL 3 TIMES DAILY PRN
Status: DISCONTINUED | OUTPATIENT
Start: 2020-08-09 | End: 2020-08-13 | Stop reason: HOSPADM

## 2020-08-09 RX ORDER — MORPHINE SULFATE 15 MG/1
15 TABLET ORAL EVERY 8 HOURS PRN
Qty: 10 TABLET | Refills: 0 | Status: SHIPPED | OUTPATIENT
Start: 2020-08-09 | End: 2020-08-13 | Stop reason: HOSPADM

## 2020-08-09 RX ORDER — FENTANYL CITRATE 50 UG/ML
100 INJECTION, SOLUTION INTRAMUSCULAR; INTRAVENOUS ONCE
Status: COMPLETED | OUTPATIENT
Start: 2020-08-09 | End: 2020-08-09

## 2020-08-09 RX ORDER — MELATONIN
1000 DAILY
Status: DISCONTINUED | OUTPATIENT
Start: 2020-08-09 | End: 2020-08-13 | Stop reason: HOSPADM

## 2020-08-09 RX ORDER — ONDANSETRON 2 MG/ML
4 INJECTION INTRAMUSCULAR; INTRAVENOUS EVERY 6 HOURS PRN
Status: DISCONTINUED | OUTPATIENT
Start: 2020-08-09 | End: 2020-08-13 | Stop reason: HOSPADM

## 2020-08-09 RX ADMIN — MORPHINE SULFATE 4 MG: 4 INJECTION INTRAVENOUS at 09:15

## 2020-08-09 RX ADMIN — Medication 1000 UNITS: at 10:24

## 2020-08-09 RX ADMIN — ACETAMINOPHEN 975 MG: 325 TABLET, FILM COATED ORAL at 10:23

## 2020-08-09 RX ADMIN — OXYBUTYNIN CHLORIDE 5 MG: 5 TABLET ORAL at 11:37

## 2020-08-09 RX ADMIN — BUPIVACAINE HYDROCHLORIDE 30 ML: 2.5 INJECTION, SOLUTION EPIDURAL; INFILTRATION; INTRACAUDAL; PERINEURAL at 05:41

## 2020-08-09 RX ADMIN — ACETAMINOPHEN 975 MG: 325 TABLET, FILM COATED ORAL at 17:45

## 2020-08-09 RX ADMIN — DOCUSATE SODIUM 100 MG: 100 CAPSULE, LIQUID FILLED ORAL at 10:23

## 2020-08-09 RX ADMIN — FENTANYL CITRATE 100 MCG: 50 INJECTION INTRAMUSCULAR; INTRAVENOUS at 05:02

## 2020-08-09 RX ADMIN — METHOCARBAMOL TABLETS 500 MG: 500 TABLET, COATED ORAL at 19:00

## 2020-08-09 RX ADMIN — PAROXETINE 20 MG: 20 TABLET, FILM COATED ORAL at 10:23

## 2020-08-09 RX ADMIN — DOCUSATE SODIUM 100 MG: 100 CAPSULE, LIQUID FILLED ORAL at 17:45

## 2020-08-09 RX ADMIN — ALPRAZOLAM 0.25 MG: 0.25 TABLET ORAL at 10:23

## 2020-08-09 RX ADMIN — MORPHINE SULFATE 2 MG: 2 INJECTION, SOLUTION INTRAMUSCULAR; INTRAVENOUS at 23:55

## 2020-08-09 RX ADMIN — ENOXAPARIN SODIUM 40 MG: 40 INJECTION SUBCUTANEOUS at 09:29

## 2020-08-09 RX ADMIN — MORPHINE SULFATE 2 MG: 2 INJECTION, SOLUTION INTRAMUSCULAR; INTRAVENOUS at 15:19

## 2020-08-09 NOTE — PLAN OF CARE
Problem: Potential for Falls  Goal: Patient will remain free of falls  Description: INTERVENTIONS:  - Assess patient frequently for physical needs  -  Identify cognitive and physical deficits and behaviors that affect risk of falls  -  [place] fall precautions as indicated by assessment   - Educate patient/family on patient safety including physical limitations  - Instruct patient to call for assistance with activity based on assessment  - Modify environment to reduce risk of injury  - Consider OT/PT consult to assist with strengthening/mobility  Outcome: Progressing     Problem: Prexisting or High Potential for Compromised Skin Integrity  Goal: Skin integrity is maintained or improved  Description: INTERVENTIONS:  - Identify patients at risk for skin breakdown  - Assess and monitor skin integrity  - Assess and monitor nutrition and hydration status  - Monitor labs   - Assess for incontinence   - Turn and reposition patient  - Assist with mobility/ambulation  - Relieve pressure over bony prominences  - Avoid friction and shearing  - Provide appropriate hygiene as needed including keeping skin clean and dry  - Evaluate need for skin moisturizer/barrier cream  - Collaborate with interdisciplinary team   - Patient/family teaching  - Consider wound care consult   Outcome: Progressing     Problem: Nutrition/Hydration-ADULT  Goal: Nutrient/Hydration intake appropriate for improving, restoring or maintaining nutritional needs  Description: Monitor and assess patient's nutrition/hydration status for malnutrition  Collaborate with interdisciplinary team and initiate plan and interventions as ordered  Monitor patient's weight and dietary intake as ordered or per policy  Utilize nutrition screening tool and intervene as necessary  Determine patient's food preferences and provide high-protein, high-caloric foods as appropriate       INTERVENTIONS:  - Monitor oral intake, urinary output, labs, and treatment plans  - Assess nutrition and hydration status and recommend course of action  - Evaluate amount of meals eaten  - Assist patient with eating if necessary   - Allow adequate time for meals  - Recommend/ encourage appropriate diets, oral nutritional supplements, and vitamin/mineral supplements  - Order, calculate, and assess calorie counts as needed  - Recommend, monitor, and adjust tube feedings and TPN/PPN based on assessed needs  - Assess need for intravenous fluids  - Provide specific nutrition/hydration education as appropriate  - Include patient/family/caregiver in decisions related to nutrition  Outcome: Progressing     Problem: PAIN - ADULT  Goal: Verbalizes/displays adequate comfort level or baseline comfort level  Description: Interventions:  - Encourage patient to monitor pain and request assistance  - Assess pain using appropriate pain scale  - Administer analgesics based on type and severity of pain and evaluate response  - Implement non-pharmacological measures as appropriate and evaluate response  - Consider cultural and social influences on pain and pain management  - Notify physician/advanced practitioner if interventions unsuccessful or patient reports new pain  Outcome: Progressing     Problem: INFECTION - ADULT  Goal: Absence or prevention of progression during hospitalization  Description: INTERVENTIONS:  - Assess and monitor for signs and symptoms of infection  - Monitor lab/diagnostic results  - Monitor all insertion sites, i e  indwelling lines, tubes, and drains  - Monitor endotracheal if appropriate and nasal secretions for changes in amount and color  - [place] appropriate cooling/warming therapies per order  - Administer medications as ordered  - Instruct and encourage patient and family to use good hand hygiene technique  - Identify and instruct in appropriate isolation precautions for identified infection/condition  Outcome: Progressing  Goal: Absence of fever/infection during neutropenic period  Description: INTERVENTIONS:  - Monitor WBC    Outcome: Progressing     Problem: SAFETY ADULT  Goal: Patient will remain free of falls  Description: INTERVENTIONS:  - Assess patient frequently for physical needs  -  Identify cognitive and physical deficits and behaviors that affect risk of falls    -  [place] fall precautions as indicated by assessment   - Educate patient/family on patient safety including physical limitations  - Instruct patient to call for assistance with activity based on assessment  - Modify environment to reduce risk of injury  - Consider OT/PT consult to assist with strengthening/mobility  Outcome: Progressing  Goal: Maintain or return to baseline ADL function  Description: INTERVENTIONS:  -  Assess patient's ability to carry out ADLs; assess patient's baseline for ADL function and identify physical deficits which impact ability to perform ADLs (bathing, care of mouth/teeth, toileting, grooming, dressing, etc )  - Assess/evaluate cause of self-care deficits   - Assess range of motion  - Assess patient's mobility; develop plan if impaired  - Assess patient's need for assistive devices and provide as appropriate  - Encourage maximum independence but intervene and supervise when necessary  - Involve family in performance of ADLs  - Assess for home care needs following discharge   - Consider OT consult to assist with ADL evaluation and planning for discharge  - Provide patient education as appropriate  Outcome: Progressing  Goal: Maintain or return mobility status to optimal level  Description: INTERVENTIONS:  - Assess patient's baseline mobility status (ambulation, transfers, stairs, etc )    - Identify cognitive and physical deficits and behaviors that affect mobility  - Identify mobility aids required to assist with transfers and/or ambulation (gait belt, sit-to-stand, lift, walker, cane, etc )  - [place] fall precautions as indicated by assessment  - Record patient progress and toleration of activity level on Mobility SBAR; progress patient to next Phase/Stage  - Instruct patient to call for assistance with activity based on assessment  - Consider rehabilitation consult to assist with strengthening/weightbearing, etc   Outcome: Progressing     Problem: DISCHARGE PLANNING  Goal: Discharge to home or other facility with appropriate resources  Description: INTERVENTIONS:  - Identify barriers to discharge w/patient and caregiver  - Arrange for needed discharge resources and transportation as appropriate  - Identify discharge learning needs (meds, wound care, etc )  - Arrange for interpretive services to assist at discharge as needed  - Refer to Case Management Department for coordinating discharge planning if the patient needs post-hospital services based on physician/advanced practitioner order or complex needs related to functional status, cognitive ability, or social support system  Outcome: Progressing     Problem: Knowledge Deficit  Goal: Patient/family/caregiver demonstrates understanding of disease process, treatment plan, medications, and discharge instructions  Description: Complete learning assessment and assess knowledge base    Interventions:  - Provide teaching at level of understanding  - Provide teaching via preferred learning methods  Outcome: Progressing     Problem: MUSCULOSKELETAL - ADULT  Goal: Maintain or return mobility to safest level of function  Description: INTERVENTIONS:  - Assess patient's ability to carry out ADLs; assess patient's baseline for ADL function and identify physical deficits which impact ability to perform ADLs (bathing, care of mouth/teeth, toileting, grooming, dressing, etc )  - Assess/evaluate cause of self-care deficits   - Assess range of motion  - Assess patient's mobility  - Assess patient's need for assistive devices and provide as appropriate  - Encourage maximum independence but intervene and supervise when necessary  - Involve family in performance of ADLs  - Assess for home care needs following discharge   - Consider OT consult to assist with ADL evaluation and planning for discharge  - Provide patient education as appropriate  Outcome: Progressing  Goal: Maintain proper alignment of affected body part  Description: INTERVENTIONS:  - Support, maintain and protect limb and body alignment  - Provide patient/ family with appropriate education  Outcome: Progressing

## 2020-08-09 NOTE — ASSESSMENT & PLAN NOTE
X ray finding of Comminuted distal humeral fracture   Pain medication  DVT prophylaxis  Orthopedic evaluation   PT/OT evaluation

## 2020-08-09 NOTE — ED NOTES
Medication requested from pharmacy at this time     Clary Bloom, [facility]  08/09/20 [id number]

## 2020-08-09 NOTE — ED PROVIDER NOTES
History  Chief Complaint   Patient presents with    Fall     Pt arrives to ED via EMS with complaint of a fall after she lost he balance causing a deformity to her left upper arm  Pt denies head strike, thinners, or LoC  HPI     Patient with left humeral fracture after a fall  Also hit her head  No loc but hematoma to left forehead  Severe pain in left  Upper arm  Worse with movement  Shortened humerus  No deformity to forarm or hand  No tenderness to forarm or hand  Reduction performed at bedside  Placed in coaptation splint  Initially post splinting xrays improved but CT worse  A second posterior splint was then placed and another reduction performed  Spoke with Dr Kristi Hensley and the patient  Patient feels unsafe going home, will admit  Humerus fracture reduced once before CT and then once again after arm CT scan  [facility] 72 yof will admit to medicine for pain control and ortho eval, patient required two reductions and a second splint placed  Prior to Admission Medications   Prescriptions Last Dose Informant Patient Reported? Taking?    ALPRAZolam (XANAX) 0 25 mg tablet   No Yes   Sig: Take 1 tablet (0 25 mg total) by mouth 3 (three) times a day as needed for anxiety   PARoxetine (PAXIL) 20 mg tablet   No Yes   Sig: Take 1 tablet (20 mg total) by mouth daily   acetaminophen (TYLENOL) 325 mg tablet   No Yes   Sig: Take 3 tablets (975 mg total) by mouth every 8 (eight) hours   cholecalciferol (VITAMIN D3) 1,000 units tablet   Yes Yes   Sig: Take 1,000 Units by mouth daily   docusate sodium (COLACE) 100 mg capsule Not Taking at Unknown time  No No   Sig: Take 1 capsule (100 mg total) by mouth 2 (two) times a day   Patient not taking: Reported on 5/12/2020   lidocaine (LIDODERM) 5 % Not Taking at Unknown time  No No   Sig: Place 1 patch on the skin daily Remove & Discard patch within 12 hours or as directed by MD   Patient not taking: Reported on 5/12/2020   lidocaine (LIDODERM) 5 % Not Taking at Unknown time  No No   Sig: Place 1 patch on the skin daily Remove & Discard patch within 12 hours or as directed by MD   Patient not taking: Reported on 5/12/2020   methocarbamol (ROBAXIN) 500 mg tablet Not Taking at Unknown time  No No   Sig: Take 1 tablet (500 mg total) by mouth every 6 (six) hours as needed for muscle spasms   Patient not taking: Reported on 5/12/2020   polyethylene glycol (MIRALAX) 17 g packet Not Taking at Unknown time  No No   Sig: Take 17 g by mouth daily   Patient not taking: Reported on 5/12/2020   senna (SENOKOT) 8 6 mg Past Month at Unknown time  No Yes   Sig: Take 1 tablet (8 6 mg total) by mouth daily at bedtime   tolterodine (DETROL) 1 mg tablet Not Taking at Unknown time  No No   Sig: Take 1 tablet (1 mg total) by mouth 2 (two) times a day   Patient not taking: Reported on 8/9/2020      Facility-Administered Medications: None       Past Medical History:   Diagnosis Date    Anxiety     Scoliosis        Past Surgical History:   Procedure Laterality Date    BACK SURGERY  1974    velarde damon in back for scoliosis    COLONOSCOPY  01/01/2013    JOINT REPLACEMENT      left hip and knee    KIDNEY STONE SURGERY         Family History   Problem Relation Age of Onset    Skin cancer Mother     Hypertension Father     Colon cancer Maternal Uncle      I have reviewed and agree with the history as documented  E-Cigarette/Vaping    E-Cigarette Use Never User      E-Cigarette/Vaping Substances     Social History     Tobacco Use    Smoking status: Never Smoker    Smokeless tobacco: Never Used   Substance Use Topics    Alcohol use: Yes     Frequency: Monthly or less     Drinks per session: 1 or 2     Comment: very rarely    Drug use: No       Review of Systems   Musculoskeletal: Positive for arthralgias  All other systems reviewed and are negative  Physical Exam  Physical Exam  Vitals signs and nursing note reviewed     Constitutional: Appearance: She is well-developed  HENT:      Head: Normocephalic  Comments: Left scalp hematoma     Right Ear: External ear normal       Left Ear: External ear normal    Eyes:      Conjunctiva/sclera: Conjunctivae normal    Neck:      Musculoskeletal: Normal range of motion and neck supple  Vascular: No JVD  Trachea: No tracheal deviation  Cardiovascular:      Rate and Rhythm: Normal rate and regular rhythm  Heart sounds: Normal heart sounds  Pulmonary:      Effort: Pulmonary effort is normal  No respiratory distress  Breath sounds: No wheezing or rales  Abdominal:      General: Bowel sounds are normal       Palpations: Abdomen is soft  Tenderness: There is no abdominal tenderness  There is no guarding or rebound  Musculoskeletal:         General: Tenderness, deformity and signs of injury present  Skin:     General: Skin is warm and dry  Findings: No erythema or rash  Neurological:      Mental Status: She is alert and oriented to person, place, and time  Psychiatric:         Thought Content:  Thought content normal          Vital Signs  ED Triage Vitals [08/09/20 0338]   Temperature Pulse Respirations Blood Pressure SpO2   98 6 °F (37 °C) 73 16 (!) 179/84 92 %      Temp Source Heart Rate Source Patient Position - Orthostatic VS BP Location FiO2 (%)   Oral Monitor Lying Right arm --      Pain Score       4           Vitals:    08/09/20 1100 08/09/20 1236 08/09/20 1559 08/09/20 2100   BP: 114/55 112/58 115/55 108/62   Pulse: 78 80 76 75   Patient Position - Orthostatic VS: Lying Lying Lying Lying         Visual Acuity      ED Medications  Medications   ondansetron (ZOFRAN) injection 4 mg (has no administration in time range)   enoxaparin (LOVENOX) subcutaneous injection 40 mg (40 mg Subcutaneous Given 8/9/20 0929)   morphine injection 2 mg (2 mg Intravenous Given 8/9/20 1519)   ALPRAZolam (XANAX) tablet 0 25 mg (0 25 mg Oral Given 8/9/20 1023)   cholecalciferol (VITAMIN D3) tablet 1,000 Units (1,000 Units Oral Given 8/9/20 1024)   docusate sodium (COLACE) capsule 100 mg (100 mg Oral Given 8/9/20 1745)   methocarbamol (ROBAXIN) tablet 500 mg (500 mg Oral Given 8/9/20 1900)   PARoxetine (PAXIL) tablet 20 mg (20 mg Oral Given 8/9/20 1023)   senna (SENOKOT) tablet 8 6 mg (8 6 mg Oral Not Given 8/9/20 2155)   oxybutynin (DITROPAN) tablet 5 mg (5 mg Oral Given 8/9/20 1137)   polyethylene glycol (MIRALAX) packet 17 g (17 g Oral Not Given 8/9/20 1025)   acetaminophen (TYLENOL) tablet 975 mg (975 mg Oral Given 8/9/20 1745)   fentanyl citrate (PF) (FOR EMS ONLY) 100 mcg/2 mL injection 100 mcg (0 mcg Does not apply Given to EMS 8/9/20 0347)   fentanyl citrate (PF) 100 MCG/2ML 100 mcg (100 mcg Intravenous Given 8/9/20 0502)   bupivacaine (PF) (MARCAINE) 0 25 % injection 30 mL (30 mL Infiltration Given by Other 8/9/20 0541)   morphine (PF) 4 mg/mL injection 4 mg (4 mg Intravenous Given 8/9/20 0915)       Diagnostic Studies  Results Reviewed     Procedure Component Value Units Date/Time    CBC and differential [[id number]]  (Abnormal) Collected:  08/09/20 0847    Lab Status:  Final result Specimen:  Blood from Arm, Right Updated:  08/09/20 0953     WBC 13 58 Thousand/uL      RBC 4 35 Million/uL      Hemoglobin 13 4 g/dL      Hematocrit 41 6 %      MCV 96 fL      MCH 30 8 pg      MCHC 32 2 g/dL      RDW 13 6 %      MPV 10 5 fL      Platelets 183 Thousands/uL      nRBC 0 /100 WBCs     Narrative: This is an appended report  These results have been appended to a previously verified report      Basic metabolic panel [[id number]]  (Abnormal) Collected:  08/09/20 0847    Lab Status:  Final result Specimen:  Blood from Arm, Right Updated:  08/09/20 0903     Sodium 140 mmol/L      Potassium 4 2 mmol/L      Chloride 105 mmol/L      CO2 30 mmol/L      ANION GAP 5 mmol/L      BUN 18 mg/dL      Creatinine 0 35 mg/dL      Glucose 126 mg/dL      Calcium 8 6 mg/dL      eGFR 115 ml/min/1 73sq m Narrative:       National Kidney Disease Foundation guidelines for Chronic Kidney Disease (CKD):     Stage 1 with normal or high GFR (GFR > 90 mL/min/1 73 square meters)    Stage 2 Mild CKD (GFR = 60-89 mL/min/1 73 square meters)    Stage 3A Moderate CKD (GFR = 45-59 mL/min/1 73 square meters)    Stage 3B Moderate CKD (GFR = 30-44 mL/min/1 73 square meters)    Stage 4 Severe CKD (GFR = 15-29 mL/min/1 73 square meters)    Stage 5 End Stage CKD (GFR <15 mL/min/1 73 square meters)  Note: GFR calculation is accurate only with a steady state creatinine    Platelet count [[id number]]     Lab Status:  No result Specimen:  Blood                  CT upper extremity wo contrast left   Final Result by Nicole Gutierrez MD (27/81 1789)      1  Comminuted distal humeral fracture as described  One of the fracture fragments is in the vicinity of the radial nerve (not distinctly visualize)   2  Left renal stones   3  Enlarged pulmonary arteries, can be seen with pulmonary artery hypertension         Workstation performed: [id number]         XR humerus LEFT   Final Result by Dineen Denver, MD (08/09 0921)      Reduction and casting      Workstation performed: [id number]         CT head without contrast   Final Result by Jannette Decker DO (08/09 0453)   1  Cerebral atrophy with chronic small vessel ischemic white matter disease  No acute intracranial abnormality  2   Left supraorbital and left frontal soft tissue swelling  Underlying bony calvarium intact  Workstation performed: [id number]         CT cervical spine without contrast   Final Result by Jannette Decker DO (08/09 0260)   Mild degenerative changes  No acute cervical spine fracture or traumatic malalignment  Workstation performed: [id number]         XR humerus LEFT   Final Result by Charis Foley DO (08/09 5238)      Comminuted, displaced and mildly angulated fracture distal humerus        Workstation performed: [id number]                    Procedures  Orthopedic injury treatment    Date/Time: 8/9/2020 5:41 AM  Performed by: Patricia Ramos MD  Authorized by: Patricia Ramos MD     Patient Location:  ED  Verbal consent obtained?: Yes    Consent given by:  Patient  Patient identity confirmed:  Verbally with patient  Injury location:  Upper arm  Location details:  Left upper arm  Injury type:  Fracture  Fracture type: humeral shaft    Distal perfusion: normal    Neurological function: normal    Range of motion: reduced    Local anesthesia used?: Yes    Anesthesia:  Hematoma block  Local anesthetic:  Bupivacaine 0 25% without epinephrine  Anesthetic total (ml):  20  Manipulation performed?: Yes    Skin traction used?: No    Skeletal traction used?: Yes    Reduction successful?: Yes    Immobilization:  Splint  Splint type: coaptation splint and then posterior  Distal perfusion: normal    Neurological function: normal    Patient tolerance:  Patient tolerated the procedure well with no immediate complications  Orthopedic injury treatment    Date/Time: 8/9/2020 10:43 PM  Performed by: Patricia Ramos MD  Authorized by: Patricia Ramos MD     Patient identity confirmed:  Verbally with patient  Injury location:  Upper arm  Injury type:  Fracture  Fracture type: humeral shaft    Distal perfusion: normal    Neurological function: normal    Local anesthesia used?: No    Manipulation performed?: Yes (a second splint was placed during a separate procedure, skeletal traction once again applied resulting in a second reduction)    Skeletal traction used?: Yes    Reduction successful?: Yes    Immobilization:  Splint  Splint type: posterior arm splint  Distal perfusion: normal    Neurological function: normal               ED Course       US AUDIT      Most Recent Value   Initial Alcohol Screen: US AUDIT-C    1  How often do you have a drink containing alcohol?  0 Filed at: 08/09/2020 0540   2   How many drinks containing alcohol do you have on a typical day you are drinking? 0 Filed at: 08/09/2020 0540   3a  Male UNDER 65: How often do you have five or more drinks on one occasion? 0 Filed at: 08/09/2020 0540   3b  FEMALE Any Age, or MALE 65+: How often do you have 4 or more drinks on one occassion? 0 Filed at: 08/09/2020 0540   Audit-C Score  0 Filed at: 08/09/2020 0540                  ONUR/DAST-10      Most Recent Value   How many times in the past year have you    Used an illegal drug or used a prescription medication for non-medical reasons? Never Filed at: 08/09/2020 0540                                MDM      Disposition  Final diagnoses:   Humerus fracture     Time reflects when diagnosis was documented in both MDM as applicable and the Disposition within this note     Time User Action Codes Description Comment    8/9/2020  7:19 AM Franc AN Add [B15 [id number]] Humerus fracture       ED Disposition     ED Disposition Condition Date/Time Comment    Discharge Stable Sun Aug 9, 2020  7:19 AM Ilda Boswell discharge to home/self care  Follow-up Information     Follow up With Specialties Details Why Contact Info    Heath Holman MD Orthopedic Surgery, Hand Surgery In 2 days  [address]  [place]   [address]  [phone]            Current Discharge Medication List      START taking these medications    Details   !! acetaminophen (TYLENOL) 325 mg tablet Take 2 tablets (650 mg total) by mouth every 6 (six) hours as needed for mild pain for up to 5 days  Qty: 30 tablet, Refills: 0    Associated Diagnoses: Humerus fracture      morphine (MSIR) 15 mg tablet Take 1 tablet (15 mg total) by mouth every 8 (eight) hours as needed for severe pain for up to 10 dosesMax Daily Amount: 45 mg  Qty: 10 tablet, Refills: 0    Associated Diagnoses: Humerus fracture       !! - Potential duplicate medications found  Please discuss with provider        CONTINUE these medications which have NOT CHANGED    Details   !! acetaminophen (TYLENOL) 325 mg tablet Take 3 tablets (975 mg total) by mouth every 8 (eight) hours  Qty: 30 tablet, Refills: 0    Associated Diagnoses: Closed bilateral fracture of pubic rami, initial encounter ([facility])      ALPRAZolam (XANAX) 0 25 mg tablet Take 1 tablet (0 25 mg total) by mouth 3 (three) times a day as needed for anxiety  Qty: 60 tablet, Refills: 0    Associated Diagnoses: Anxiety      cholecalciferol (VITAMIN D3) 1,000 units tablet Take 1,000 Units by mouth daily      PARoxetine (PAXIL) 20 mg tablet Take 1 tablet (20 mg total) by mouth daily  Qty: 30 tablet, Refills: 3    Associated Diagnoses: Anxiety      senna (SENOKOT) 8 6 mg Take 1 tablet (8 6 mg total) by mouth daily at bedtime  Qty: 120 each, Refills: 0    Associated Diagnoses: Constipation      docusate sodium (COLACE) 100 mg capsule Take 1 capsule (100 mg total) by mouth 2 (two) times a day  Qty: 10 capsule, Refills: 0    Associated Diagnoses: Constipation      !! lidocaine (LIDODERM) 5 % Place 1 patch on the skin daily Remove & Discard patch within 12 hours or as directed by MD  Qty: 3 patch, Refills: 0    Associated Diagnoses: Closed bilateral fracture of pubic rami, initial encounter ([facility] [address] )      ! ! lidocaine (LIDODERM) 5 % Place 1 patch on the skin daily Remove & Discard patch within 12 hours or as directed by MD  Qty: 3 patch, Refills: 0    Associated Diagnoses: Closed bilateral fracture of pubic rami, initial encounter ([facility])      methocarbamol (ROBAXIN) 500 mg tablet Take 1 tablet (500 mg total) by mouth every 6 (six) hours as needed for muscle spasms  Refills: 0    Associated Diagnoses: Closed bilateral fracture of pubic rami, initial encounter ([facility])      polyethylene glycol (MIRALAX) 17 g packet Take 17 g by mouth daily  Qty: 14 each, Refills: 0    Associated Diagnoses: Constipation      tolterodine (DETROL) 1 mg tablet Take 1 tablet (1 mg total) by mouth 2 (two) times a day  Qty: 60 tablet, Refills: 3    Associated Diagnoses: Incontinence in female       !! - Potential duplicate medications found  Please discuss with provider  No discharge procedures on file      PDMP Review       Value Time User    PDMP Reviewed  Yes 8/6/2020  5:26 PM Aniyah Driver MD          ED Provider  Electronically Signed by           Pili Gardner MD  08/09/20 5610

## 2020-08-09 NOTE — H&P
H&P- Ilda Boswell 1955, 72 y o  female MRN: [id number]    Unit/Bed#: ED 26 Encounter: [id number]    Primary Care Provider: Nicolas Douglas MD   Date and time admitted to hospital: 8/9/2020  3:36 AM        Closed fracture of distal end of humerus  Assessment & Plan  X ray finding of Comminuted distal humeral fracture   Pain medication  DVT prophylaxis  Orthopedic evaluation               VTE Prophylaxis: Enoxaparin (Lovenox)  / sequential compression device   Code Status: full code  POLST: There is no POLST form on file for this patient (pre-hospital)  Discussion with family: spouse    Anticipated Length of Stay:  Patient will be admitted on an Emergency basis with an anticipated length of stay of  <2 midnights  Justification for Hospital Stay: Left humerus fracture on pain medication     Total Time for Visit, including Counseling / Coordination of Care: 1 hour  Greater than 50% of this total time spent on direct patient counseling and coordination of care  Chief Complaint:   Fall     History of Present Illness:    Ilda Boswell is a 72 y o  female who presents with left elbow  deformity of left side after she fall today  Patient has significant past medical history of urinary incontinence  Patient stated she was walking and she was going to  something then after she fall  She denies any symptoms prior to for such as short of breath, palpitation, headache, dizziness, change in vision  Review of Systems:    Review of Systems   Constitutional: Negative for activity change and appetite change  HENT: Negative for congestion, dental problem and drooling  Respiratory: Negative for apnea, cough, choking and chest tightness  Cardiovascular: Negative for chest pain, palpitations and leg swelling  Gastrointestinal: Negative for abdominal distention and abdominal pain  Genitourinary: Negative  Negative for difficulty urinating, dyspareunia, dysuria, enuresis and flank pain  Musculoskeletal: Positive for joint swelling  Skin: Negative for pallor and rash  Psychiatric/Behavioral: Negative for agitation and behavioral problems  Past Medical and Surgical History:     Past Medical History:   Diagnosis Date    Anxiety     Scoliosis        Past Surgical History:   Procedure Laterality Date    BACK SURGERY  1974    velarde damon in back for scoliosis    COLONOSCOPY  01/01/2013    JOINT REPLACEMENT      left hip and knee    KIDNEY STONE SURGERY         Meds/Allergies:    Prior to Admission medications    Medication Sig Start Date End Date Taking?  Authorizing Provider   acetaminophen (TYLENOL) 325 mg tablet Take 3 tablets (975 mg total) by mouth every 8 (eight) hours 8/21/18   Luis Shetty MD   acetaminophen (TYLENOL) 325 mg tablet Take 2 tablets (650 mg total) by mouth every 6 (six) hours as needed for mild pain for up to 5 days 8/9/20 8/14/20  Patricia Ramos MD   ALPRAZolam Fernandez Anchors) 0 25 mg tablet Take 1 tablet (0 25 mg total) by mouth 3 (three) times a day as needed for anxiety 8/6/20   Tiffanie Packer MD   cholecalciferol (VITAMIN D3) 1,000 units tablet Take 1,000 Units by mouth daily    Historical Provider, MD   docusate sodium (COLACE) 100 mg capsule Take 1 capsule (100 mg total) by mouth 2 (two) times a day  Patient not taking: Reported on 5/12/2020 8/21/18   Luis Shetty MD   lidocaine (LIDODERM) 5 % Place 1 patch on the skin daily Remove & Discard patch within 12 hours or as directed by MD  Patient not taking: Reported on 5/12/2020 8/22/18   Luis Shetty MD   lidocaine (LIDODERM) 5 % Place 1 patch on the skin daily Remove & Discard patch within 12 hours or as directed by MD  Patient not taking: Reported on 5/12/2020 8/22/18   Luis Shetty MD   methocarbamol (ROBAXIN) 500 mg tablet Take 1 tablet (500 mg total) by mouth every 6 (six) hours as needed for muscle spasms  Patient not taking: Reported on 5/12/2020 8/21/18   Luis Shetty MD   morphine (MSIR) 15 mg tablet Take 1 tablet (15 mg total) by mouth every 8 (eight) hours as needed for severe pain for up to 10 dosesMax Daily Amount: 45 mg 8/9/20   Pili Gardner MD   PARoxetine (PAXIL) 20 mg tablet Take 1 tablet (20 mg total) by mouth daily 7/15/20   Aniyah Driver MD   polyethylene glycol (MIRALAX) 17 g packet Take 17 g by mouth daily  Patient not taking: Reported on 5/12/2020 8/22/18   Haylee Smith MD   senna (SENOKOT) 8 6 mg Take 1 tablet (8 6 mg total) by mouth daily at bedtime  Patient not taking: Reported on 5/12/2020 8/21/18   Haylee Smith MD   tolterodine (DETROL) 1 mg tablet Take 1 tablet (1 mg total) by mouth 2 (two) times a day 5/15/20   Aniyah Driver MD     I have reviewed home medications with patient personally  Allergies: Allergies   Allergen Reactions    Macrobid [Nitrofurantoin]        Social History:     Marital Status: /Civil Union   Occupation:   Patient Pre-hospital Living Situation:   Patient Pre-hospital Level of Mobility:   Patient Pre-hospital Diet Restrictions:   Substance Use History:   Social History     Substance and Sexual Activity   Alcohol Use Yes    Frequency: Monthly or less    Drinks per session: 1 or 2    Comment: very rarely     Social History     Tobacco Use   Smoking Status Never Smoker   Smokeless Tobacco Never Used     Social History     Substance and Sexual Activity   Drug Use No       Family History:    non-contributory    Physical Exam:     Vitals:   Blood Pressure: 128/68 (08/09/20 0815)  Pulse: 82 (08/09/20 0815)  Temperature: 98 6 °F (37 °C) (08/09/20 0338)  Temp Source: Oral (08/09/20 0338)  Respirations: 18 (08/09/20 0815)  Height: 5' 1" (154 9 cm) (08/09/20 0338)  Weight - Scale: 62 kg (136 lb 11 oz) (08/09/20 0338)  SpO2: 95 % (08/09/20 0815)    Physical Exam  Constitutional:       General: She is not in acute distress  Appearance: Normal appearance  Neck:      Musculoskeletal: Normal range of motion     Cardiovascular: Rate and Rhythm: Normal rate and regular rhythm  Pulses: Normal pulses  Heart sounds: Normal heart sounds  Pulmonary:      Effort: Pulmonary effort is normal  No respiratory distress  Breath sounds: Normal breath sounds  Abdominal:      General: Abdomen is flat  Bowel sounds are normal    Musculoskeletal:         General: Swelling and deformity present  Right shoulder: She exhibits tenderness, swelling and effusion  Left elbow: She exhibits decreased range of motion, swelling, effusion and deformity  Arms:    Neurological:      General: No focal deficit present  Mental Status: She is alert  Psychiatric:         Mood and Affect: Mood normal              Additional Data:     Lab Results: I have personally reviewed pertinent reports  Imaging: I have personally reviewed pertinent reports  CT upper extremity wo contrast left   Final Result by Bc Dan MD (20/20 4611)      1  Comminuted distal humeral fracture as described  One of the fracture fragments is in the vicinity of the radial nerve (not distinctly visualize)   2  Left renal stones   3  Enlarged pulmonary arteries, can be seen with pulmonary artery hypertension         Workstation performed: [id number]         CT head without contrast   Final Result by Alessandro Montes DO (08/09 0453)   1  Cerebral atrophy with chronic small vessel ischemic white matter disease  No acute intracranial abnormality  2   Left supraorbital and left frontal soft tissue swelling  Underlying bony calvarium intact  Workstation performed: [id number]         CT cervical spine without contrast   Final Result by Alessandro Montes DO (08/09 2037)   Mild degenerative changes  No acute cervical spine fracture or traumatic malalignment                     Workstation performed: [id number]         XR humerus LEFT    (Results Pending)   XR humerus LEFT    (Results Pending) EKG, Pathology, and Other Studies Reviewed on Admission:   · EKG:    Allscripts / Epic Records Reviewed: Yes     ** Please Note: This note has been constructed using a voice recognition system   **

## 2020-08-10 PROBLEM — K59.00 CONSTIPATION: Status: ACTIVE | Noted: 2020-08-10

## 2020-08-10 LAB
ALBUMIN SERPL BCP-MCNC: 3.2 G/DL (ref 3.5–5)
ALP SERPL-CCNC: 50 U/L (ref 46–116)
ALT SERPL W P-5'-P-CCNC: 22 U/L (ref 12–78)
ANION GAP SERPL CALCULATED.3IONS-SCNC: 6 MMOL/L (ref 4–13)
AST SERPL W P-5'-P-CCNC: 19 U/L (ref 5–45)
BILIRUB SERPL-MCNC: 0.6 MG/DL (ref 0.2–1)
BUN SERPL-MCNC: 21 MG/DL (ref 5–25)
CALCIUM SERPL-MCNC: 8.4 MG/DL (ref 8.3–10.1)
CHLORIDE SERPL-SCNC: 105 MMOL/L (ref 100–108)
CO2 SERPL-SCNC: 27 MMOL/L (ref 21–32)
CREAT SERPL-MCNC: 0.39 MG/DL (ref 0.6–1.3)
GFR SERPL CREATININE-BSD FRML MDRD: 111 ML/MIN/1.73SQ M
GLUCOSE SERPL-MCNC: 119 MG/DL (ref 65–140)
MAGNESIUM SERPL-MCNC: 2.2 MG/DL (ref 1.6–2.6)
POTASSIUM SERPL-SCNC: 3.7 MMOL/L (ref 3.5–5.3)
PROT SERPL-MCNC: 6.4 G/DL (ref 6.4–8.2)
SARS-COV-2 RNA RESP QL NAA+PROBE: NEGATIVE
SODIUM SERPL-SCNC: 138 MMOL/L (ref 136–145)

## 2020-08-10 PROCEDURE — 87635 SARS-COV-2 COVID-19 AMP PRB: CPT | Performed by: PHYSICIAN ASSISTANT

## 2020-08-10 PROCEDURE — 99223 1ST HOSP IP/OBS HIGH 75: CPT | Performed by: ORTHOPAEDIC SURGERY

## 2020-08-10 PROCEDURE — 80053 COMPREHEN METABOLIC PANEL: CPT | Performed by: FAMILY MEDICINE

## 2020-08-10 PROCEDURE — 99232 SBSQ HOSP IP/OBS MODERATE 35: CPT | Performed by: PHYSICIAN ASSISTANT

## 2020-08-10 PROCEDURE — 83735 ASSAY OF MAGNESIUM: CPT | Performed by: FAMILY MEDICINE

## 2020-08-10 RX ORDER — HYDROMORPHONE HCL/PF 1 MG/ML
0.5 SYRINGE (ML) INJECTION EVERY 4 HOURS PRN
Status: DISCONTINUED | OUTPATIENT
Start: 2020-08-10 | End: 2020-08-13 | Stop reason: HOSPADM

## 2020-08-10 RX ORDER — OXYCODONE HYDROCHLORIDE 5 MG/1
5 TABLET ORAL EVERY 4 HOURS PRN
Status: DISCONTINUED | OUTPATIENT
Start: 2020-08-10 | End: 2020-08-10

## 2020-08-10 RX ORDER — OXYCODONE HYDROCHLORIDE 5 MG/1
5 TABLET ORAL EVERY 4 HOURS PRN
Status: DISCONTINUED | OUTPATIENT
Start: 2020-08-10 | End: 2020-08-13 | Stop reason: HOSPADM

## 2020-08-10 RX ORDER — OXYCODONE HYDROCHLORIDE 5 MG/1
2.5 TABLET ORAL EVERY 4 HOURS PRN
Status: DISCONTINUED | OUTPATIENT
Start: 2020-08-10 | End: 2020-08-13 | Stop reason: HOSPADM

## 2020-08-10 RX ORDER — SENNOSIDES 8.6 MG
1 TABLET ORAL 2 TIMES DAILY
Status: DISCONTINUED | OUTPATIENT
Start: 2020-08-10 | End: 2020-08-13 | Stop reason: HOSPADM

## 2020-08-10 RX ORDER — OXYCODONE HYDROCHLORIDE 10 MG/1
10 TABLET ORAL EVERY 4 HOURS PRN
Status: DISCONTINUED | OUTPATIENT
Start: 2020-08-10 | End: 2020-08-10

## 2020-08-10 RX ADMIN — DOCUSATE SODIUM 100 MG: 100 CAPSULE, LIQUID FILLED ORAL at 08:59

## 2020-08-10 RX ADMIN — METHOCARBAMOL TABLETS 500 MG: 500 TABLET, COATED ORAL at 23:50

## 2020-08-10 RX ADMIN — PAROXETINE 20 MG: 20 TABLET, FILM COATED ORAL at 08:59

## 2020-08-10 RX ADMIN — ACETAMINOPHEN 975 MG: 325 TABLET, FILM COATED ORAL at 08:59

## 2020-08-10 RX ADMIN — Medication 1000 UNITS: at 08:57

## 2020-08-10 RX ADMIN — DOCUSATE SODIUM 100 MG: 100 CAPSULE, LIQUID FILLED ORAL at 16:31

## 2020-08-10 RX ADMIN — METHOCARBAMOL TABLETS 500 MG: 500 TABLET, COATED ORAL at 01:21

## 2020-08-10 RX ADMIN — METHOCARBAMOL TABLETS 500 MG: 500 TABLET, COATED ORAL at 08:59

## 2020-08-10 RX ADMIN — ACETAMINOPHEN 975 MG: 325 TABLET, FILM COATED ORAL at 01:20

## 2020-08-10 RX ADMIN — OXYCODONE HYDROCHLORIDE 5 MG: 5 TABLET ORAL at 09:41

## 2020-08-10 RX ADMIN — METHOCARBAMOL TABLETS 500 MG: 500 TABLET, COATED ORAL at 16:29

## 2020-08-10 RX ADMIN — OXYCODONE HYDROCHLORIDE 5 MG: 5 TABLET ORAL at 18:12

## 2020-08-10 RX ADMIN — OXYCODONE HYDROCHLORIDE 5 MG: 5 TABLET ORAL at 13:49

## 2020-08-10 RX ADMIN — STANDARDIZED SENNA CONCENTRATE 8.6 MG: 8.6 TABLET ORAL at 09:41

## 2020-08-10 RX ADMIN — OXYBUTYNIN CHLORIDE 5 MG: 5 TABLET ORAL at 08:59

## 2020-08-10 RX ADMIN — MORPHINE SULFATE 2 MG: 2 INJECTION, SOLUTION INTRAMUSCULAR; INTRAVENOUS at 04:20

## 2020-08-10 RX ADMIN — ACETAMINOPHEN 975 MG: 325 TABLET, FILM COATED ORAL at 16:30

## 2020-08-10 RX ADMIN — ENOXAPARIN SODIUM 40 MG: 40 INJECTION SUBCUTANEOUS at 08:59

## 2020-08-10 RX ADMIN — OXYCODONE HYDROCHLORIDE 5 MG: 5 TABLET ORAL at 22:29

## 2020-08-10 RX ADMIN — STANDARDIZED SENNA CONCENTRATE 8.6 MG: 8.6 TABLET ORAL at 16:31

## 2020-08-10 NOTE — SOCIAL WORK
LOS 0  Patient is not a bundle or a readmission  CM spoke with patient at the bedside  CM introduced self and role  Patient lives in [place] with her  in a ranch style home  There are 2 JJ home  Patient uses 2 straight canes at home  Patient also has a roller walker, bedside commode, shower chair, wheelchair and recliner at home  Patient is independent with ADLS  Patient's  helps at home  Patient has a history of [facility] in the past, unable to remember agency name  Patient has an inpatient rehab history at [facility]  Patient uses [facility] for short term prescriptions  Patient uses mail order with her insurance for longer term medications  Patient does not have an advance directive/living will  Patient's  Walter Ochoa is her emergency contact, [phone]  Patient is retired/disabled and currently drives  Patient denies history of drug/alcohol abuse  Patient has a history of depression/anxiety  Patient's PCP is Dr Yeimi Trejo  Patient's  is able to transport at discharge  CM spoke with patient at the bedside re:DCP  Patient stated she is scheduled for surgery tomorrow 8/11/2020 at 1500  CM discussed PT/OT post op  Patient adamantly refusing inpatient rehab  Patient stated "going to [facility] was the worst decision, not again " CM discussed following up together post op to further discuss any discharge needs  No other questions/concerns noted at this time  CM reviewed discharge planning process including the following: identifying caregivers at home, preference for d/c planning needs,   availability of [facility] program, availability of treatment team to discuss questions or concerns patient and/or family may have regarding diagnosis, plan of care, old or new medications and discharge planning   CM will continue to follow for care coordination and update assessment as appropriate

## 2020-08-10 NOTE — UTILIZATION REVIEW
Initial Clinical Review    Admission: Date/Time/Statement:   8/9/2020 0852 Observation AND  CHANGED 8/10/2020 1125 TO INPATIENT RE: PATIENT REQUIRING IV ANALGESIA FOR PAIN WITH COMMINUTED DISTAL HUMERUS FRACTURE WHICH NOW REQUIRES ORIF  Start    Ordered    08/10/20 1125    Inpatient Admission  Once      Transfer Service: Hospitalist       Question  Answer  Comment    Admitting Physician  Екатерина Hernandez     Level of Care  Med Surg     Estimated length of stay  More than 2 Midnights     Certification  I certify that inpatient services are medically necessary for this patient for a duration of greater than two midnights  See H&P and MD Progress Notes for additional information about the patient's course of treatment  08/10/20 1125       ED Arrival Information     Expected Arrival Acuity Means of Arrival Escorted By Service Admission Type    - 8/9/2020 03:36 Urgent Ambulance [address] Urgent    Arrival Complaint    Fall-arm injury        Chief Complaint   Patient presents with    Fall     Pt arrives to ED via EMS with complaint of a fall after she lost he balance causing a deformity to her left upper arm  Pt denies head strike, thinners, or LoC  Assessment/Plan: this is a 72year old female from home to ED via ems admitted to observation [address]  due to closed fracture of distal end of humerus  Presented with severe pain in left upper arm after a fall where also struck head  On exam has left scalp hematoma  Tenderness deformity  Left arm  Wbc 13 58  CT cervical spine with degenerative changes  CT head with soft tissue swelling  CT LUE showed comminuted distal humeral fracture  In the ED, reduced fracture and placed in splint  Pain control in progress  Orthopedics consulted       ON 8/10/2020 CHANGED TO INPATIENT: Patient with ongoing pain despite scheduled tylenol, has used 3 doses of IV analgesia since arrival to floor and medication changed form Morphine to Dilaudid today  Will need ORIF  Per orthopedics 8/10/2020- has left distal humerus fracture and plan surgery 8/11/2020 ORIF left distal humerus  ED Triage Vitals [08/09/20 0338]   Temperature Pulse Respirations Blood Pressure SpO2   98 6 °F (37 °C) 73 16 (!) 179/84 92 %      Temp Source Heart Rate Source Patient Position - Orthostatic VS BP Location FiO2 (%)   Oral Monitor Lying Right arm --      Pain Score       4          Wt Readings from Last 1 Encounters:   08/09/20 62 kg (136 lb 11 oz)     Additional Vital Signs:   08/10/20 0700   98 6 °F (37 °C)   84   18   116/56   88   94 %   None (Room air)   Lying    08/09/20 2300            112/57            Lying    08/09/20 2100   98 6 °F (37 °C)   75      108/62       94 %   None (Room air)   Lying    BP: automatic 97/54 at 08/09/20 2100    08/09/20 1559   99 4 °F (37 4 °C)   76   16   115/55   79   93 %   None (Room air)   Lying    08/09/20 1236   98 3 °F (36 8 °C)   80   18   112/58      92 %   None (Room air         Pertinent Labs/Diagnostic Test Results:   8/9/2020 Xray left humerus Comminuted, displaced and mildly angulated fracture distal humerus  8/9/2020 CT head Cerebral atrophy with chronic small vessel ischemic white matter disease  No acute intracranial abnormality  2   Left supraorbital and left frontal soft tissue swelling  Underlying bony calvarium intact  8/9/2020 CT cervical spine Mild degenerative changes    No acute cervical spine fracture or traumatic malalignment  8/9/2020 Xray left humerus  Reduction and casting    8/9/2020 CT left upper extremity Comminuted distal humeral fracture as described  One of the fracture fragments is in the vicinity of the radial nerve (not distinctly visualize)  2  Left renal stones  3    Enlarged pulmonary arteries, can be seen with pulmonary artery hypertension      Results from last 7 days   Lab Units 08/09/20  0847   WBC Thousand/uL 13 58*   HEMOGLOBIN g/dL 13 4   HEMATOCRIT % 41 6 PLATELETS Thousands/uL 215         Results from last 7 days   Lab Units 08/10/20  0647 08/09/20  0847   SODIUM mmol/L 138 140   POTASSIUM mmol/L 3 7 4 2   CHLORIDE mmol/L 105 105   CO2 mmol/L 27 30   ANION GAP mmol/L 6 5   BUN mg/dL 21 18   CREATININE mg/dL 0 39* 0 35*   EGFR ml/min/1 73sq m 111 115   CALCIUM mg/dL 8 4 8 6   MAGNESIUM mg/dL 2 2  --      Results from last 7 days   Lab Units 08/10/20  0647   AST U/L 19   ALT U/L 22   ALK PHOS U/L 50   TOTAL PROTEIN g/dL 6 4   ALBUMIN g/dL 3 2*   TOTAL BILIRUBIN mg/dL 0 60         Results from last 7 days   Lab Units 08/10/20  0647 08/09/20  0847   GLUCOSE RANDOM mg/dL 119 126     Results from last 7 days   Lab Units 08/09/20  0847   TOTAL COUNTED  100       ED Treatment:   Medication Administration from 08/09/2020 0336 to 08/09/2020 1235       Date/Time Order Dose Route Action Comments     08/09/2020 0347 fentanyl citrate (PF) (FOR EMS ONLY) 100 mcg/2 mL injection 100 mcg 0 mcg Does not apply Given to EMS      08/09/2020 0502 fentanyl citrate (PF) 100 MCG/2ML 100 mcg 100 mcg Intravenous Given 50 MCG GIVEN AT THIS TIME; WILL GIVE ADDITIONAL 50 MCG IF NEEDED     08/09/2020 0541 bupivacaine (PF) (MARCAINE) 0 25 % injection 30 mL 30 mL Infiltration Given by Other Given by Dr Christoph Herring     08/09/2020 0929 enoxaparin (LOVENOX) subcutaneous injection 40 mg 40 mg Subcutaneous Given      08/09/2020 1023 ALPRAZolam (XANAX) tablet 0 25 mg 0 25 mg Oral Given      08/09/2020 1024 cholecalciferol (VITAMIN D3) tablet 1,000 Units 1,000 Units Oral Given      08/09/2020 1023 docusate sodium (COLACE) capsule 100 mg 100 mg Oral Given      08/09/2020 1023 PARoxetine (PAXIL) tablet 20 mg 20 mg Oral Given      08/09/2020 1137 oxybutynin (DITROPAN) tablet 5 mg 5 mg Oral Given      08/09/2020 1023 acetaminophen (TYLENOL) tablet 975 mg 975 mg Oral Given      08/09/2020 0915 morphine (PF) 4 mg/mL injection 4 mg 4 mg Intravenous Given         Past Medical History:   Diagnosis Date    Anxiety  Scoliosis      Present on Admission:   Mixed anxiety and depressive disorder      Admitting Diagnosis: Arm injury [S49 90XA]  Humerus fracture [S42 309A]  Age/Sex: 72 y o  female  Admission Orders: 8/9/2020 0852 Observation AND CHANGED TO INPATIENT 8/10/2020 1125  Scheduled Medications:  acetaminophen, 975 mg, Oral, Q8H  cholecalciferol, 1,000 Units, Oral, Daily  docusate sodium, 100 mg, Oral, BID  enoxaparin, 40 mg, Subcutaneous, Daily  oxybutynin, 5 mg, Oral, Daily  PARoxetine, 20 mg, Oral, Daily  polyethylene glycol, 17 g, Oral, Daily  senna, 1 tablet, Oral, HS      Continuous IV Infusions:none     PRN Meds:  ALPRAZolam, 0 25 mg, Oral, TID PRN - used x 1   methocarbamol, 500 mg, Oral, Q6H PRN - used x 2  morphine injection, 2 mg, Intravenous, Q4H PRN - used x 3 (6369, 8445, 0420)  ondansetron, 4 mg, Intravenous, Q6H PRN  HYDROmorphone (DILAUDID) injection 0 5 mg    Dose: 0 5 mg  Freq: Every 4 hours PRN Route: IV  PRN Reason: breakthrough pain  Start: 08/10/20 0915       IP CONSULT TO ORTHOPEDIC SURGERY    Network Utilization Review Department  [email or web address]  ATTENTION: Please call with any questions or concerns to [phone] and carefully listen to the prompts so that you are directed to the right person  All voicemails are confidential   Ty Limb all requests for admission clinical reviews, approved or denied determinations and any other requests to dedicated fax number below belonging to the campus where the patient is receiving treatment   List of dedicated fax numbers for the Facilities:  FACILITY NAME UR FAX NUMBER   ADMISSION DENIALS (Administrative/Medical Necessity) [phone]   [address]  (Maternity/NICU/Pediatrics) [phone]   Silvestre Lyons [phone]   Joana Guadarrama [phone]   Conchita Lugo [phone]   [facility] [address] [facility] [phone] [address] [facility]  [phone]   [address] [place] [address] [facility] [phone]

## 2020-08-10 NOTE — PLAN OF CARE
Problem: Potential for Falls  Goal: Patient will remain free of falls  Description: INTERVENTIONS:  - Assess patient frequently for physical needs  -  Identify cognitive and physical deficits and behaviors that affect risk of falls    -  Moran fall precautions as indicated by assessment   - Educate patient/family on patient safety including physical limitations  - Instruct patient to call for assistance with activity based on assessment  - Modify environment to reduce risk of injury  - Consider OT/PT consult to assist with strengthening/mobility  Outcome: Progressing

## 2020-08-10 NOTE — CONSULTS
Consult Note - Orthopedics   Evelin Vasquez 72 y o  female MRN: [id number]  Unit/Bed#: S -01 Encounter: [id number]      Assessment & Plan     Left distal humerus fracture after fall  1  Pain control  2  NWB LUE in splint  3  Plan for surgery tomorrow consisting of ORIF left distal humerus with Dr Alberta Shetty  4  NPO after midnight      Chief Complaint     Left arm pain    History of the Present Illness     Evelin Vasquez is a 72 y o  female seen in orthopedic consultation at the request of Yanira Ramirez MD for evaluation patient's left upper extremity  Patient sustained a mechanical fall yesterday onto her left arm  After the fall she noted significant pain in the left arm with a deformity noted  She reported to the emergency department where x-rays were performed revealing a distal humerus fracture  She was placed in a splint  Currently her pain is well controlled with her arm in the splint  She denies numbness and tingling  No fevers or chills  Physical Exam     /56 (BP Location: Right arm)   Pulse 84   Temp 98 6 °F (37 °C) (Oral)   Resp 18   Ht 5' (1 524 m)   Wt 62 kg (136 lb 11 oz)   SpO2 94%   BMI 26 69 kg/m²     Left upper extremity:  Splint in place  Digital range of motion intact  Sensation intact median ulnar and radial nerves  Eyes:  Anicteric sclerae  Neck:  Supple  Lungs:  Unlabored breathing  Cardiovascular:  Capillary refill is less than 2 seconds  Skin:  Intact without erythema  Neurologic:  Sensation intact to light touch  Psychiatric:  Mood and affect are appropriate  Data Review     I have personally reviewed pertinent films in PACS, and my interpretation follows:    CT scan left humerus 8/9/20:  Comminuted distal humerus fracture with approximately 40° of angulation  I have personally reviewed pertinent lab results    Lab Results   Component Value Date    K 3 7 08/10/2020     08/10/2020    CO2 27 08/10/2020    BUN 21 08/10/2020 CREATININE 0 39 (L) 08/10/2020     Lab Results   Component Value Date    WBC 13 58 (H) 08/09/2020    HGB 13 4 08/09/2020     08/09/2020     Lab Results   Component Value Date    INR 0 99 08/16/2018    PTT 32 08/16/2018       Past Medical History:   Diagnosis Date    Anxiety     Scoliosis        Past Surgical History:   Procedure Laterality Date    BACK SURGERY  1974    velarde damon in back for scoliosis    COLONOSCOPY  01/01/2013    JOINT REPLACEMENT      left hip and knee    KIDNEY STONE SURGERY         Allergies   Allergen Reactions    Macrobid [Nitrofurantoin]        No current facility-administered medications on file prior to encounter        Current Outpatient Medications on File Prior to Encounter   Medication Sig Dispense Refill    acetaminophen (TYLENOL) 325 mg tablet Take 3 tablets (975 mg total) by mouth every 8 (eight) hours 30 tablet 0    ALPRAZolam (XANAX) 0 25 mg tablet Take 1 tablet (0 25 mg total) by mouth 3 (three) times a day as needed for anxiety 60 tablet 0    cholecalciferol (VITAMIN D3) 1,000 units tablet Take 1,000 Units by mouth daily      PARoxetine (PAXIL) 20 mg tablet Take 1 tablet (20 mg total) by mouth daily 30 tablet 3    senna (SENOKOT) 8 6 mg Take 1 tablet (8 6 mg total) by mouth daily at bedtime 120 each 0    docusate sodium (COLACE) 100 mg capsule Take 1 capsule (100 mg total) by mouth 2 (two) times a day (Patient not taking: Reported on 5/12/2020) 10 capsule 0    lidocaine (LIDODERM) 5 % Place 1 patch on the skin daily Remove & Discard patch within 12 hours or as directed by MD (Patient not taking: Reported on 5/12/2020) 3 patch 0    lidocaine (LIDODERM) 5 % Place 1 patch on the skin daily Remove & Discard patch within 12 hours or as directed by MD (Patient not taking: Reported on 5/12/2020) 3 patch 0    methocarbamol (ROBAXIN) 500 mg tablet Take 1 tablet (500 mg total) by mouth every 6 (six) hours as needed for muscle spasms (Patient not taking: Reported on 5/12/2020)  0    polyethylene glycol (MIRALAX) 17 g packet Take 17 g by mouth daily (Patient not taking: Reported on 5/12/2020) 14 each 0    tolterodine (DETROL) 1 mg tablet Take 1 tablet (1 mg total) by mouth 2 (two) times a day (Patient not taking: Reported on 8/9/2020) 60 tablet 3       Social History     Tobacco Use    Smoking status: Never Smoker    Smokeless tobacco: Never Used   Substance Use Topics    Alcohol use: Yes     Frequency: Monthly or less     Drinks per session: 1 or 2     Comment: very rarely    Drug use: No       Family History   Problem Relation Age of Onset    Skin cancer Mother     Hypertension Father     Colon cancer Maternal Uncle        Review of Systems     As stated in the HPI  All other systems were reviewed and are negative

## 2020-08-10 NOTE — ASSESSMENT & PLAN NOTE
· X ray finding of left comminuted distal humeral fracture, s/p fall; no need for aggressive tika-operative IVF which patient reports led to iatrogenic volume overload in past   · Pain control regimen  · DVT prophylaxis  · Orthopedic evaluation appreciated, ORIF tomorrow  · PT/OT evaluation post op

## 2020-08-10 NOTE — INCIDENTAL FINDINGS
The following findings require follow up:  Radiographic finding   Finding: enlarged pulmonary arteries   Follow up required: non urgent echocardiogram   Follow up should be done within 2-4 weeks    Please notify the following clinician to assist with the follow up:   Family doctor

## 2020-08-10 NOTE — H&P (VIEW-ONLY)
Consult Note - Orthopedics   Christal Fuller 72 y o  female MRN: [id number]  Unit/Bed#: S -01 Encounter: [id number]      Assessment & Plan     Left distal humerus fracture after fall  1  Pain control  2  NWB LUE in splint  3  Plan for surgery tomorrow consisting of ORIF left distal humerus with Dr Dayna Fofana  4  NPO after midnight      Chief Complaint     Left arm pain    History of the Present Illness     Christal Fuller is a 72 y o  female seen in orthopedic consultation at the request of Evelyne Meyer MD for evaluation patient's left upper extremity  Patient sustained a mechanical fall yesterday onto her left arm  After the fall she noted significant pain in the left arm with a deformity noted  She reported to the emergency department where x-rays were performed revealing a distal humerus fracture  She was placed in a splint  Currently her pain is well controlled with her arm in the splint  She denies numbness and tingling  No fevers or chills  Physical Exam     /56 (BP Location: Right arm)   Pulse 84   Temp 98 6 °F (37 °C) (Oral)   Resp 18   Ht 5' (1 524 m)   Wt 62 kg (136 lb 11 oz)   SpO2 94%   BMI 26 69 kg/m²     Left upper extremity:  Splint in place  Digital range of motion intact  Sensation intact median ulnar and radial nerves  Eyes:  Anicteric sclerae  Neck:  Supple  Lungs:  Unlabored breathing  Cardiovascular:  Capillary refill is less than 2 seconds  Skin:  Intact without erythema  Neurologic:  Sensation intact to light touch  Psychiatric:  Mood and affect are appropriate  Data Review     I have personally reviewed pertinent films in PACS, and my interpretation follows:    CT scan left humerus 8/9/20:  Comminuted distal humerus fracture with approximately 40° of angulation  I have personally reviewed pertinent lab results    Lab Results   Component Value Date    K 3 7 08/10/2020     08/10/2020    CO2 27 08/10/2020    BUN 21 08/10/2020 CREATININE 0 39 (L) 08/10/2020     Lab Results   Component Value Date    WBC 13 58 (H) 08/09/2020    HGB 13 4 08/09/2020     08/09/2020     Lab Results   Component Value Date    INR 0 99 08/16/2018    PTT 32 08/16/2018       Past Medical History:   Diagnosis Date    Anxiety     Scoliosis        Past Surgical History:   Procedure Laterality Date    BACK SURGERY  1974    velarde damon in back for scoliosis    COLONOSCOPY  01/01/2013    JOINT REPLACEMENT      left hip and knee    KIDNEY STONE SURGERY         Allergies   Allergen Reactions    Macrobid [Nitrofurantoin]        No current facility-administered medications on file prior to encounter        Current Outpatient Medications on File Prior to Encounter   Medication Sig Dispense Refill    acetaminophen (TYLENOL) 325 mg tablet Take 3 tablets (975 mg total) by mouth every 8 (eight) hours 30 tablet 0    ALPRAZolam (XANAX) 0 25 mg tablet Take 1 tablet (0 25 mg total) by mouth 3 (three) times a day as needed for anxiety 60 tablet 0    cholecalciferol (VITAMIN D3) 1,000 units tablet Take 1,000 Units by mouth daily      PARoxetine (PAXIL) 20 mg tablet Take 1 tablet (20 mg total) by mouth daily 30 tablet 3    senna (SENOKOT) 8 6 mg Take 1 tablet (8 6 mg total) by mouth daily at bedtime 120 each 0    docusate sodium (COLACE) 100 mg capsule Take 1 capsule (100 mg total) by mouth 2 (two) times a day (Patient not taking: Reported on 5/12/2020) 10 capsule 0    lidocaine (LIDODERM) 5 % Place 1 patch on the skin daily Remove & Discard patch within 12 hours or as directed by MD (Patient not taking: Reported on 5/12/2020) 3 patch 0    lidocaine (LIDODERM) 5 % Place 1 patch on the skin daily Remove & Discard patch within 12 hours or as directed by MD (Patient not taking: Reported on 5/12/2020) 3 patch 0    methocarbamol (ROBAXIN) 500 mg tablet Take 1 tablet (500 mg total) by mouth every 6 (six) hours as needed for muscle spasms (Patient not taking: Reported on 5/12/2020)  0    polyethylene glycol (MIRALAX) 17 g packet Take 17 g by mouth daily (Patient not taking: Reported on 5/12/2020) 14 each 0    tolterodine (DETROL) 1 mg tablet Take 1 tablet (1 mg total) by mouth 2 (two) times a day (Patient not taking: Reported on 8/9/2020) 60 tablet 3       Social History     Tobacco Use    Smoking status: Never Smoker    Smokeless tobacco: Never Used   Substance Use Topics    Alcohol use: Yes     Frequency: Monthly or less     Drinks per session: 1 or 2     Comment: very rarely    Drug use: No       Family History   Problem Relation Age of Onset    Skin cancer Mother     Hypertension Father     Colon cancer Maternal Uncle        Review of Systems     As stated in the HPI  All other systems were reviewed and are negative

## 2020-08-10 NOTE — UTILIZATION REVIEW
Notification of Inpatient Admission/Inpatient Authorization Request   This is a Notification of Inpatient Admission for [address]  Be advised that this patient was admitted to our facility under Inpatient Status  Contact Shawnee Lou at [phone] for additional admission information  [facility]  DEDICATED [phone]  Patient Name:   Ariana Cuenca   YOB: 1955       [address]   [address]:   [address]  Tax ID: [id number]  NPI: [id number] Attending Provider/NPI:  Address:  Phone: Abbie Lazar Md [[id number]]  Same as the facility  [phone]   Place of Service Code: 24 Place of Service Name:  [address] [place] [address]   Start Date: 8/10/20 1125     Discharge Date & Time: No discharge date for patient encounter  Type of Admission: Inpatient Status Discharge Disposition   (if discharged): Non [facility] SNF/TCU/SNU   Patient Diagnoses: Arm injury [S49 90XA]  Humerus fracture [S42 309A]     Orders: Admission Orders (From admission, onward)     Ordered        08/10/20 1125  Inpatient Admission  Once         08/09/20 0852  Place in Observation  Once                    Assigned Utilization Review Contact: Shawnee Lou  Utilization   Network Utilization Review Department  Phone: [phone]; Fax [phone]  Email: [email or web address]   ATTENTION PAYERS: Please call the assigned Utilization  directly with any questions or concerns ALL voicemails in the department are confidential  Send all requests for admission clinical reviews, approved or denied determinations and any other requests to dedicated fax number belonging to the campus where the patient is receiving treatment

## 2020-08-10 NOTE — PHYSICAL THERAPY NOTE
PHYSICAL THERAPY NOTE          Patient Name: Sarah Green  CLMIF'E Date: 8/10/2020  Consult received, chart reviewed  Pt is pending ORIF tomorrow for L humerus fx  Will complete PT orders at this time  Please re-consult when pt is medically appropriate for skilled PT evaluation       Sanam Becerril, PT

## 2020-08-10 NOTE — PROGRESS NOTES
Progress Note - Enio Cota 1955, 72 y o  female MRN: [id number]    Unit/Bed#: S -01 Encounter: [id number]    Primary Care Provider: Jovani Reyes MD   Date and time admitted to hospital: 2020  3:36 AM    * Closed fracture of distal end of humerus  Assessment & Plan  · X ray finding of left comminuted distal humeral fracture, s/p fall; no need for aggressive tika-operative IVF which patient reports led to iatrogenic volume overload in past   · Pain control regimen  · DVT prophylaxis  · Orthopedic evaluation appreciated, ORIF tomorrow  · PT/OT evaluation post op    Constipation  Assessment & Plan  · Continue bowel regimen    Mixed anxiety and depressive disorder  Assessment & Plan  Continue paxil    VTE Pharmacologic Prophylaxis:   Pharmacologic: Enoxaparin (Lovenox)  Mechanical VTE Prophylaxis in Place: No    Patient Centered Rounds: I have performed bedside rounds with nursing staff today  Discussions with Specialists or Other Care Team Provider: case mgmt, UR, ortho    Education and Discussions with Family / Patient:     Time Spent for Care: 25 min  More than 50% of total time spent on counseling and coordination of care as described above  Current Length of Stay: 0 day(s)    Current Patient Status: Inpatient   Certification Statement: The patient, admitted on an observation basis, will now require > 2 midnight hospital stay due to OR tomorrow    Discharge Plan: assess post op needs    Code Status: Level 1 - Full Code    Subjective:   Nursing called me earlier, as patient was having pain that was not being well treated with morphine; is improved significantly with 5 mg of oxy  She has also been having issues with constipation, and reports no bowel movement for a couple days  She also reports me that in  she had a postoperative respiratory issues due to receiving too many IV fluids but does not have any history of heart failure      Objective:     Vitals:   Temp (24hrs), Av 7 °F (37 1 °C), Min:98 3 °F (36 8 °C), Max:99 4 °F (37 4 °C)    Temp:  [98 3 °F (36 8 °C)-99 4 °F (37 4 °C)] 98 6 °F (37 °C)  HR:  [75-84] 84  Resp:  [16-18] 18  BP: (108-116)/(55-62) 116/56  SpO2:  [92 %-94 %] 94 %  Body mass index is 26 69 kg/m²  Input and Output Summary (last 24 hours): Intake/Output Summary (Last 24 hours) at 8/10/2020 1141  Last data filed at 8/10/2020 0700  Gross per 24 hour   Intake 240 ml   Output 225 ml   Net 15 ml       Physical Exam:     Physical Exam  Vitals signs reviewed  Constitutional:       General: She is not in acute distress  Appearance: She is not ill-appearing, toxic-appearing or diaphoretic  HENT:      Head: Normocephalic and atraumatic  Cardiovascular:      Rate and Rhythm: Normal rate and regular rhythm  Heart sounds: No murmur  Pulmonary:      Effort: Pulmonary effort is normal  No respiratory distress  Breath sounds: Normal breath sounds  No wheezing or rales  Abdominal:      General: Abdomen is flat  Bowel sounds are normal  There is no distension  Palpations: Abdomen is soft  Tenderness: There is no abdominal tenderness  Musculoskeletal:         General: Deformity present  Comments: Left upper extremity in splint   Skin:     General: Skin is warm and dry  Coloration: Skin is not jaundiced or pale  Findings: No erythema, lesion or rash  Neurological:      General: No focal deficit present  Mental Status: She is alert     Psychiatric:         Mood and Affect: Mood normal          Additional Data:     Labs:    Results from last 7 days   Lab Units 08/09/20  0847   WBC Thousand/uL 13 58*   HEMOGLOBIN g/dL 13 4   HEMATOCRIT % 41 6   PLATELETS Thousands/uL 215   LYMPHO PCT % 10*   MONO PCT % 2*   EOS PCT % 0     Results from last 7 days   Lab Units 08/10/20  0647   SODIUM mmol/L 138   POTASSIUM mmol/L 3 7   CHLORIDE mmol/L 105   CO2 mmol/L 27   BUN mg/dL 21   CREATININE mg/dL 0 39*   ANION GAP mmol/L 6   CALCIUM mg/dL 8 4   ALBUMIN g/dL 3 2*   TOTAL BILIRUBIN mg/dL 0 60   ALK PHOS U/L 50   ALT U/L 22   AST U/L 19   GLUCOSE RANDOM mg/dL 119                       * I Have Reviewed All Lab Data Listed Above  * Additional Pertinent Lab Tests Reviewed: Dave 66 Admission Reviewed    Imaging:    Imaging Reports Reviewed Today Include:   Imaging Personally Reviewed by Myself Includes:      Recent Cultures (last 7 days):           Last 24 Hours Medication List:   Current Facility-Administered Medications   Medication Dose Route Frequency Provider Last Rate    acetaminophen  975 mg Oral Q8H Isiah Anna MD      ALPRAZolam  0 25 mg Oral TID PRN Pati Vázquez MD      cholecalciferol  1,000 Units Oral Daily Pati Vázquez MD      docusate sodium  100 mg Oral BID Pati Vázquez MD      enoxaparin  40 mg Subcutaneous Daily Isiah Anna MD      HYDROmorphone  0 5 mg Intravenous Q4H PRN Brittany Singleton PA-C      methocarbamol  500 mg Oral Q6H PRN Pati Vázquez MD      ondansetron  4 mg Intravenous Q6H PRN Pati Vázquez MD      oxybutynin  5 mg Oral Daily Isiah Anna MD      oxyCODONE  2 5 mg Oral Q4H PRN Brittany Singleton PA-C      oxyCODONE  5 mg Oral Q4H PRN Brittany Singleton PA-C      PARoxetine  20 mg Oral Daily Pati Vázquez MD      polyethylene glycol  17 g Oral Daily Pati Vázquez MD      senna  1 tablet Oral BID Merlyn Travis PA-C          Today, Patient Was Seen By: Merlyn Travis PA-C    ** Please Note: Dictation voice to text software may have been used in the creation of this document   **

## 2020-08-10 NOTE — OCCUPATIONAL THERAPY NOTE
Occupational Therapy Cancel Note        Patient Name: Oralia FAULKNERZQ'SELMA Date: 8/10/2020    OT orders received and chart review completed  Spoke w/ CM, [facility] and PT, Brook Artis  Per orthopedics consult, plan for ORIF of L distal humerus  Please re consult following OR w/ activity and weight bearing restrictions   D/C OT    June , OTR/L

## 2020-08-11 ENCOUNTER — APPOINTMENT (INPATIENT)
Dept: RADIOLOGY | Facility: HOSPITAL | Age: 65
DRG: 494 | End: 2020-08-11
Payer: COMMERCIAL

## 2020-08-11 ENCOUNTER — ANESTHESIA (INPATIENT)
Dept: PERIOP | Facility: HOSPITAL | Age: 65
DRG: 494 | End: 2020-08-11
Payer: COMMERCIAL

## 2020-08-11 ENCOUNTER — ANESTHESIA EVENT (INPATIENT)
Dept: PERIOP | Facility: HOSPITAL | Age: 65
DRG: 494 | End: 2020-08-11
Payer: COMMERCIAL

## 2020-08-11 PROBLEM — R93.89 ABNORMAL CT SCAN: Status: ACTIVE | Noted: 2020-08-11

## 2020-08-11 PROCEDURE — 99232 SBSQ HOSP IP/OBS MODERATE 35: CPT | Performed by: PHYSICIAN ASSISTANT

## 2020-08-11 PROCEDURE — C1713 ANCHOR/SCREW BN/BN,TIS/BN: HCPCS | Performed by: SURGERY

## 2020-08-11 PROCEDURE — 0PSG04Z REPOSITION LEFT HUMERAL SHAFT WITH INTERNAL FIXATION DEVICE, OPEN APPROACH: ICD-10-PCS | Performed by: SURGERY

## 2020-08-11 PROCEDURE — 73070 X-RAY EXAM OF ELBOW: CPT

## 2020-08-11 PROCEDURE — 0LN40ZZ RELEASE LEFT UPPER ARM TENDON, OPEN APPROACH: ICD-10-PCS | Performed by: SURGERY

## 2020-08-11 PROCEDURE — 24515 OPTX HUMRL SHFT FX PLATE/SCR: CPT | Performed by: SURGERY

## 2020-08-11 PROCEDURE — 99024 POSTOP FOLLOW-UP VISIT: CPT | Performed by: PHYSICIAN ASSISTANT

## 2020-08-11 PROCEDURE — 24515 OPTX HUMRL SHFT FX PLATE/SCR: CPT | Performed by: ORTHOPAEDIC SURGERY

## 2020-08-11 PROCEDURE — 64718 REVISE ULNAR NERVE AT ELBOW: CPT | Performed by: SURGERY

## 2020-08-11 PROCEDURE — 64708 REVISE ARM/LEG NERVE: CPT | Performed by: SURGERY

## 2020-08-11 PROCEDURE — 01N40ZZ RELEASE ULNAR NERVE, OPEN APPROACH: ICD-10-PCS | Performed by: SURGERY

## 2020-08-11 DEVICE — 3.5MM CORTEX SCREW SELF-TAPPING 20MM: Type: IMPLANTABLE DEVICE | Site: ELBOW | Status: FUNCTIONAL

## 2020-08-11 DEVICE — 2.7MM/3.5MM VA-LCP POSTLAT DSTL HUM PL 9H/RT/153MM-XLNG
Type: IMPLANTABLE DEVICE | Site: ELBOW | Status: FUNCTIONAL
Brand: VA-LCP

## 2020-08-11 DEVICE — 2.7MM VA LCKNG SCREW SLF-TPNG WITH T8 STARDRIVE RECESS 14MM: Type: IMPLANTABLE DEVICE | Site: ELBOW | Status: FUNCTIONAL

## 2020-08-11 DEVICE — 3.5MM CORTEX SCREW SELF-TAPPING 22MM: Type: IMPLANTABLE DEVICE | Site: ELBOW | Status: FUNCTIONAL

## 2020-08-11 DEVICE — 3.5MM CORTEX SCREW SELF-TAPPING 24MM: Type: IMPLANTABLE DEVICE | Site: ELBOW | Status: FUNCTIONAL

## 2020-08-11 DEVICE — 2.7MM CORTEX SCREW SELF-TAPPING 18MM: Type: IMPLANTABLE DEVICE | Site: ELBOW | Status: FUNCTIONAL

## 2020-08-11 RX ORDER — CEFAZOLIN SODIUM 2 G/50ML
SOLUTION INTRAVENOUS AS NEEDED
Status: DISCONTINUED | OUTPATIENT
Start: 2020-08-11 | End: 2020-08-11

## 2020-08-11 RX ORDER — ONDANSETRON 2 MG/ML
4 INJECTION INTRAMUSCULAR; INTRAVENOUS ONCE AS NEEDED
Status: DISCONTINUED | OUTPATIENT
Start: 2020-08-11 | End: 2020-08-11 | Stop reason: HOSPADM

## 2020-08-11 RX ORDER — CEFAZOLIN SODIUM 2 G/50ML
2000 SOLUTION INTRAVENOUS EVERY 8 HOURS
Status: COMPLETED | OUTPATIENT
Start: 2020-08-12 | End: 2020-08-12

## 2020-08-11 RX ORDER — LIDOCAINE HYDROCHLORIDE 10 MG/ML
INJECTION, SOLUTION EPIDURAL; INFILTRATION; INTRACAUDAL; PERINEURAL
Status: DISCONTINUED | OUTPATIENT
Start: 2020-08-11 | End: 2020-08-11

## 2020-08-11 RX ORDER — FENTANYL CITRATE/PF 50 MCG/ML
25 SYRINGE (ML) INJECTION
Status: DISCONTINUED | OUTPATIENT
Start: 2020-08-11 | End: 2020-08-11 | Stop reason: HOSPADM

## 2020-08-11 RX ORDER — MAGNESIUM HYDROXIDE 1200 MG/15ML
LIQUID ORAL AS NEEDED
Status: DISCONTINUED | OUTPATIENT
Start: 2020-08-11 | End: 2020-08-11 | Stop reason: HOSPADM

## 2020-08-11 RX ORDER — LIDOCAINE HYDROCHLORIDE 10 MG/ML
INJECTION, SOLUTION EPIDURAL; INFILTRATION; INTRACAUDAL; PERINEURAL AS NEEDED
Status: DISCONTINUED | OUTPATIENT
Start: 2020-08-11 | End: 2020-08-11

## 2020-08-11 RX ORDER — ROPIVACAINE HYDROCHLORIDE 5 MG/ML
INJECTION, SOLUTION EPIDURAL; INFILTRATION; PERINEURAL
Status: DISCONTINUED | OUTPATIENT
Start: 2020-08-11 | End: 2020-08-11

## 2020-08-11 RX ORDER — SUCCINYLCHOLINE/SOD CL,ISO/PF 100 MG/5ML
SYRINGE (ML) INTRAVENOUS AS NEEDED
Status: DISCONTINUED | OUTPATIENT
Start: 2020-08-11 | End: 2020-08-11

## 2020-08-11 RX ORDER — ROCURONIUM BROMIDE 10 MG/ML
INJECTION, SOLUTION INTRAVENOUS AS NEEDED
Status: DISCONTINUED | OUTPATIENT
Start: 2020-08-11 | End: 2020-08-11

## 2020-08-11 RX ORDER — FENTANYL CITRATE 50 UG/ML
INJECTION, SOLUTION INTRAMUSCULAR; INTRAVENOUS AS NEEDED
Status: DISCONTINUED | OUTPATIENT
Start: 2020-08-11 | End: 2020-08-11

## 2020-08-11 RX ORDER — EPHEDRINE SULFATE 50 MG/ML
INJECTION INTRAVENOUS AS NEEDED
Status: DISCONTINUED | OUTPATIENT
Start: 2020-08-11 | End: 2020-08-11

## 2020-08-11 RX ORDER — MIDAZOLAM HYDROCHLORIDE 2 MG/2ML
INJECTION, SOLUTION INTRAMUSCULAR; INTRAVENOUS AS NEEDED
Status: DISCONTINUED | OUTPATIENT
Start: 2020-08-11 | End: 2020-08-11

## 2020-08-11 RX ORDER — PROPOFOL 10 MG/ML
INJECTION, EMULSION INTRAVENOUS AS NEEDED
Status: DISCONTINUED | OUTPATIENT
Start: 2020-08-11 | End: 2020-08-11

## 2020-08-11 RX ORDER — LANOLIN ALCOHOL/MO/W.PET/CERES
3 CREAM (GRAM) TOPICAL
Status: DISCONTINUED | OUTPATIENT
Start: 2020-08-11 | End: 2020-08-13 | Stop reason: HOSPADM

## 2020-08-11 RX ORDER — ONDANSETRON 2 MG/ML
INJECTION INTRAMUSCULAR; INTRAVENOUS AS NEEDED
Status: DISCONTINUED | OUTPATIENT
Start: 2020-08-11 | End: 2020-08-11

## 2020-08-11 RX ORDER — SODIUM CHLORIDE, SODIUM LACTATE, POTASSIUM CHLORIDE, CALCIUM CHLORIDE 600; 310; 30; 20 MG/100ML; MG/100ML; MG/100ML; MG/100ML
INJECTION, SOLUTION INTRAVENOUS CONTINUOUS PRN
Status: DISCONTINUED | OUTPATIENT
Start: 2020-08-11 | End: 2020-08-11

## 2020-08-11 RX ORDER — HYDROMORPHONE HCL/PF 1 MG/ML
0.5 SYRINGE (ML) INJECTION
Status: DISCONTINUED | OUTPATIENT
Start: 2020-08-11 | End: 2020-08-11 | Stop reason: HOSPADM

## 2020-08-11 RX ORDER — DEXAMETHASONE SODIUM PHOSPHATE 10 MG/ML
INJECTION, SOLUTION INTRAMUSCULAR; INTRAVENOUS AS NEEDED
Status: DISCONTINUED | OUTPATIENT
Start: 2020-08-11 | End: 2020-08-11

## 2020-08-11 RX ORDER — HYDROMORPHONE HCL 110MG/55ML
PATIENT CONTROLLED ANALGESIA SYRINGE INTRAVENOUS AS NEEDED
Status: DISCONTINUED | OUTPATIENT
Start: 2020-08-11 | End: 2020-08-11

## 2020-08-11 RX ADMIN — ALPRAZOLAM 0.25 MG: 0.25 TABLET ORAL at 11:42

## 2020-08-11 RX ADMIN — MELATONIN 3 MG: at 22:55

## 2020-08-11 RX ADMIN — SODIUM CHLORIDE, SODIUM LACTATE, POTASSIUM CHLORIDE, AND CALCIUM CHLORIDE: .6; .31; .03; .02 INJECTION, SOLUTION INTRAVENOUS at 14:55

## 2020-08-11 RX ADMIN — FENTANYL CITRATE 50 MCG: 50 INJECTION INTRAMUSCULAR; INTRAVENOUS at 15:02

## 2020-08-11 RX ADMIN — EPHEDRINE SULFATE 10 MG: 50 INJECTION, SOLUTION INTRAVENOUS at 18:21

## 2020-08-11 RX ADMIN — ROPIVACAINE HYDROCHLORIDE 30 ML: 5 INJECTION, SOLUTION EPIDURAL; INFILTRATION; PERINEURAL at 20:05

## 2020-08-11 RX ADMIN — STANDARDIZED SENNA CONCENTRATE 8.6 MG: 8.6 TABLET ORAL at 20:58

## 2020-08-11 RX ADMIN — DEXAMETHASONE SODIUM PHOSPHATE 4 MG: 10 INJECTION, SOLUTION INTRAMUSCULAR; INTRAVENOUS at 15:10

## 2020-08-11 RX ADMIN — FENTANYL CITRATE 50 MCG: 50 INJECTION INTRAMUSCULAR; INTRAVENOUS at 15:15

## 2020-08-11 RX ADMIN — ACETAMINOPHEN 975 MG: 325 TABLET, FILM COATED ORAL at 20:57

## 2020-08-11 RX ADMIN — MIDAZOLAM HYDROCHLORIDE 2 MG: 1 INJECTION, SOLUTION INTRAMUSCULAR; INTRAVENOUS at 14:57

## 2020-08-11 RX ADMIN — ALPRAZOLAM 0.25 MG: 0.25 TABLET ORAL at 02:13

## 2020-08-11 RX ADMIN — HYDROMORPHONE HYDROCHLORIDE 0.5 MG: 2 INJECTION INTRAMUSCULAR; INTRAVENOUS; SUBCUTANEOUS at 18:16

## 2020-08-11 RX ADMIN — SODIUM CHLORIDE, SODIUM LACTATE, POTASSIUM CHLORIDE, AND CALCIUM CHLORIDE: .6; .31; .03; .02 INJECTION, SOLUTION INTRAVENOUS at 18:39

## 2020-08-11 RX ADMIN — PROPOFOL 200 MG: 10 INJECTION, EMULSION INTRAVENOUS at 15:02

## 2020-08-11 RX ADMIN — OXYBUTYNIN CHLORIDE 5 MG: 5 TABLET ORAL at 08:49

## 2020-08-11 RX ADMIN — LIDOCAINE HYDROCHLORIDE 50 MG: 10 INJECTION, SOLUTION EPIDURAL; INFILTRATION; INTRACAUDAL; PERINEURAL at 15:02

## 2020-08-11 RX ADMIN — LIDOCAINE HYDROCHLORIDE 5 ML: 10 INJECTION, SOLUTION EPIDURAL; INFILTRATION; INTRACAUDAL; PERINEURAL at 20:05

## 2020-08-11 RX ADMIN — CEFAZOLIN SODIUM 2000 MG: 2 SOLUTION INTRAVENOUS at 15:31

## 2020-08-11 RX ADMIN — HYDROMORPHONE HYDROCHLORIDE 0.5 MG: 2 INJECTION INTRAMUSCULAR; INTRAVENOUS; SUBCUTANEOUS at 15:42

## 2020-08-11 RX ADMIN — DOCUSATE SODIUM 100 MG: 100 CAPSULE, LIQUID FILLED ORAL at 20:57

## 2020-08-11 RX ADMIN — ACETAMINOPHEN 975 MG: 325 TABLET, FILM COATED ORAL at 08:49

## 2020-08-11 RX ADMIN — OXYCODONE HYDROCHLORIDE 5 MG: 5 TABLET ORAL at 06:17

## 2020-08-11 RX ADMIN — CEFAZOLIN SODIUM 1000 MG: 2 SOLUTION INTRAVENOUS at 19:26

## 2020-08-11 RX ADMIN — ACETAMINOPHEN 975 MG: 325 TABLET, FILM COATED ORAL at 00:42

## 2020-08-11 RX ADMIN — PAROXETINE 20 MG: 20 TABLET, FILM COATED ORAL at 08:49

## 2020-08-11 RX ADMIN — Medication 100 MG: at 15:03

## 2020-08-11 RX ADMIN — ONDANSETRON 4 MG: 2 INJECTION INTRAMUSCULAR; INTRAVENOUS at 18:40

## 2020-08-11 RX ADMIN — ROCURONIUM BROMIDE 50 MG: 10 SOLUTION INTRAVENOUS at 15:10

## 2020-08-11 NOTE — PROGRESS NOTES
Progress Note - Betty Evans 1955, 72 y o  female MRN: [id number]    Unit/Bed#: S -01 Encounter: [id number]    Primary Care Provider: Rachel Ellis MD   Date and time admitted to hospital: 8/9/2020  3:36 AM    * Closed fracture of distal end of humerus  Assessment & Plan  · X ray finding of left comminuted distal humeral fracture, s/p fall; no need for aggressive tika-operative IVF which patient reports led to iatrogenic volume overload in past   · Pain control regimen  · DVT prophylaxis post op  · Orthopedic evaluation appreciated, ORIF today  · PT/OT evaluation post op    Constipation  Assessment & Plan  · Continue bowel regimen    Mixed anxiety and depressive disorder  Assessment & Plan  Continue paxil    Abnormal CT scan  Assessment & Plan  · As seen on CT EXTREMITY "Enlarged pulmonary arteries, can be seen with pulmonary artery hypertension"  · Reviewed this finding with patient  She has absolutely no pulmonary symptoms or history noted  Encouraged her to follow this up as an outpatient    VTE Pharmacologic Prophylaxis:   Pharmacologic: Enoxaparin (Lovenox)  Mechanical VTE Prophylaxis in Place: No    Patient Centered Rounds: I have performed bedside rounds with nursing staff today  Discussions with Specialists or Other Care Team Provider: case mgmt    Education and Discussions with Family / Patient:     Time Spent for Care: 20 minutes  More than 50% of total time spent on counseling and coordination of care as described above  Current Length of Stay: 1 day(s)    Current Patient Status: Inpatient   Certification Statement: The patient will continue to require additional inpatient hospital stay due to surgery today    Discharge Plan:  Assess discharge needs tomorrow    Code Status: Level 1 - Full Code    Subjective:   Patient reports she had a bad night sleep but reports her pain has been well controlled    She states that she kept waking up in the middle of the night disoriented to where she was  Still no BM  Objective:     Vitals:   Temp (24hrs), Av 3 °F (36 8 °C), Min:97 9 °F (36 6 °C), Max:98 8 °F (37 1 °C)    Temp:  [97 9 °F (36 6 °C)-98 8 °F (37 1 °C)] 97 9 °F (36 6 °C)  HR:  [74-96] 74  Resp:  [16-18] 16  BP: (118-135)/(66-76) 135/68  SpO2:  [93 %-94 %] 94 %  Body mass index is 26 69 kg/m²  Input and Output Summary (last 24 hours): Intake/Output Summary (Last 24 hours) at 2020 1205  Last data filed at 2020 0617  Gross per 24 hour   Intake    Output 1050 ml   Net -1050 ml       Physical Exam:     Physical Exam  Vitals signs reviewed  Constitutional:       General: She is not in acute distress  Appearance: She is not ill-appearing, toxic-appearing or diaphoretic  Cardiovascular:      Rate and Rhythm: Normal rate and regular rhythm  Heart sounds: No murmur  Pulmonary:      Effort: No respiratory distress  Breath sounds: No wheezing  Abdominal:      General: Bowel sounds are normal  There is no distension  Palpations: Abdomen is soft  Tenderness: There is no abdominal tenderness  Musculoskeletal:         General: Deformity (left arm in splint) present  Skin:     General: Skin is warm and dry  Coloration: Skin is not jaundiced or pale  Findings: No erythema or rash  Neurological:      General: No focal deficit present        Comments: Drowsy but interactive   Psychiatric:      Comments: cooperative       Additional Data:     Labs:    Results from last 7 days   Lab Units 20  0847   WBC Thousand/uL 13 58*   HEMOGLOBIN g/dL 13 4   HEMATOCRIT % 41 6   PLATELETS Thousands/uL 215   LYMPHO PCT % 10*   MONO PCT % 2*   EOS PCT % 0     Results from last 7 days   Lab Units 08/10/20  0647   SODIUM mmol/L 138   POTASSIUM mmol/L 3 7   CHLORIDE mmol/L 105   CO2 mmol/L 27   BUN mg/dL 21   CREATININE mg/dL 0 39*   ANION GAP mmol/L 6   CALCIUM mg/dL 8 4   ALBUMIN g/dL 3 2*   TOTAL BILIRUBIN mg/dL 0 60   ALK PHOS U/L 50   ALT U/L 22   AST U/L 19   GLUCOSE RANDOM mg/dL 119                       * I Have Reviewed All Lab Data Listed Above  * Additional Pertinent Lab Tests Reviewed: All Labs Within Last 24 Hours Reviewed    Imaging:    Imaging Reports Reviewed Today Include:   Imaging Personally Reviewed by Myself Includes:      Recent Cultures (last 7 days):           Last 24 Hours Medication List:   Current Facility-Administered Medications   Medication Dose Route Frequency Provider Last Rate    acetaminophen  975 mg Oral Q8H Isiah Anna MD      ALPRAZolam  0 25 mg Oral TID PRN Rubin Petersen MD      cholecalciferol  1,000 Units Oral Daily Isiah Anna MD      docusate sodium  100 mg Oral BID Rubin Petersen MD      enoxaparin  40 mg Subcutaneous Daily Rubin Petersen MD      HYDROmorphone  0 5 mg Intravenous Q4H PRN Brittany Singleton, EMANUEL      melatonin  3 mg Oral HS Brittany Singleton, EMANUEL      methocarbamol  500 mg Oral Q6H PRN Rubin Petersen MD      ondansetron  4 mg Intravenous Q6H PRN Rubin Petersen MD      oxybutynin  5 mg Oral Daily Isiah Anna MD      oxyCODONE  2 5 mg Oral Q4H PRN Brittany Singleton, EMANUEL      oxyCODONE  5 mg Oral Q4H PRN Brittany Singleton PA-C      PARoxetine  20 mg Oral Daily Rubin Petersen MD      polyethylene glycol  17 g Oral Daily Rubin Petersen MD      senna  1 tablet Oral BID Long Flores PA-C          Today, Patient Was Seen By: Long Flores PA-C    ** Please Note: Dictation voice to text software may have been used in the creation of this document   **

## 2020-08-11 NOTE — ASSESSMENT & PLAN NOTE
· As seen on CT EXTREMITY "Enlarged pulmonary arteries, can be seen with pulmonary artery hypertension"  · Reviewed this finding with patient  She has absolutely no pulmonary symptoms or history noted    Encouraged her to follow this up as an outpatient

## 2020-08-11 NOTE — PROGRESS NOTES
Progress Note - Orthopedics   [facility] 72 y o  female MRN: [id number]  Unit/Bed#: S -01      Subjective:    72 y  o female seen and evaluated  Patient reports some soreness in the left upper extremity but denies any numbness or tingling  She did have difficulty sleeping due to her injury    Denies numbness, tingling, CP, SOB    Labs:  0   Lab Value Date/Time    HCT 41 6 08/09/2020 0847    HCT 37 7 08/19/2018 0634    HCT 39 4 08/18/2018 0543    HGB 13 4 08/09/2020 0847    HGB 11 9 08/19/2018 0634    HGB 12 1 08/18/2018 0543    INR 0 99 08/16/2018 1434    WBC 13 58 (H) 08/09/2020 0847    WBC 6 94 08/19/2018 0634    WBC 8 79 08/18/2018 0543       Meds:    Current Facility-Administered Medications:     acetaminophen (TYLENOL) tablet 975 mg, 975 mg, Oral, Q8H, Isiah Anna MD, 975 mg at 08/11/20 0042    ALPRAZolam (XANAX) tablet 0 25 mg, 0 25 mg, Oral, TID PRN, Darrick Mathur MD, 0 25 mg at 08/11/20 0213    cholecalciferol (VITAMIN D3) tablet 1,000 Units, 1,000 Units, Oral, Daily, Darrick Mathur MD, 1,000 Units at 08/10/20 0857    docusate sodium (COLACE) capsule 100 mg, 100 mg, Oral, BID, Darrick Mathur MD, 100 mg at 08/10/20 1631    enoxaparin (LOVENOX) subcutaneous injection 40 mg, 40 mg, Subcutaneous, Daily, Darrick Mathur MD, Stopped at 08/11/20 0900    HYDROmorphone (DILAUDID) injection 0 5 mg, 0 5 mg, Intravenous, Q4H PRN, Brittany Singleton PA-C    methocarbamol (ROBAXIN) tablet 500 mg, 500 mg, Oral, Q6H PRN, Martínez Anna MD, 500 mg at 08/10/20 3500    ondansetron (ZOFRAN) injection 4 mg, 4 mg, Intravenous, Q6H PRN, Darrick Mathur MD    oxybutynin (DITROPAN) tablet 5 mg, 5 mg, Oral, Daily, Darrick Mathur MD, 5 mg at 08/10/20 0859    oxyCODONE (ROXICODONE) IR tablet 2 5 mg, 2 5 mg, Oral, Q4H PRN, Brittany Singleton PA-C    oxyCODONE (ROXICODONE) IR tablet 5 mg, 5 mg, Oral, Q4H PRN, Brittany Singleton PA-C, 5 mg at 08/11/20 0617    PARoxetine (PAXIL) tablet 20 mg, 20 mg, Oral, Daily, Chantal Wu MD, 20 mg at 08/10/20 0859    polyethylene glycol (MIRALAX) packet 17 g, 17 g, Oral, Daily, Isiah Anna MD    senna (SENOKOT) tablet 8 6 mg, 1 tablet, Oral, BID, Brittany Singleton PA-C, 8 6 mg at 08/10/20 1631    Blood Culture:   No results found for: BLOODCX    Wound Culture:   No results found for: WOUNDCULT    Ins and Outs:  I/O last 24 hours: In: 0   Out: 1275 [Urine:1275]          Physical:  Vitals:    08/10/20 2211   BP: 126/76   Pulse: 96   Resp: 18   Temp: 98 1 °F (36 7 °C)   SpO2: 94%     Musculoskeletal: left Upper Extremity  · Skin warm well perfused, moderate edema in the finger tips  · Splint is clean dry and intact  · SILT m/r/u  Motor grossly intact, 2+ radial pulse      Assessment:    65 y  o female with left humeral shaft fracture     Plan:  · NWB in splint  · Plan for OR today for ORIF of the left humerus, NPO until after surgery  · PT/OT following surgery  · Pain control  · DVT ppx - per primary  · Dispo: Ortho will follow    Karishma Terrell PA-C

## 2020-08-11 NOTE — ANESTHESIA PREPROCEDURE EVALUATION
Procedure:  ORIF left distal humerus (Left Elbow)    Relevant Problems   CARDIO   (+) Postoperative acute deep vein thrombosis (DVT) of lower extremity (HCC)      MUSCULOSKELETAL   (+) Osteoarthritis      NEURO/PSYCH   (+) History of kidney stones   (+) Mixed anxiety and depressive disorder             Anesthesia Plan  ASA Score- 2     Anesthesia Type- regional and general with ASA Monitors  Additional Monitors:   Airway Plan: LMA  Comment: No PNB per surgeon's request  Possible postoperative block for pain control after neurovascular exam        Plan Factors-Exercise tolerance (METS): >4 METS  Chart reviewed  Patient summary reviewed  Induction- intravenous  Postoperative Plan- Plan for postoperative opioid use  Informed Consent- Anesthetic plan and risks discussed with patient  I personally reviewed this patient with the CRNA  Discussed and agreed on the Anesthesia Plan with the CRNA  Benedicto Srivastava

## 2020-08-11 NOTE — ANESTHESIA POSTPROCEDURE EVALUATION
Post-Op Assessment Note    CV Status:  Stable    Pain management: adequate     Mental Status:  Alert and awake   Hydration Status:  Euvolemic   PONV Controlled:  Controlled   Airway Patency:  Patent      Post Op Vitals Reviewed: Yes      Staff: CRNA   Comments: vs as noted report rn        No complications documented      BP (P) 149/65 (08/11/20 1945)    Temp (!) (P) 97 2 °F (36 2 °C) (08/11/20 1945)    Pulse (!) (P) 120 (08/11/20 1945)   Resp (P) 16 (08/11/20 1945)    SpO2 (P) 92 % (08/11/20 1945)

## 2020-08-11 NOTE — OP NOTE
OPERATIVE REPORT  PATIENT NAME: Leonila Hernandez  :  1955  MRN: [id number]  Pt Location: [facility]    SURGERY DATE: 20    Surgeon(s) and Role:     * Eve Bright MD - Primary     * AMARILIS Bae-MERARI - Assisting     * Gonzalo Staton MD - 1st assist     Pre-Op Diagnosis:  Other closed displaced fracture of distal end of left humerus, initial encounter [S42 492A]    Post-Op Diagnosis Codes:     * Other closed displaced fracture of distal end of left humerus, initial encounter [S42 492A]    Procedure(s):  ORIF left humeral shaft, ulnar nerve release, tenotomy anconeus epitrochlearis, radial nerve dissection (Left)    Specimen(s):  * No orders in the log *    Estimated Blood Loss:   Minimal    Anesthesia Type:   Choice    IMPLANTS:  Implant Name Type Inv  Item Serial No   Lot No  LRB No  Used Action   PLATE VA LCP 9 HL RT 153MM XTRA LNG - [id number]  PLATE VA LCP 9 HL RT 153MM XTRA LNG  Synthes  Left 1 Implanted   SCREW LCK VA 2 7 X 14MM T8 STRDRV RECES SLF TAP - [id number]  SCREW LCK VA 2 7 X 14MM T8 STRDRV RECES SLF TAP  Synthes  Left 2 Implanted   SCREW CRTX 2 7 X 18MM SLF TAP SS - [id number]  SCREW CRTX 2 7 X 18MM SLF TAP SS  Synthes  Left 1 Implanted   SCREW CRTX 2 7 X 20MM SLF TAP - [id number]  SCREW CRTX 2 7 X 20MM SLF TAP  Synthes  Left 1 Implanted and Explanted   SCREW CRTX 3 5 X 20MM SLF TAP SS - [id number]  SCREW CRTX 3 5 X 20MM SLF TAP SS  Synthes  Left 6 Implanted   SCREW CRTX 3 5 X 22MM SLF TAP - [id number]  SCREW CRTX 3 5 X 22MM SLF TAP  Synthes  Left 1 Implanted   SCREW CRTX 3 5 X 24MM SLF TAP SS - [id number]  SCREW CRTX 3 5 X 24MM SLF TAP SS  Synthes  Left 1 Implanted       PERIOPERATIVE ANTIBIOTICS:    cefazolin, 2 grams            Operative Indications: The patient has a history of left distal humeral shaft fracture that was recalcitrant to conservative management    The decision was made to bring the patient to the operating room for  Left  Humeral distal shaft open reduction internal fixation, ulnar nerve decompression, radial nerve dissection  Risks of the procedure were explained which include, but are not limited to bleeding; infection; damage to nerves, arteries,veins, tendons; scar; pain; need for reoperation; failure to give desired result; and risks of anaesthesia  All questions were answered to satisfaction and they were willing to proceed  Operative Findings:  Comminution with small fragment loss at fracture site  Fully decompressed radial nerve, final position in the spiral grove just proximal to the plate   Incidental finding of anconeus epitrochlearis, released     Complications:   None    Procedure and Technique:  After the patient, site, and procedure were identified, the patient was brought into the operating room in a supine position  General anaesthesia was provided  A tourniquet was not used  Positioned in the lateral decubitus position with all bony prominences well padded with the aid of a beanbag, a axillary roll was used  The  left upper extremity was then prepped and drapped in a normal, sterile, orthopedic fashion  A 27 cm longitudinal posterior incision was made curving slightly laterally around the tip of the olecranon  Full-thickness skin flaps were elevated  Care was taken  to control all superficial bleeding small vessels  Triceps fascia was identified and incised longitudinally in the midline  The medial skin flap was developed 1st   Care was taken to not violate the triceps fascia  The ulnar nerve was identified just proximal to the cubital tunnel, and was released  With the nerve under direct visualization under loupe magnification  full decompression was undertaken extending proximally past the level of the intermuscular septum which was released  Once the full proximal decompression was established, the nerve was traced distally into the cubital tunnel where again it was fully unroofed    It was at this time there was noted to be an anconeus epitrochlearis, which was tenotomized in order to complete a full nerve release  The ulnar nerve was released to its 1st motor branch  And then gently transposed anteriorly out of the surgical field to allow for better visualization of the humerus  We then turned our attention to the lateral side again a full-thickness skin flap was elevated  Having preoperatively measured the epitrochlear distance at 4 cm, the radial nerve was anticipated to cross in the spiral groove approximately 7 cm proximal to the lateral most point of the lateral epicondyle which was marked out on the triceps  Care was taken when raising the full-thickness skin flap to identify the posterior cutaneous branch  Once this was accomplished was followed back to the radial nerve proper  Under direct visualization the radial nerve was dissected up as was the accompanying vasculature, into the spiral groove  Care was taken to minimally manipulate the nerve, and allowed to retain its vascular supply  Once the nerve was fully freed and protected, the triceps was bluntly elevated off of the distal humerus using a key elevator  This was done both on the radial aspect and on the ulnar aspect, being careful to protect the radial and ulnar nerves respectively  Once the triceps was sufficiently elevated the fracture fragments were identified, the pattern was noted to be somewhat comminuted with a medial butterfly fragment and a small missing internal piece  Dr Fausto Smith assisted in reduction of the fracture both by helping with reduction and placement of the screws  His assistance was needed given the complexity of the fracture, with comminution and missing fragment making holding the reduction and placement of screws by 1 surgeon difficult  The butterfly fragment was reduced to the distal fragment with a point-to-point clamps in an interfragmentary 3 5 mm screw was placed after being drilled measured appropriately    A 2nd interfragmentary 2 7 mm screw was placed more proximally , providing provisional fixation to the butterfly fragment and the proximal shaft  Once with this was accomplished a 9 hole posterior lateral plate was applied  Orthogonal fluoroscopic views at this point demonstrated a near-anatomic reduction with appropriate length plate  Care was taken when placing the plate to make sure that the radial and ulnar nerves were protected all times  The radial nerve was found to be just proximal to the proximal tip of the plate  Once or orthogonal fluoroscopic views demonstrated good fracture reduction and appropriate plate placement for proximal cortical screws were drilled measured and placed proximal to the fracture site  Once this was accomplished four  additional cortical screws were placed in the distal fragment allowing for 8 cortices on both sides of the fracture  An additional 2 short locking screws were placed in the distal portion of the plate for additional fixation  Orthogonal fluoroscopic films demonstrated good fracture reduction with appropriate screw length and position without any intra-articular screw penetration  At the completion of the procedure, hemostasis was obtained with cautery and direct pressure  The wounds were copiously irrigated with sterile solution  The triceps fascia was closed in interrupted fashion with an 0 Vicryl  A layered Vicryl closure was performed of the soft tissues posteriorly  The wounds were closed with Vicryl and Staples  Sterile dressings were applied, including Xeroform, gauze, tweeners, webril, ACE and Sling  Please note, all sponge, needle, and instrument counts were correct prior to closure  Loupe magnification was utilized  The patient tolerated the procedure well       I was present for the entire procedure, A qualified resident physician was not available and A physician assistant was required during the procedure for retraction tissue handling,dissection and suturing    Please note patient was noted to be firing her radial nerve postoperatively with demonstration of good wrist extension strength     Patient Disposition:  PACU     SIGNATURE: Whitney Lira MD  DATE: 08/11/20  TIME: 7:25 PM

## 2020-08-11 NOTE — DISCHARGE INSTRUCTIONS
Post Operative Instructions    You have had surgery on your arm today, please read and follow the information below:  · Elevate your hand above your elbow during the next 24-48 hours to help with swelling  · Place your hand and arm over your head with motion at your shoulder three times a day  · Do not apply any cream/ointment/oil to your incisions including antibiotics  · Do not soak your hands in standing water (dishwater, tubs, Jacuzzi's, pools, etc ) until given permission (typically 2-3 weeks after injury)    Call the office if you notice any:  · Increased numbness or tingling of your hand or fingers that is not relieved with elevation  · Increasing pain that is not controlled with medication  · Difficulty chewing, breathing, swallowing  · Chest pains or shortness of breath  · Fever over 101 4 degrees  Bandage: Remove bandage after 7-10 days  Please do not put any topical agents on the surgical wound including neosporin, peroxide, tea tree oil, vitamin E, etc  as these can delay wound healing  Motion: Move fingers into a fist 5 times a day, DO NOT move any splinted fingers  Weight bearing status: Avoid heavy lifting (>5 pounds) with the extremity that was operated on until follow up appointment  Normal activities of daily living are OK  Ice: Ice for 10 minutes every hour as needed for swelling x 24 hours  Sling: No sling necessary  Pain medication:   Robaxin 500 mg every 6 hours as needed for muscle spasm pain  Tylenol Extended Release 650 mg every 6 hours  oxycodone tab every 4 hours ONLY AS NEEDED for severe pain         Follow-up Appointment: 10-14 days with Dr Alonso Jang        Please call the office if you have any questions or concerns regarding your post-operative care

## 2020-08-11 NOTE — INTERVAL H&P NOTE
H&P reviewed  After examining the patient I find no changes in the patients condition since the H&P had been written    Has pain, but can extend fingers, difficult to test strength of ECRB/ECRL d/t splint       Vitals:    08/11/20 1234   BP: 116/55   Pulse: 80   Resp: 19   Temp: 98 3 °F (36 8 °C)   SpO2: 95%

## 2020-08-11 NOTE — ASSESSMENT & PLAN NOTE
· X ray finding of left comminuted distal humeral fracture, s/p fall; no need for aggressive tika-operative IVF which patient reports led to iatrogenic volume overload in past   · Pain control regimen  · DVT prophylaxis post op  · Orthopedic evaluation appreciated, ORIF today  · PT/OT evaluation post op

## 2020-08-12 PROCEDURE — 97163 PT EVAL HIGH COMPLEX 45 MIN: CPT

## 2020-08-12 PROCEDURE — 97167 OT EVAL HIGH COMPLEX 60 MIN: CPT

## 2020-08-12 PROCEDURE — 97530 THERAPEUTIC ACTIVITIES: CPT

## 2020-08-12 PROCEDURE — 99024 POSTOP FOLLOW-UP VISIT: CPT | Performed by: PHYSICIAN ASSISTANT

## 2020-08-12 PROCEDURE — 99232 SBSQ HOSP IP/OBS MODERATE 35: CPT | Performed by: PHYSICIAN ASSISTANT

## 2020-08-12 RX ADMIN — ENOXAPARIN SODIUM 40 MG: 40 INJECTION SUBCUTANEOUS at 08:11

## 2020-08-12 RX ADMIN — OXYCODONE HYDROCHLORIDE 2.5 MG: 5 TABLET ORAL at 20:38

## 2020-08-12 RX ADMIN — ACETAMINOPHEN 975 MG: 325 TABLET, FILM COATED ORAL at 05:29

## 2020-08-12 RX ADMIN — DOCUSATE SODIUM 100 MG: 100 CAPSULE, LIQUID FILLED ORAL at 17:31

## 2020-08-12 RX ADMIN — DOCUSATE SODIUM 100 MG: 100 CAPSULE, LIQUID FILLED ORAL at 08:10

## 2020-08-12 RX ADMIN — CEFAZOLIN SODIUM 2000 MG: 2 SOLUTION INTRAVENOUS at 02:26

## 2020-08-12 RX ADMIN — METHOCARBAMOL TABLETS 500 MG: 500 TABLET, COATED ORAL at 20:39

## 2020-08-12 RX ADMIN — ACETAMINOPHEN 975 MG: 325 TABLET, FILM COATED ORAL at 20:44

## 2020-08-12 RX ADMIN — HYDROMORPHONE HYDROCHLORIDE 0.5 MG: 1 INJECTION, SOLUTION INTRAMUSCULAR; INTRAVENOUS; SUBCUTANEOUS at 13:08

## 2020-08-12 RX ADMIN — Medication 1000 UNITS: at 08:11

## 2020-08-12 RX ADMIN — POLYETHYLENE GLYCOL 3350 17 G: 17 POWDER, FOR SOLUTION ORAL at 08:11

## 2020-08-12 RX ADMIN — HYDROMORPHONE HYDROCHLORIDE 0.5 MG: 1 INJECTION, SOLUTION INTRAMUSCULAR; INTRAVENOUS; SUBCUTANEOUS at 18:55

## 2020-08-12 RX ADMIN — OXYCODONE HYDROCHLORIDE 2.5 MG: 5 TABLET ORAL at 15:32

## 2020-08-12 RX ADMIN — PAROXETINE 20 MG: 20 TABLET, FILM COATED ORAL at 08:10

## 2020-08-12 RX ADMIN — STANDARDIZED SENNA CONCENTRATE 8.6 MG: 8.6 TABLET ORAL at 08:10

## 2020-08-12 RX ADMIN — ALPRAZOLAM 0.25 MG: 0.25 TABLET ORAL at 10:52

## 2020-08-12 RX ADMIN — STANDARDIZED SENNA CONCENTRATE 8.6 MG: 8.6 TABLET ORAL at 17:31

## 2020-08-12 RX ADMIN — OXYBUTYNIN CHLORIDE 5 MG: 5 TABLET ORAL at 08:10

## 2020-08-12 RX ADMIN — OXYCODONE HYDROCHLORIDE 5 MG: 5 TABLET ORAL at 07:03

## 2020-08-12 RX ADMIN — CEFAZOLIN SODIUM 2000 MG: 2 SOLUTION INTRAVENOUS at 11:13

## 2020-08-12 RX ADMIN — ACETAMINOPHEN 975 MG: 325 TABLET, FILM COATED ORAL at 13:08

## 2020-08-12 RX ADMIN — MELATONIN 3 MG: at 21:35

## 2020-08-12 RX ADMIN — HYDROMORPHONE HYDROCHLORIDE 0.5 MG: 1 INJECTION, SOLUTION INTRAMUSCULAR; INTRAVENOUS; SUBCUTANEOUS at 09:03

## 2020-08-12 RX ADMIN — OXYCODONE HYDROCHLORIDE 5 MG: 5 TABLET ORAL at 11:13

## 2020-08-12 NOTE — PLAN OF CARE
Problem: Potential for Falls  Goal: Patient will remain free of falls  Description: INTERVENTIONS:  - Assess patient frequently for physical needs  -  Identify cognitive and physical deficits and behaviors that affect risk of falls  -  [place] fall precautions as indicated by assessment   - Educate patient/family on patient safety including physical limitations  - Instruct patient to call for assistance with activity based on assessment  - Modify environment to reduce risk of injury  - Consider OT/PT consult to assist with strengthening/mobility  Outcome: Progressing     Problem: Prexisting or High Potential for Compromised Skin Integrity  Goal: Skin integrity is maintained or improved  Description: INTERVENTIONS:  - Identify patients at risk for skin breakdown  - Assess and monitor skin integrity  - Assess and monitor nutrition and hydration status  - Monitor labs   - Assess for incontinence   - Turn and reposition patient  - Assist with mobility/ambulation  - Relieve pressure over bony prominences  - Avoid friction and shearing  - Provide appropriate hygiene as needed including keeping skin clean and dry  - Evaluate need for skin moisturizer/barrier cream  - Collaborate with interdisciplinary team   - Patient/family teaching  - Consider wound care consult   Outcome: Progressing     Problem: Nutrition/Hydration-ADULT  Goal: Nutrient/Hydration intake appropriate for improving, restoring or maintaining nutritional needs  Description: Monitor and assess patient's nutrition/hydration status for malnutrition  Collaborate with interdisciplinary team and initiate plan and interventions as ordered  Monitor patient's weight and dietary intake as ordered or per policy  Utilize nutrition screening tool and intervene as necessary  Determine patient's food preferences and provide high-protein, high-caloric foods as appropriate       INTERVENTIONS:  - Monitor oral intake, urinary output, labs, and treatment plans  - Assess nutrition and hydration status and recommend course of action  - Evaluate amount of meals eaten  - Assist patient with eating if necessary   - Allow adequate time for meals  - Recommend/ encourage appropriate diets, oral nutritional supplements, and vitamin/mineral supplements  - Order, calculate, and assess calorie counts as needed  - Recommend, monitor, and adjust tube feedings and TPN/PPN based on assessed needs  - Assess need for intravenous fluids  - Provide specific nutrition/hydration education as appropriate  - Include patient/family/caregiver in decisions related to nutrition  Outcome: Progressing     Problem: PAIN - ADULT  Goal: Verbalizes/displays adequate comfort level or baseline comfort level  Description: Interventions:  - Encourage patient to monitor pain and request assistance  - Assess pain using appropriate pain scale  - Administer analgesics based on type and severity of pain and evaluate response  - Implement non-pharmacological measures as appropriate and evaluate response  - Consider cultural and social influences on pain and pain management  - Notify physician/advanced practitioner if interventions unsuccessful or patient reports new pain  Outcome: Progressing     Problem: INFECTION - ADULT  Goal: Absence or prevention of progression during hospitalization  Description: INTERVENTIONS:  - Assess and monitor for signs and symptoms of infection  - Monitor lab/diagnostic results  - Monitor all insertion sites, i e  indwelling lines, tubes, and drains  - Monitor endotracheal if appropriate and nasal secretions for changes in amount and color  - [place] appropriate cooling/warming therapies per order  - Administer medications as ordered  - Instruct and encourage patient and family to use good hand hygiene technique  - Identify and instruct in appropriate isolation precautions for identified infection/condition  Outcome: Progressing  Goal: Absence of fever/infection during neutropenic period  Description: INTERVENTIONS:  - Monitor WBC    Outcome: Progressing     Problem: SAFETY ADULT  Goal: Patient will remain free of falls  Description: INTERVENTIONS:  - Assess patient frequently for physical needs  -  Identify cognitive and physical deficits and behaviors that affect risk of falls    -  [place] fall precautions as indicated by assessment   - Educate patient/family on patient safety including physical limitations  - Instruct patient to call for assistance with activity based on assessment  - Modify environment to reduce risk of injury  - Consider OT/PT consult to assist with strengthening/mobility  Outcome: Progressing  Goal: Maintain or return to baseline ADL function  Description: INTERVENTIONS:  -  Assess patient's ability to carry out ADLs; assess patient's baseline for ADL function and identify physical deficits which impact ability to perform ADLs (bathing, care of mouth/teeth, toileting, grooming, dressing, etc )  - Assess/evaluate cause of self-care deficits   - Assess range of motion  - Assess patient's mobility; develop plan if impaired  - Assess patient's need for assistive devices and provide as appropriate  - Encourage maximum independence but intervene and supervise when necessary  - Involve family in performance of ADLs  - Assess for home care needs following discharge   - Consider OT consult to assist with ADL evaluation and planning for discharge  - Provide patient education as appropriate  Outcome: Progressing  Goal: Maintain or return mobility status to optimal level  Description: INTERVENTIONS:  - Assess patient's baseline mobility status (ambulation, transfers, stairs, etc )    - Identify cognitive and physical deficits and behaviors that affect mobility  - Identify mobility aids required to assist with transfers and/or ambulation (gait belt, sit-to-stand, lift, walker, cane, etc )  - [place] fall precautions as indicated by assessment  - Record patient progress and toleration of activity level on Mobility SBAR; progress patient to next Phase/Stage  - Instruct patient to call for assistance with activity based on assessment  - Consider rehabilitation consult to assist with strengthening/weightbearing, etc   Outcome: Progressing     Problem: DISCHARGE PLANNING  Goal: Discharge to home or other facility with appropriate resources  Description: INTERVENTIONS:  - Identify barriers to discharge w/patient and caregiver  - Arrange for needed discharge resources and transportation as appropriate  - Identify discharge learning needs (meds, wound care, etc )  - Arrange for interpretive services to assist at discharge as needed  - Refer to Case Management Department for coordinating discharge planning if the patient needs post-hospital services based on physician/advanced practitioner order or complex needs related to functional status, cognitive ability, or social support system  Outcome: Progressing     Problem: Knowledge Deficit  Goal: Patient/family/caregiver demonstrates understanding of disease process, treatment plan, medications, and discharge instructions  Description: Complete learning assessment and assess knowledge base    Interventions:  - Provide teaching at level of understanding  - Provide teaching via preferred learning methods  Outcome: Progressing     Problem: MUSCULOSKELETAL - ADULT  Goal: Maintain or return mobility to safest level of function  Description: INTERVENTIONS:  - Assess patient's ability to carry out ADLs; assess patient's baseline for ADL function and identify physical deficits which impact ability to perform ADLs (bathing, care of mouth/teeth, toileting, grooming, dressing, etc )  - Assess/evaluate cause of self-care deficits   - Assess range of motion  - Assess patient's mobility  - Assess patient's need for assistive devices and provide as appropriate  - Encourage maximum independence but intervene and supervise when necessary  - Involve family in performance of ADLs  - Assess for home care needs following discharge   - Consider OT consult to assist with ADL evaluation and planning for discharge  - Provide patient education as appropriate  Outcome: Progressing  Goal: Maintain proper alignment of affected body part  Description: INTERVENTIONS:  - Support, maintain and protect limb and body alignment  - Provide patient/ family with appropriate education  Outcome: Progressing

## 2020-08-12 NOTE — SOCIAL WORK
[facility] and [facility] rehab both denied for inpatient rehab  Patient has no other medical needs for acute rehab  Recommending home with family support vs  snf rehab

## 2020-08-12 NOTE — PROGRESS NOTES
Progress Note - Evelin Vasquez 1955, 72 y o  female MRN: [id number]    Unit/Bed#: S -01 Encounter: [id number]    Primary Care Provider: Iza Ramos MD   Date and time admitted to hospital: 8/9/2020  3:36 AM  * Closed fracture of distal end of humerus  Assessment & Plan  · X ray finding of left comminuted distal humeral fracture, s/p fall; no need for aggressive tika-operative IVF which patient reports led to iatrogenic volume overload in past   · Pain control regimen  · DVT prophylaxis   · Orthopedic evaluation appreciated  · POD #1 s/p ORIF  · OT evaluation pending  · Noted other minor injuries from her fall    Constipation  Assessment & Plan  · Continue bowel regimen    Mixed anxiety and depressive disorder  Assessment & Plan  Continue paxil    Abnormal CT scan  Assessment & Plan  · As seen on CT EXTREMITY "Enlarged pulmonary arteries, can be seen with pulmonary artery hypertension"  · Reviewed this finding with patient  She has absolutely no pulmonary symptoms or history noted  Encouraged her to follow this up as an outpatient    VTE Pharmacologic Prophylaxis:   Pharmacologic: Enoxaparin (Lovenox)  Mechanical VTE Prophylaxis in Place: Yes    Patient Centered Rounds: I have performed bedside rounds with nursing staff today  Discussions with Specialists or Other Care Team Provider: case mgmt    Education and Discussions with Family / Patient:     Time Spent for Care: 25 min  More than 50% of total time spent on counseling and coordination of care as described above      Current Length of Stay: 2 day(s)    Current Patient Status: Inpatient   Certification Statement: The patient will continue to require additional inpatient hospital stay due to Awaiting occupational therapy evaluation    Discharge Plan:  Anticipate hopeful discharge home later today with outpatient or home OT; however if patient is significantly debilitated, will need to entertain possibility of acute rehab referral if she would agree  Patient will not agree to any skilled nursing facility referrals    Code Status: Level 1 - Full Code      Subjective:   Patient reported that her arm feels a little heavy and achy but she is not having any severe pain and is grateful that everything went well yesterday with surgery  She still has not had a bowel movement but took some MiraLax this morning and believes it will work  She is not having any nausea or vomiting  No other new events or concerns reported  Patient reports she feels very anxious when discussing any possibility of going anywhere but home as she had a negative experience previously in a nursing home  Objective:     Vitals:   Temp (24hrs), Av 4 °F (36 9 °C), Min:97 2 °F (36 2 °C), Max:99 4 °F (37 4 °C)    Temp:  [97 2 °F (36 2 °C)-99 4 °F (37 4 °C)] 99 4 °F (37 4 °C)  HR:  [] 88  Resp:  [16-21] 18  BP: ()/(50-91) 101/50  SpO2:  [91 %-98 %] 93 %  Body mass index is 26 56 kg/m²  Input and Output Summary (last 24 hours): Intake/Output Summary (Last 24 hours) at 2020 1025  Last data filed at 2020 0109  Gross per 24 hour   Intake 1300 ml   Output 500 ml   Net 800 ml       Physical Exam:     Physical Exam  Vitals signs reviewed  Constitutional:       General: She is not in acute distress  Appearance: Normal appearance  She is not ill-appearing, toxic-appearing or diaphoretic  HENT:      Head:      Comments: Noted some faint healing ecchymosis on her left forehead     Nose: No congestion  Eyes:      Conjunctiva/sclera: Conjunctivae normal    Cardiovascular:      Rate and Rhythm: Normal rate and regular rhythm  Heart sounds: No murmur  Pulmonary:      Effort: No respiratory distress  Breath sounds: No wheezing or rhonchi  Abdominal:      General: Bowel sounds are normal  There is no distension  Palpations: Abdomen is soft  Tenderness: There is no abdominal tenderness     Skin:     Coloration: Skin is not jaundiced or pale  Findings: Bruising (Noted some ecchymosis on her left great toe) present  No erythema or rash  Comments: Left upper extremity in sling   Neurological:      Mental Status: She is alert  Comments: Awake alert interactive, pleasant and cooperative but seems a bit forgetful at times   Psychiatric:         Mood and Affect: Mood normal        Additional Data:     Labs:    Results from last 7 days   Lab Units 08/09/20  0847   WBC Thousand/uL 13 58*   HEMOGLOBIN g/dL 13 4   HEMATOCRIT % 41 6   PLATELETS Thousands/uL 215   LYMPHO PCT % 10*   MONO PCT % 2*   EOS PCT % 0     Results from last 7 days   Lab Units 08/10/20  0647   SODIUM mmol/L 138   POTASSIUM mmol/L 3 7   CHLORIDE mmol/L 105   CO2 mmol/L 27   BUN mg/dL 21   CREATININE mg/dL 0 39*   ANION GAP mmol/L 6   CALCIUM mg/dL 8 4   ALBUMIN g/dL 3 2*   TOTAL BILIRUBIN mg/dL 0 60   ALK PHOS U/L 50   ALT U/L 22   AST U/L 19   GLUCOSE RANDOM mg/dL 119                       * I Have Reviewed All Lab Data Listed Above    * Additional Pertinent Lab Tests Reviewed: No New Labs Available For Today     Imaging:    Imaging Reports Reviewed Today Include:   Imaging Personally Reviewed by Myself Includes:      Recent Cultures (last 7 days):           Last 24 Hours Medication List:   Current Facility-Administered Medications   Medication Dose Route Frequency Provider Last Rate    acetaminophen  975 mg Oral Q8H AMARILIS Lopez-MERARI      ALPRAZolam  0 25 mg Oral TID PRN AMARILIS Lopez-C      cefazolin  2,000 mg Intravenous Q8H AMARILIS Lopez-C 2,000 mg (08/12/20 0226)    cholecalciferol  1,000 Units Oral Daily AMARILIS Lopez-MERARI      docusate sodium  100 mg Oral BID Katia Chacon, PA-C      enoxaparin  40 mg Subcutaneous Daily Katia Snsocrates PA-C      HYDROmorphone  0 5 mg Intravenous Q4H PRN Katia Snsocrates, PA-C      melatonin  3 mg Oral HS Katia Snuffdorota, PA-C      methocarbamol  500 mg Oral Q6H PRN Montalvo Snuffdorota, PA-C      ondansetron  4 mg Intravenous Q6H PRN Montalvo Snuffer, PA-C      oxybutynin  5 mg Oral Daily Yumiko Pod, PA-C      oxyCODONE  2 5 mg Oral Q4H PRN Yumiko Pod, PA-C      oxyCODONE  5 mg Oral Q4H PRN Yumiko Pod, PA-C      PARoxetine  20 mg Oral Daily Yumiko Pod, PA-C      polyethylene glycol  17 g Oral Daily Yumiko Pod, PA-C      senna  1 tablet Oral BID Yumiko Pod, PA-C          Today, Patient Was Seen By: Van Houston PA-C    ** Please Note: Dictation voice to text software may have been used in the creation of this document   **

## 2020-08-12 NOTE — PROGRESS NOTES
Progress Note - Orthopedics   Enio Cota 72 y o  female MRN: [id number]  Unit/Bed#: S -01      Subjective:    72 y  o female seen and evaluated, postop day 1 status post left humeral shaft ORIF  Patient reports soreness in the arm and that the arm feels heavy  She denies any numbness or tingling  She is able to flex and extend her wrist and fingers  Patient notes concerns with going home since she uses both arms to transfer to the bathroom  No fevers or chills, no chest pain or shortness of breath      Labs:  0   Lab Value Date/Time    HCT 41 6 08/09/2020 0847    HCT 37 7 08/19/2018 0634    HCT 39 4 08/18/2018 0543    HGB 13 4 08/09/2020 0847    HGB 11 9 08/19/2018 0634    HGB 12 1 08/18/2018 0543    INR 0 99 08/16/2018 1434    WBC 13 58 (H) 08/09/2020 0847    WBC 6 94 08/19/2018 0634    WBC 8 79 08/18/2018 0543       Meds:    Current Facility-Administered Medications:     acetaminophen (TYLENOL) tablet 975 mg, 975 mg, Oral, Q8H, Lequita Peel, PA-C, 975 mg at 08/12/20 0529    ALPRAZolam Audery Aiken) tablet 0 25 mg, 0 25 mg, Oral, TID PRN, Lequita Peel, PA-C, 0 25 mg at 08/11/20 1142    ceFAZolin (ANCEF) IVPB (premix) 2,000 mg 50 mL, 2,000 mg, Intravenous, Q8H, Lequita Peel, PA-C, Last Rate: 100 mL/hr at 08/12/20 0226, 2,000 mg at 08/12/20 0226    cholecalciferol (VITAMIN D3) tablet 1,000 Units, 1,000 Units, Oral, Daily, Lequita Peel, PA-C, 1,000 Units at 08/12/20 0811    docusate sodium (COLACE) capsule 100 mg, 100 mg, Oral, BID, Lequita Peel, PA-C, 100 mg at 08/12/20 0810    enoxaparin (LOVENOX) subcutaneous injection 40 mg, 40 mg, Subcutaneous, Daily, Lequita Peel, PA-C, 40 mg at 08/12/20 0811    HYDROmorphone (DILAUDID) injection 0 5 mg, 0 5 mg, Intravenous, Q4H PRN, Lequita Peel, PA-C    melatonin tablet 3 mg, 3 mg, Oral, HS, Lequita Peel, PA-C, 3 mg at 08/11/20 2255    methocarbamol (ROBAXIN) tablet 500 mg, 500 mg, Oral, Q6H PRN, Lequita Peel, PA-C, 500 mg at 08/10/20 2350    ondansetron (ZOFRAN) injection 4 mg, 4 mg, Intravenous, Q6H PRN, Brayd Keep, PA-C    oxybutynin [facility]) tablet 5 mg, 5 mg, Oral, Daily, Brady Keep, PA-C, 5 mg at 08/12/20 0810    oxyCODONE (ROXICODONE) IR tablet 2 5 mg, 2 5 mg, Oral, Q4H PRN, Brady Keep, PA-C    oxyCODONE (ROXICODONE) IR tablet 5 mg, 5 mg, Oral, Q4H PRN, Brady Keep, PA-C, 5 mg at 08/12/20 0703    PARoxetine (PAXIL) tablet 20 mg, 20 mg, Oral, Daily, Brady Keep, PA-C, 20 mg at 08/12/20 0810    polyethylene glycol (MIRALAX) packet 17 g, 17 g, Oral, Daily, Brady Keep, PA-C, 17 g at 08/12/20 0201    senna (SENOKOT) tablet 8 6 mg, 1 tablet, Oral, BID, Brady Keep, PA-C, 8 6 mg at 08/12/20 0810    Blood Culture:   No results found for: BLOODCX    Wound Culture:   No results found for: WOUNDCULT    Ins and Outs:  I/O last 24 hours: In: 1300 [I V :1300]  Out: 500 [Urine:300; Blood:200]          Physical:  Vitals:    08/12/20 0703   BP: 101/50   Pulse: 88   Resp: 18   Temp: 99 4 °F (37 4 °C)   SpO2: 93%     Musculoskeletal: left Upper Extremity  · Skin warm well perfused, mild edema over the upper extremity  · Dressing clean, dry, intact  · TTP over posterior incision site  · SILT m/r/u  Motor intact ain/pin/m/r/u, 2+ radial pulse    Assessment:    65 y  o female postop day 1 status post left humeral shaft ORIF     Plan:  · NWB left upper extremity, may use arm for ADLs  · May discontinue sling when comfortable  · OT eval and treat  · Pain control primary team  · DVT ppx  · Maintain dressings today, if patient is still here in 1-2 days will change   · Dispo: Ortho will follow, begin discharge [address]

## 2020-08-12 NOTE — PHYSICAL THERAPY NOTE
PHYSICAL THERAPY EVALUATION  Time: 3747-5589    NAME:  Enio Cota  DATE: 08/12/20    AGE:   72 y o  Mrn:   [id number]  Length Of Stay: 2    ADMIT DX:  Arm injury [S49 90XA]  Humerus fracture [S42 309A]    Past Medical History:   Diagnosis Date    Anxiety     Scoliosis      Past Surgical History:   Procedure Laterality Date    BACK SURGERY  1974    velarde damon in back for scoliosis    COLONOSCOPY  01/01/2013    ELBOW FRACTURE REPAIR Left 8/11/2020    Procedure: ORIF left humeral shaft, ulnar nerve release, tenotomy anconeus epitrochlearis, radial nerve dissection;  Surgeon: Benji Raygoza MD;  Location: [facility];  Service: Orthopedics    JOINT REPLACEMENT      left hip and knee    KIDNEY STONE SURGERY         Performed at least 2 patient identifiers during session: Name and Birthday       08/12/20 1505   Note Type   Note type Eval/Treat   Pain Assessment   Pain Assessment Tool 0-10   Pain Score 7   Pain Location/Orientation Orientation: Left; Location: Arm   Pain Onset/Description Onset: Ongoing;Frequency: Constant/Continuous; Descriptor: Aching;Descriptor: Sore   Effect of Pain on Daily Activities Limits activity tolerance, limits functional mobility    Patient's Stated Pain Goal No pain   Hospital Pain Intervention(s) Medication (See MAR); Repositioned; Ambulation/increased activity; Emotional support;Elevated   Home Living   Type of [address] One level;Performs ADLs on one level; Able to live on main level with bedroom/bathroom;Stairs to enter with rails  (2+1 JJ with R HR )   Bathroom Shower/Tub Tub/shower unit   Bathroom Toilet Standard   Bathroom Equipment Grab bars in shower; Tub transfer bench;Commode;Grab bars around toilet   Bathroom Accessibility Accessible via wheelchair   [address] (Comment); Electric scooter;Quad cane; Reacher;Sock aid;Long-handled shoehorn;Grab bars; Wheelchair-manual  (rollator walker; recliner lift chair; transport WC; BSC) Prior Function   Level of [place] Needs assistance with IADLs; Independent with ADLs and functional mobility  (Use of RW for functional mobility PTA )   Lives With Spouse   Receives Help From Family   ADL Assistance Independent   IADLs Needs assistance   Falls in the last 6 months 1 to 4   Vocational On disability   Restrictions/Precautions   Weight Bearing Precautions Per Order Yes   LUE Weight Bearing Per Order NWB   Braces or Orthoses Sling  (L UE )   Other Precautions Chair Alarm; Bed Alarm;Multiple lines; Fall Risk;Pain;WBS   General   Family/Caregiver Present Yes  (Spouse (Darryl) present t/o session  )   Cognition   Overall Cognitive Status Impaired   Arousal/Participation Alert   Attention Attends with cues to redirect   Orientation Level Oriented to person;Oriented to place;Oriented to situation   Memory Decreased recall of precautions   Following Commands Follows one step commands with increased time or repetition  (Pt requires increased time for command following d/t anxiety)   RUE Assessment   RUE Assessment X  (Limited shoulder flexion however WFL )   RUE Strength   RUE Overall Strength Deficits;Due to pain; Within Functional Limits - able to perform ADL tasks with strength   LUE Strength   LUE Overall Strength Deficits;Due to  precautions;Due to pain   RLE Assessment   RLE Assessment X   RLE Overall AROM   R Hip Flexion Limited due to weakness, PROM WNL   R Hip ABduction WFL   R Hip ADduction WFL   R Knee Flexion WFL   R Knee Extension WFL   R Ankle Dorsiflexion WFL   Strength RLE   R Hip Flexion 1/5   R Hip ABduction 3+/5   R Hip ADduction 3+/5   R Knee Flexion 3+/5   R Knee Extension 3+/5   R Ankle Dorsiflexion 3+/5   LLE Assessment   LLE Assessment X   LLE Overall AROM   L Hip Flexion Limited due to weakness, PROM WNL   L Hip ABduction WFL   L Hip ADduction WFL   L Knee Flexion WFL   L Ankle Dorsiflexion WFL   L Knee Extension WFL   Strength LLE   L Hip Flexion 1/5   L Hip ADduction 3+/5   L Hip ABduction 3+/5   L Knee Flexion 4/5   L Knee Extension 4/5   L Ankle Dorsiflexion 4/5   Coordination   Sensation WFL   Light Touch   RLE Light Touch Grossly intact   LLE Light Touch Grossly intact   Sharp/Dull   RLE Sharp/Dull Grossly intact   LLE Sharp/Dull Grossly intact   Bed Mobility   Additional Comments Bed mobility not assessed, pt presents and was left seated in bedside recliner chair  Transfers   Sit to Stand 3  Moderate assistance   Additional items Assist x 1; Armrests; Increased time required;Verbal cues   Stand to Sit 3  Moderate assistance   Additional items Assist x 1; Armrests; Increased time required;Verbal cues   Stand pivot Unable to assess   Ambulation/Elevation   Gait pattern Not appropriate; Not tested   Balance   Static Sitting Good   Dynamic Sitting Fair -   Static Standing Poor -   Dynamic Standing Poor -   Ambulatory Zero   Endurance Deficit   Endurance Deficit Yes   Activity Tolerance   Activity Tolerance Patient limited by fatigue;Patient limited by pain   Medical Staff Made Aware Coordinated care with OT Ynes    Nurse Made Aware Yes, spoke with JOHANN Silva    Assessment   Prognosis Guarded   Problem List Decreased strength;Decreased range of motion;Decreased endurance; Impaired balance;Decreased mobility; Decreased cognition; Impaired judgement;Decreased safety awareness;Pain;Orthopedic restrictions   Assessment Pt seen for PT evaluation, after clearance by JOHANN Montejo, with NWB to R UE precautions  Pt admitted on 8/9/2020 s/p fall resulting in L humeral fracture, and dx of Closed fracture of distal end of humerus  8/11/2020 pt underwent L humeral shaft ORIF by Dr Viri Chan  Comorbidities affecting pt's functional performance include: anxiety, depression, falls, JOVANI, TKA and joint replacement, urinary incontinence, scoliosis   Personal factors affecting patient at time of initial evaluation include: ambulating with assistive device, stairs to enter home, inability to ambulate household distances, inability to navigate community distances, inability to navigate level surfaces without external assistance, inability to perform dynamic tasks in community, limited home support, positive fall history, anxiety, depression, inability to perform physical activity, limited insight into impairments, inability to perform ADLS and inability to perform IADLS   Prior to admission, patient was independent with functional mobility with use of RW , independent with ADLS, requiring assist for IADLS, living with her  Lisa Rodgers) in a single level home with 2+1 steps to enter, ambulating household distance and home with family assist  The Barthel Index was used as a functional outcome tool presenting with a score of 25 indicating severe limitations of functional mobility and ADLS  Pt is at risk of falls due to multiple comorbidities, history of previous falls, impaired balance, impaired insight/safety awareness, acuity of medical illness, use of assistive device, varying levels of pain  and newly required WB precautions  Pt's clinical presentation is currently evolving as seen in patient's presentation of changing level of pain, varying levels of cognitive performance, increased fall risk, new onset of impairment of functional mobility, decreased endurance and new onset of weakness  This PT evaluation requires clinical decision making of high complexity, based on multiple medical/physiological/functional factors which affect pt's performance with evaluation and therapist judgement for future treatment sessions (if applicable)  Pt would benefit from continued PT treatment in order to address above impairments, decrease risk of falls, maximize independence with functional mobility, and ensure safety with mobility for transition to next level of care  Based on pt presentation and impairments, pt would most appropriately benefit from post acute STR upon d/c     Barriers to Discharge Decreased caregiver support; Inaccessible home environment   Goals   Patient Goals "I really don't want to go to a rehab, I want to go home "    STG Expiration Date 08/26/20   Short Term Goal #1 1  Pt will complete all bed mobility in hospital bed with PAYAL 1 person, in order to promote increased OOB functional mobility to improve overall activity tolerance  2  Pt will complete bed to/from [facility] transfer with [address] person and LRAD, in order to increase safety with functional mobility  3  PT to assess ambulation/WC mobility as deemed appropriate  4  Pt will improve B LE strength to >/= 4/5 MMT throughout, in order to increase safety with functional mobility and decrease risk of falls  6  Pt will improve Barthel Index score to >/= 35/100 in order to increase independence and decrease risk of falls  7  Pt will demonstrate understanding and independence with LE strengthening HEP  8  Pt will improve standing balance by >/= 1/2 grade in order to promote safety and increased independence with mobility  9  Pt will tolerate >3 hrs OOB in chair with good endurance evidenced by no significant change in vitals or behavior  10  Pt will demonstrate understanding and application of NWB to R UE with functional mobility 100% of session  PT Treatment Day 1  (See below for treatment note )   Plan   Treatment/Interventions Functional transfer training;LE strengthening/ROM; Therapeutic exercise; Endurance training;Bed mobility;Gait training;Spoke to nursing;Spoke to case management;OT;Family;Continued evaluation;Patient/family training;Equipment eval/education   PT Frequency 5x/wk   Recommendation   PT Discharge Recommendation Post-Acute Rehabilitation Services   Equipment Recommended Other (Comment)  (TBD pending progress; pt anticipated to be WC level on d/c )   Additional Comments 2 Recommend co-treat with OT services to maximize safety and activity to incorporate functional mobility training and ADLs      Modified Hudson Scale   Modified Hudson Scale 4   Barthel Index   Feeding 5 Bathing 0   Grooming Score 0   Dressing Score 5   Bladder Score 0   Bowels Score 10   Toilet Use Score 0   Transfers (Bed/Chair) Score 5   Mobility (Level Surface) Score 0   Stairs Score 0   Barthel Index Score 25       Pt would most appropriately benefit from STR upon d/c from the acute care setting, however pt and spouse insist on returning to home/refusing STR recommendation  IF pt returns to home, pt anticipated to require heavy assist for all functional mobility and ADLs  Pt anticipated to require WC level functional mobility due to WB precaution to L UE, resulting in inability to utilize RW for ambulation  If returning to home, pt would require hospital bed and possibly dependent means of transferring surface pending progress with acute care therapy  Pt would require extensive home health PT services  Pt may/will require a hospital bed for the following reasons:Pt requries positioning of the body in ways not feasible with an ordinary bed in order to alleviate pain, Pt requires a bed height different than a fixed height hospital bed to permit transfers to chair, WC or standing position and Pt requires frequent change in body position and/or has an immediate need for a change in position      PHYSICAL THERAPY TREATMENT NOTE:    TIME IN: 1437  TIME OUT: 1505  TOTAL TREATMENT TIME: 28 minutes      S: "I just really don't want to go to a rehab " "Royal Long will bump me up the stairs in the wheelchair " "I have all the equipment you could imagine at home "     O: Pt seen for treatment with focus on transfer training  Pt c/o continued pain in L UE however is agreeable to participate  Pt continues to verbalize high level of anxiety/fear - appears to be more comfortable when her  is there next to her  Pt requires MODA for sit<>stand from recliner chair, cues for use of R UE on armrest and to maintain NWB to L UE  Pt unable to tolerate standing any measurable length of time   Sit<>stand from chair completed x3 trials, each with MODA and therapist blockage of R knee due to buckling  Therapist educated pt on donning/doffing L UE sling, requires maxA during pt return demonstration  Pt requested to stay seated in bedside recliner chair, chair alarm engaged and needs within reach  Care returned to RN      A: Regarding functional mobility, pt presents severely below her baseline level of functional mobility  Pt limited by weakness, pain, and current WBing precautions to L UE  Pt and spouse with overall poor insight to deficits/functional impairments - currently refusing STR and insisting on return to home, despite therapist (as well as other HCPs) education on safety with mobility for return to home  P: Recommend post acute STR once medically cleared for d/c from the acute care setting  Will continue PT POC as able and appropriate  Next session, plan for co-tx with OT services to maximize safety and activity tolerance       Sarah Valiente, PT, DPT  8/12/2020

## 2020-08-12 NOTE — SOCIAL WORK
CM spoke with patient and  at the bedside  CM updated patient and  per PT/OT, the recommendation at discharge is inpatient rehab  CM educated patient and  on [place] [facility]  CM discussed the difference between acute and subacute rehab  CM discussed different acute rehab options in the area  Patient is requesting referrals to [address] [place] [address], [facility] and [facility]  Patient declining inpatient rehab to a SNF  CM discussed if patient is not appropriate for acute rehab, setting up home services  Patient understands if appropriate for acute rehab, insurance authorization would be initiated  Patient and  expressed understanding at the bedside  All questions/concerns answered at this time  CM updated SLIM with plan of care

## 2020-08-12 NOTE — ASSESSMENT & PLAN NOTE
· X ray finding of left comminuted distal humeral fracture, s/p fall; no need for aggressive tika-operative IVF which patient reports led to iatrogenic volume overload in past   · Pain control regimen  · DVT prophylaxis   · Orthopedic evaluation appreciated  · POD #1 s/p ORIF  · OT evaluation pending  · Noted other minor injuries from her fall  · Minimal blood loss reported from op report therefore not likely to need repeat CBC today unless symptoms develop; reconsider labs tomorrow if she needs to stay

## 2020-08-12 NOTE — PLAN OF CARE
Problem: PHYSICAL THERAPY ADULT  Goal: Performs mobility at highest level of function for planned discharge setting  See evaluation for individualized goals  Description: Treatment/Interventions: Functional transfer training, LE strengthening/ROM, Therapeutic exercise, Endurance training, Bed mobility, Gait training, Spoke to nursing, Spoke to case management, OT, Family, Continued evaluation, Patient/family training, Equipment eval/education  Equipment Recommended: Other (Comment)(TBD pending progress; pt anticipated to be WC level on d/c )       See flowsheet documentation for full assessment, interventions and recommendations  Outcome: Progressing  Note: Prognosis: Guarded  Problem List: Decreased strength, Decreased range of motion, Decreased endurance, Impaired balance, Decreased mobility, Decreased cognition, Impaired judgement, Decreased safety awareness, Pain, Orthopedic restrictions  Assessment: Pt seen for PT evaluation, after clearance by JOHANN Samuels, with NWB to R UE precautions  Pt admitted on 8/9/2020 s/p fall resulting in L humeral fracture, and dx of Closed fracture of distal end of humerus  8/11/2020 pt underwent L humeral shaft ORIF by Dr Juan Ford  Comorbidities affecting pt's functional performance include: anxiety, depression, falls, JOVANI, TKA and joint replacement, urinary incontinence, scoliosis  Personal factors affecting patient at time of initial evaluation include: ambulating with assistive device, stairs to enter home, inability to ambulate household distances, inability to navigate community distances, inability to navigate level surfaces without external assistance, inability to perform dynamic tasks in community, limited home support, positive fall history, anxiety, depression, inability to perform physical activity, limited insight into impairments, inability to perform ADLS and inability to perform IADLS    Prior to admission, patient was independent with functional mobility with use of RW , independent with ADLS, requiring assist for IADLS, living with her  Hector Garg) in a single level home with 2+1 steps to enter, ambulating household distance and home with family assist  The Barthel Index was used as a functional outcome tool presenting with a score of 25 indicating severe limitations of functional mobility and ADLS  Pt is at risk of falls due to multiple comorbidities, history of previous falls, impaired balance, impaired insight/safety awareness, acuity of medical illness, use of assistive device, varying levels of pain  and newly required WB precautions  Pt's clinical presentation is currently evolving as seen in patient's presentation of changing level of pain, varying levels of cognitive performance, increased fall risk, new onset of impairment of functional mobility, decreased endurance and new onset of weakness  This PT evaluation requires clinical decision making of high complexity, based on multiple medical/physiological/functional factors which affect pt's performance with evaluation and therapist judgement for future treatment sessions (if applicable)  Pt would benefit from continued PT treatment in order to address above impairments, decrease risk of falls, maximize independence with functional mobility, and ensure safety with mobility for transition to next level of care  Based on pt presentation and impairments, pt would most appropriately benefit from post acute STR upon d/c    Barriers to Discharge: Decreased caregiver support, Inaccessible home environment     PT Discharge Recommendation: [address]          See flowsheet documentation for full assessment

## 2020-08-12 NOTE — ANESTHESIA PROCEDURE NOTES
Peripheral Block    Patient location during procedure: post-op  Start time: 8/11/2020 8:00 PM  Reason for block: at surgeon's request and post-op pain management  Staffing  Anesthesiologist: Claude Mallow, MD  Performed: anesthesiologist   Preanesthetic Checklist  Completed: patient identified, site marked, surgical consent, pre-op evaluation, timeout performed, IV checked, risks and benefits discussed and monitors and equipment checked  Peripheral Block  Prep: ChloraPrep  Patient monitoring: continuous pulse ox, frequent blood pressure checks, heart rate and cardiac monitor  Block type: supraclavicular  Laterality: left  Injection technique: single-shot  Procedures: ultrasound guided, Ultrasound guidance required for the procedure to increase accuracy and safety of medication placement and decrease risk of complications    Ultrasound permanent image savedropivacaine (NAROPIN) 0 5 % perineural infiltration, 30 mL  lidocaine (PF) (XYLOCAINE-MPF) 1 % infiltration, 5 mL  Needle  Needle type: [employer]   Needle gauge: 22 G  Needle length: 10 cm  Needle localization: anatomical landmarks and ultrasound guidance  Needle insertion depth: 4 cm  Assessment  Injection assessment: incremental injection, local visualized surrounding nerve on ultrasound, no paresthesia on injection and negative aspiration for heme  Paresthesia pain: none  Heart rate change: no  Slow fractionated injection: yes  Post-procedure:  site cleaned  patient tolerated the procedure well with no immediate complications

## 2020-08-12 NOTE — PLAN OF CARE
Problem: Potential for Falls  Goal: Patient will remain free of falls  Description: INTERVENTIONS:  - Assess patient frequently for physical needs  -  Identify cognitive and physical deficits and behaviors that affect risk of falls  -  [place] fall precautions as indicated by assessment   - Educate patient/family on patient safety including physical limitations  - Instruct patient to call for assistance with activity based on assessment  - Modify environment to reduce risk of injury  - Consider OT/PT consult to assist with strengthening/mobility  Outcome: Progressing     Problem: Prexisting or High Potential for Compromised Skin Integrity  Goal: Skin integrity is maintained or improved  Description: INTERVENTIONS:  - Identify patients at risk for skin breakdown  - Assess and monitor skin integrity  - Assess and monitor nutrition and hydration status  - Monitor labs   - Assess for incontinence   - Turn and reposition patient  - Assist with mobility/ambulation  - Relieve pressure over bony prominences  - Avoid friction and shearing  - Provide appropriate hygiene as needed including keeping skin clean and dry  - Evaluate need for skin moisturizer/barrier cream  - Collaborate with interdisciplinary team   - Patient/family teaching  - Consider wound care consult   Outcome: Progressing     Problem: Nutrition/Hydration-ADULT  Goal: Nutrient/Hydration intake appropriate for improving, restoring or maintaining nutritional needs  Description: Monitor and assess patient's nutrition/hydration status for malnutrition  Collaborate with interdisciplinary team and initiate plan and interventions as ordered  Monitor patient's weight and dietary intake as ordered or per policy  Utilize nutrition screening tool and intervene as necessary  Determine patient's food preferences and provide high-protein, high-caloric foods as appropriate       INTERVENTIONS:  - Monitor oral intake, urinary output, labs, and treatment plans  - Assess nutrition and hydration status and recommend course of action  - Evaluate amount of meals eaten  - Assist patient with eating if necessary   - Allow adequate time for meals  - Recommend/ encourage appropriate diets, oral nutritional supplements, and vitamin/mineral supplements  - Order, calculate, and assess calorie counts as needed  - Recommend, monitor, and adjust tube feedings and TPN/PPN based on assessed needs  - Assess need for intravenous fluids  - Provide specific nutrition/hydration education as appropriate  - Include patient/family/caregiver in decisions related to nutrition  Outcome: Progressing     Problem: PAIN - ADULT  Goal: Verbalizes/displays adequate comfort level or baseline comfort level  Description: Interventions:  - Encourage patient to monitor pain and request assistance  - Assess pain using appropriate pain scale  - Administer analgesics based on type and severity of pain and evaluate response  - Implement non-pharmacological measures as appropriate and evaluate response  - Consider cultural and social influences on pain and pain management  - Notify physician/advanced practitioner if interventions unsuccessful or patient reports new pain  Outcome: Progressing     Problem: INFECTION - ADULT  Goal: Absence or prevention of progression during hospitalization  Description: INTERVENTIONS:  - Assess and monitor for signs and symptoms of infection  - Monitor lab/diagnostic results  - Monitor all insertion sites, i e  indwelling lines, tubes, and drains  - Monitor endotracheal if appropriate and nasal secretions for changes in amount and color  - [place] appropriate cooling/warming therapies per order  - Administer medications as ordered  - Instruct and encourage patient and family to use good hand hygiene technique  - Identify and instruct in appropriate isolation precautions for identified infection/condition  Outcome: Progressing  Goal: Absence of fever/infection during neutropenic period  Description: INTERVENTIONS:  - Monitor WBC    Outcome: Progressing     Problem: SAFETY ADULT  Goal: Patient will remain free of falls  Description: INTERVENTIONS:  - Assess patient frequently for physical needs  -  Identify cognitive and physical deficits and behaviors that affect risk of falls    -  [place] fall precautions as indicated by assessment   - Educate patient/family on patient safety including physical limitations  - Instruct patient to call for assistance with activity based on assessment  - Modify environment to reduce risk of injury  - Consider OT/PT consult to assist with strengthening/mobility  Outcome: Progressing  Goal: Maintain or return to baseline ADL function  Description: INTERVENTIONS:  -  Assess patient's ability to carry out ADLs; assess patient's baseline for ADL function and identify physical deficits which impact ability to perform ADLs (bathing, care of mouth/teeth, toileting, grooming, dressing, etc )  - Assess/evaluate cause of self-care deficits   - Assess range of motion  - Assess patient's mobility; develop plan if impaired  - Assess patient's need for assistive devices and provide as appropriate  - Encourage maximum independence but intervene and supervise when necessary  - Involve family in performance of ADLs  - Assess for home care needs following discharge   - Consider OT consult to assist with ADL evaluation and planning for discharge  - Provide patient education as appropriate  Outcome: Progressing  Goal: Maintain or return mobility status to optimal level  Description: INTERVENTIONS:  - Assess patient's baseline mobility status (ambulation, transfers, stairs, etc )    - Identify cognitive and physical deficits and behaviors that affect mobility  - Identify mobility aids required to assist with transfers and/or ambulation (gait belt, sit-to-stand, lift, walker, cane, etc )  - [place] fall precautions as indicated by assessment  - Record patient progress and toleration of activity level on Mobility SBAR; progress patient to next Phase/Stage  - Instruct patient to call for assistance with activity based on assessment  - Consider rehabilitation consult to assist with strengthening/weightbearing, etc   Outcome: Progressing     Problem: DISCHARGE PLANNING  Goal: Discharge to home or other facility with appropriate resources  Description: INTERVENTIONS:  - Identify barriers to discharge w/patient and caregiver  - Arrange for needed discharge resources and transportation as appropriate  - Identify discharge learning needs (meds, wound care, etc )  - Arrange for interpretive services to assist at discharge as needed  - Refer to Case Management Department for coordinating discharge planning if the patient needs post-hospital services based on physician/advanced practitioner order or complex needs related to functional status, cognitive ability, or social support system  Outcome: Progressing     Problem: Knowledge Deficit  Goal: Patient/family/caregiver demonstrates understanding of disease process, treatment plan, medications, and discharge instructions  Description: Complete learning assessment and assess knowledge base    Interventions:  - Provide teaching at level of understanding  - Provide teaching via preferred learning methods  Outcome: Progressing     Problem: MUSCULOSKELETAL - ADULT  Goal: Maintain or return mobility to safest level of function  Description: INTERVENTIONS:  - Assess patient's ability to carry out ADLs; assess patient's baseline for ADL function and identify physical deficits which impact ability to perform ADLs (bathing, care of mouth/teeth, toileting, grooming, dressing, etc )  - Assess/evaluate cause of self-care deficits   - Assess range of motion  - Assess patient's mobility  - Assess patient's need for assistive devices and provide as appropriate  - Encourage maximum independence but intervene and supervise when necessary  - Involve family in performance of ADLs  - Assess for home care needs following discharge   - Consider OT consult to assist with ADL evaluation and planning for discharge  - Provide patient education as appropriate  Outcome: Progressing  Goal: Maintain proper alignment of affected body part  Description: INTERVENTIONS:  - Support, maintain and protect limb and body alignment  - Provide patient/ family with appropriate education  Outcome: Progressing

## 2020-08-12 NOTE — OCCUPATIONAL THERAPY NOTE
Occupational Therapy Evaluation     Patient Name: Tee Castañeda  RSSIU'B Date: 8/12/2020  Problem List  Principal Problem:    Closed fracture of distal end of humerus  Active Problems:    Mixed anxiety and depressive disorder    Constipation    Abnormal CT scan    Past Medical History  Past Medical History:   Diagnosis Date    Anxiety     Scoliosis      Past Surgical History  Past Surgical History:   Procedure Laterality Date    BACK SURGERY  1974    velarde damon in back for scoliosis    COLONOSCOPY  01/01/2013    ELBOW FRACTURE REPAIR Left 8/11/2020    Procedure: ORIF left humeral shaft, ulnar nerve release, tenotomy anconeus epitrochlearis, radial nerve dissection;  Surgeon: Courtney Mccarthy MD;  Location: [facility];  Service: Orthopedics    JOINT REPLACEMENT      left hip and knee    KIDNEY STONE SURGERY           08/12/20 1237   Note Type   Note type Eval/Treat   Restrictions/Precautions   Weight Bearing Precautions Per Order Yes   LUE Weight Bearing Per Order NWB   Braces or Orthoses Sling; Other (Comment)  (L UE)   Other Precautions Chair Alarm;Cognitive; Bed Alarm;WBS;Multiple lines; Fall Risk;Pain  (purewick catheter, IV (RN disconnected during eval); L UE NW)   Pain Assessment   Pain Assessment Tool 0-10   Pain Score 7   Pain Location/Orientation Orientation: Left; Location: Arm   Effect of Pain on Daily Activities limits activity tolerance and I w/ ADL performance   Patient's Stated Pain Goal No pain   Hospital Pain Intervention(s) Repositioned; Ambulation/increased activity; Emotional support  (RN medicated pt prior to session)   [address]; Other (Comment)  (2+1 JJ)   Home Layout One level;Performs ADLs on one level; Able to live on main level with bedroom/bathroom   Bathroom Shower/Tub Tub/shower unit   Bathroom Toilet Standard   Bathroom Equipment Grab bars in shower; Shower chair;Commode;Grab bars around toilet   Bathroom Accessibility Accessible;Accessible via walker   Home Equipment Walker;Cane;Reacher;Grab bars; Wheelchair-manual  (used walker PTA)   Additional Comments Pt reports living w/ spouse, Guadalupe Russell in [address]    Prior Function   Level of Lane Needs assistance with IADLs   Lives With Spouse; Other (Comment)  (Darryl)   Receives Help From Family   ADL Assistance Independent   IADLs Needs assistance   Falls in the last 6 months 1 to 4   Vocational On disability   Comments Pt reports using RW for functional mobility and able to complete ADL w/ out assistance   Lifestyle   Autonomy Pt reports I w/ ADL using RW for functional mobility    Reciprocal Relationships Supportive spouse, Guadalupe Russell present during eval    Service to Others Pt reports on disability and worked as unit clerk at [employer] reports enjoying knitting   ADL   Eating Assistance 5  Supervision/Setup   Eating Deficit Setup;Supervision/safety; Increased time to complete;Verbal cueing   Grooming Assistance 4  Minimal Assistance   Grooming Deficit Setup;Verbal cueing;Supervision/safety; Increased time to complete; Teeth care  (seated at EOB)   UB Bathing Assistance Unable to assess   LB Bathing Assistance Unable to assess   UB Dressing Assistance 2  Maximal Assistance   UB Dressing Deficit Pull around back; Fasteners; Thread LUE;Setup;Steadying;Supervision/safety; Increased time to complete;Verbal cueing   LB Dressing Assistance 2  Maximal Assistance   LB Dressing Deficit Don/doff R sock; Don/doff L sock; Setup;Steadying; Increased time to complete; Requires assistive device for steadying   Additional Comments + time and cues for tech, ecnouragement to actively participate in ADL  Therapist educated pt on L UE NWB  Pt engaged in UBD while seated at EOB w/ max A   Bed Mobility   Supine to Sit 2  Maximal assistance   Additional items HOB elevated;Assist x 1; Increased time required;LE management;Verbal cues; Bedrails  (to pt's R)   Sit to Supine Unable to assess   Additional Comments Pt seated OOB in chair post eval w/ needs met, call bell in reach and chair alarm activated  Transfers   Sit to Stand 3  Moderate assistance  (mod HHA x2 2X vs mod A x1 3rd trial from chair)   Additional items Assist x 2; Increased time required;Verbal cues   Stand to Sit 3  Moderate assistance   Additional items Assist x 1;Assist x 2; Increased time required;Verbal cues   Stand pivot 2  Maximal assistance  (max HHA)   Additional items Assist x 1; Increased time required;Verbal cues;Armrests  (to pt's R)   Functional Mobility   Additional Comments Will conitnue to assess as appropriate using unilateral devie   Balance   Static Sitting Fair -   Static Standing Poor -   Ambulatory   (Will continue to assess)   Activity Tolerance   Activity Tolerance Patient limited by fatigue;Patient limited by pain   Medical Staff Made Aware spoke to Marcus AGUIRRE and PTLizzy/ Nicolle handy/ orthopedics via   [facility] spoke to RN, Kathy Francisco Assessment   RUE Assessment X  (limited end / terminal ROM AG shoulder; able to complete ADL)   RUE Strength   RUE Overall Strength Deficits; Other (Comment)  (shoulder deficits (premorbid from impingement))   LUE Assessment   LUE Assessment X  (L UE NWB; limited AROM elbow due to pain, splint/ace wrap)   LUE Overall AROM   L Elbow Flexion limited AROM due to pain and splint / ace wrap L UE  Tolerated ~ 25* AAROM flexion    L Elbow Extension limited terminal / end ROM   L Mass Grasp WFL   L Wrist Flexion limited terminal / end ROM; WFL   LUE Strength   LUE Overall Strength Deficits;Due to  precautions;Due to pain   Edema   LUE Edema Non-pitting   Hand Function   Gross Motor Coordination Functional   Fine Motor Coordination Impaired  (R hand dominance)   Sensation   Light Touch Not tested   Sharp/Dull Not tested   Vision-Basic Assessment   Current Vision Wears glasses all the time   Cognition   Overall Cognitive Status Impaired   Arousal/Participation Alert; Cooperative   Attention Attends with cues to redirect   Orientation Level Oriented to person;Oriented to place;Oriented to situation   Memory Decreased recall of precautions   Following Commands Follows one step commands with increased time or repetition  (benefits from cues, + time due to fear / anxiety)   Comments Identified pt by full name and birthdate  Pt able to provide home set- up and communicate wants / needs  Verbalized understanding of L UE NWB but required A and cues to consistently maintain  Labile / tearful at times about situation and not being able to use walker  Benefits from + time and encouragement to actively participate in ADL due to fear / retreat, pain    Assessment   Limitation Decreased ADL status; Decreased UE ROM; Decreased UE strength;Decreased endurance;Decreased fine motor control;Decreased self-care trans;Decreased high-level ADLs   Assessment Pt is a 72yo female admitted to [address] [place] [address] on 8/9/2020  Pt presents w/ closed fracture of distal end of L humerus and significant PMH impacting her occupational performance including back sx for scoliosis, L JOVANI, L TKA, anxiety, B pubic rami fx (2018), R shoulder impingement  Pt presents s/p ORIF L humeral shaft, ulnar nerve release, radial nerve dissection on 8/11/2020  Active OT orders received and per orthopedics, pt is NWB L UE but may use L UE to complete ADL; elbow ROM as tolerated, and hand, wrist, digit ROM  Pt reports living w/ spouse, Binh Wright in [facility] w/ 2+1 JJ PTA  Pt reports using RW for functional mobility and I w/ ADL  Pt needs assistance w/ IADL  Upon eval, pt alert and oriented to person  Able to provide home set- up  Pt reports R hand dominance and demonstrated R UE AROM WFL to complete ADL  Verbalized understanding of L UE NWB but required A and prompts to maintain  Pt required max A to complete bed mobility supine to sit  Pt engaged in grooming w/ min A and UBD w/ max A  Pt complete LBD w;/ max A   Pt required max A to complete SPT from EOB to chair to pt's R w/ HHA  Pt presents w/ decreased L UE AROM and strength, increased pain, orthopedic restrictions, decreased sitting balance, decreased standing balance, decreased activity tolerance, decreased recall precautions, decreased standing tolerance, decreased endurance, decreased LE strength impacting her I w/ dressing, bathing, oral hygiene, functional mobility, functional transfers, activity engagement, clothing mgmt, food prep / clean up  Pt completing ADL below baseline level of I and would benefit from OT while in acute care to address deficits  From an OT perspective, pt would benefit from post acute rehab when medically stable for discharge from acute care  If pt/ spouse decline rehab, pt would benefit from w/c to max I and improve activity engagement; hospital bed to max I w/ bed mobility and assist w/ positioning and consistent physical assistance w/ ADL and functional transfers/ mobility; Home OT  Will continue to follow   Goals   Patient Goals Pt stated that she would like to return home w/ spouse, Darryl   Plan   Treatment Interventions ADL retraining;Functional transfer training; Endurance training;Patient/family training;UE strengthening/ROM; Equipment evaluation/education; Fine motor coordination activities; Compensatory technique education;Continued evaluation; Energy conservation; Activityengagement   Goal Expiration Date 08/22/20   OT Frequency 3-5x/wk   Recommendation   OT Discharge Recommendation Post-Acute Rehabilitation Services   Equipment Recommended Tub seat with back; Bedside commode; Other (comment)  (may benefit from use of w/c; hospital bed to A w/positioning)   OT - OK to Discharge   (when medically stable)   Barthel Index   Feeding 5   Bathing 0   Grooming Score 0   Dressing Score 5   Bladder Score 0   Bowels Score 10   Toilet Use Score 0   Transfers (Bed/Chair) Score 5   Mobility (Level Surface) Score 0   Stairs Score 0   Barthel Index Score 25   Modified Rocio Scale   Modified Rocio Scale 4   Pt goals to be met by 8/19/2020:  -Pt will complete bed mobility supine <> sit w/ min A X 1 to max I w/ ADL performance and minimize burden of care to return to PLOF w/ spouse    -Pt will demonstrate good attention and understanding of L UE NWB during ADL performance and functional transfers to max I w/ ADL performance    -Pt will complete UBD w/ min A x1 after set- up w/ at least fair + sitting balance to max I w/ ADL performance to return home to PLOF w/ spouse    -Pt will complete functional transfers to bed, chair, and commode using AD as needed to min A x1 to max I w/ ADL performance and improve activity engagement to return to PLOF    -Pt will demonstrate good attention and understanding of L UE AROM to increase functional use of L UE during ADL and maintain ROM to improve activity engagement to return tknitting    -Pt will demonstrate good attention and participation in continued evaluation to assess functional mobility using least restrictive device to assist in safe discharge planning    -Pt will demonstrate increased functional standing tolerance for at least 5 minutes while engaged in ADL standing at sink w/ at least fair- balance to max I w/ LBD and toileting    -Pt will complete LBD w/ min A X1 after set- up to max I w/ ADL performance    AD Wasserman/AMADOR

## 2020-08-12 NOTE — PLAN OF CARE
Problem: OCCUPATIONAL THERAPY ADULT  Goal: Performs self-care activities at highest level of function for planned discharge setting  See evaluation for individualized goals  Description: Treatment Interventions: ADL retraining, Functional transfer training, Endurance training, Patient/family training, UE strengthening/ROM, Equipment evaluation/education, Fine motor coordination activities, Compensatory technique education, Continued evaluation, Energy conservation, Activityengagement  Equipment Recommended: Tub seat with back, Bedside commode, Other (comment)(may benefit from use of w/c; hospital bed to A w/positioning)       See flowsheet documentation for full assessment, interventions and recommendations  Note: Limitation: Decreased ADL status, Decreased UE ROM, Decreased UE strength, Decreased endurance, Decreased fine motor control, Decreased self-care trans, Decreased high-level ADLs     Assessment: Pt is a 70yo female [facility] on [facility]  Pt presents w/ closed fracture of distal end of L humerus and significant PMH impacting her occupational performance including back sx for scoliosis, L JOVANI, L TKA, anxiety, B pubic rami fx (2018), R shoulder impingement  Pt presents s/p ORIF L humeral shaft, ulnar nerve release, radial nerve dissection on 8/11/2020  Active OT orders received and per orthopedics, pt is NWB L UE but may use L UE to complete ADL; elbow ROM as tolerated, and hand, wrist, digit ROM  Pt reports living w/ spouse, Marjo Guard in [address] [facility] w/ 2+1 JJ PTA  Pt reports using RW for functional mobility and I w/ ADL  Pt needs assistance w/ IADL  Upon eval, pt alert and oriented to person  Able to provide home set- up  Pt reports R hand dominance and demonstrated R UE AROM WFL to complete ADL  Verbalized understanding of L UE NWB but required A and prompts to maintain  Pt required max A to complete bed mobility supine to sit  Pt engaged in grooming w/ min A and UBD w/ max A  Pt complete LBD w;/ max A   Pt required max A to complete SPT from EOB to chair to pt's R w/ HHA  Pt presents w/ decreased L UE AROM and strength, increased pain, orthopedic restrictions, decreased sitting balance, decreased standing balance, decreased activity tolerance, decreased recall precautions, decreased standing tolerance, decreased endurance, decreased LE strength impacting her I w/ dressing, bathing, oral hygiene, functional mobility, functional transfers, activity engagement, clothing mgmt, food prep / clean up  Pt completing ADL below baseline level of I and would benefit from OT while in acute care to address deficits  From an OT perspective, pt would benefit from post acute rehab when medically stable for discharge from acute care  If pt/ spouse decline rehab, pt would benefit from w/c to max I and improve activity engagement; hospital bed to max I w/ bed mobility and assist w/ positioning and consistent physical assistance w/ ADL and functional transfers/ mobility; Home OT   Will continue to follow     OT Discharge Recommendation: Post-Acute Rehabilitation Services  OT - OK to Discharge: (when medically stable)

## 2020-08-13 VITALS
OXYGEN SATURATION: 93 % | TEMPERATURE: 98.1 F | SYSTOLIC BLOOD PRESSURE: 112 MMHG | BODY MASS INDEX: 26.7 KG/M2 | WEIGHT: 136 LBS | HEART RATE: 74 BPM | RESPIRATION RATE: 20 BRPM | HEIGHT: 60 IN | DIASTOLIC BLOOD PRESSURE: 54 MMHG

## 2020-08-13 LAB
ANION GAP SERPL CALCULATED.3IONS-SCNC: 5 MMOL/L (ref 4–13)
BASOPHILS # BLD AUTO: 0.04 THOUSANDS/ΜL (ref 0–0.1)
BASOPHILS NFR BLD AUTO: 0 % (ref 0–1)
BUN SERPL-MCNC: 15 MG/DL (ref 5–25)
CALCIUM SERPL-MCNC: 7.5 MG/DL (ref 8.3–10.1)
CHLORIDE SERPL-SCNC: 105 MMOL/L (ref 100–108)
CO2 SERPL-SCNC: 31 MMOL/L (ref 21–32)
CREAT SERPL-MCNC: 0.36 MG/DL (ref 0.6–1.3)
EOSINOPHIL # BLD AUTO: 0.12 THOUSAND/ΜL (ref 0–0.61)
EOSINOPHIL NFR BLD AUTO: 1 % (ref 0–6)
ERYTHROCYTE [DISTWIDTH] IN BLOOD BY AUTOMATED COUNT: 14.1 % (ref 11.6–15.1)
GFR SERPL CREATININE-BSD FRML MDRD: 114 ML/MIN/1.73SQ M
GLUCOSE SERPL-MCNC: 115 MG/DL (ref 65–140)
HCT VFR BLD AUTO: 32.5 % (ref 34.8–46.1)
HGB BLD-MCNC: 10.1 G/DL (ref 11.5–15.4)
IMM GRANULOCYTES # BLD AUTO: 0.04 THOUSAND/UL (ref 0–0.2)
IMM GRANULOCYTES NFR BLD AUTO: 0 % (ref 0–2)
LYMPHOCYTES # BLD AUTO: 2.94 THOUSANDS/ΜL (ref 0.6–4.47)
LYMPHOCYTES NFR BLD AUTO: 32 % (ref 14–44)
MCH RBC QN AUTO: 30.7 PG (ref 26.8–34.3)
MCHC RBC AUTO-ENTMCNC: 31.1 G/DL (ref 31.4–37.4)
MCV RBC AUTO: 99 FL (ref 82–98)
MONOCYTES # BLD AUTO: 0.95 THOUSAND/ΜL (ref 0.17–1.22)
MONOCYTES NFR BLD AUTO: 10 % (ref 4–12)
NEUTROPHILS # BLD AUTO: 5.17 THOUSANDS/ΜL (ref 1.85–7.62)
NEUTS SEG NFR BLD AUTO: 57 % (ref 43–75)
NRBC BLD AUTO-RTO: 0 /100 WBCS
PLATELET # BLD AUTO: 183 THOUSANDS/UL (ref 149–390)
PMV BLD AUTO: 10.6 FL (ref 8.9–12.7)
POTASSIUM SERPL-SCNC: 3.9 MMOL/L (ref 3.5–5.3)
RBC # BLD AUTO: 3.29 MILLION/UL (ref 3.81–5.12)
SODIUM SERPL-SCNC: 141 MMOL/L (ref 136–145)
WBC # BLD AUTO: 9.26 THOUSAND/UL (ref 4.31–10.16)

## 2020-08-13 PROCEDURE — 80048 BASIC METABOLIC PNL TOTAL CA: CPT | Performed by: NURSE PRACTITIONER

## 2020-08-13 PROCEDURE — 99239 HOSP IP/OBS DSCHRG MGMT >30: CPT | Performed by: PHYSICIAN ASSISTANT

## 2020-08-13 PROCEDURE — 99024 POSTOP FOLLOW-UP VISIT: CPT | Performed by: PHYSICIAN ASSISTANT

## 2020-08-13 PROCEDURE — 85025 COMPLETE CBC W/AUTO DIFF WBC: CPT | Performed by: NURSE PRACTITIONER

## 2020-08-13 RX ORDER — OXYBUTYNIN CHLORIDE 5 MG/1
5 TABLET ORAL DAILY
Refills: 0
Start: 2020-08-14 | End: 2020-11-09 | Stop reason: SDUPTHER

## 2020-08-13 RX ORDER — SENNOSIDES 8.6 MG
2 TABLET ORAL 2 TIMES DAILY
Qty: 60 EACH | Refills: 0 | Status: SHIPPED | OUTPATIENT
Start: 2020-08-13 | End: 2020-09-14 | Stop reason: ALTCHOICE

## 2020-08-13 RX ORDER — METHOCARBAMOL 500 MG/1
500 TABLET, FILM COATED ORAL EVERY 6 HOURS PRN
Qty: 30 TABLET | Refills: 0 | Status: SHIPPED | OUTPATIENT
Start: 2020-08-13 | End: 2020-08-18 | Stop reason: SDUPTHER

## 2020-08-13 RX ORDER — BISACODYL 10 MG
10 SUPPOSITORY, RECTAL RECTAL DAILY PRN
Status: DISCONTINUED | OUTPATIENT
Start: 2020-08-13 | End: 2020-08-13 | Stop reason: HOSPADM

## 2020-08-13 RX ORDER — OXYCODONE HYDROCHLORIDE 5 MG/1
5 TABLET ORAL EVERY 4 HOURS PRN
Qty: 20 TABLET | Refills: 0 | Status: SHIPPED | OUTPATIENT
Start: 2020-08-13 | End: 2020-08-18 | Stop reason: SDUPTHER

## 2020-08-13 RX ORDER — POLYETHYLENE GLYCOL 3350 17 G/17G
17 POWDER, FOR SOLUTION ORAL DAILY
Qty: 14 EACH | Refills: 0 | Status: SHIPPED | OUTPATIENT
Start: 2020-08-13 | End: 2020-09-14 | Stop reason: ALTCHOICE

## 2020-08-13 RX ORDER — SENNOSIDES 8.6 MG
1 TABLET ORAL ONCE
Status: DISCONTINUED | OUTPATIENT
Start: 2020-08-13 | End: 2020-08-13 | Stop reason: HOSPADM

## 2020-08-13 RX ADMIN — METHOCARBAMOL TABLETS 500 MG: 500 TABLET, COATED ORAL at 02:42

## 2020-08-13 RX ADMIN — METHOCARBAMOL TABLETS 500 MG: 500 TABLET, COATED ORAL at 12:50

## 2020-08-13 RX ADMIN — POLYETHYLENE GLYCOL 3350 17 G: 17 POWDER, FOR SOLUTION ORAL at 08:23

## 2020-08-13 RX ADMIN — OXYBUTYNIN CHLORIDE 5 MG: 5 TABLET ORAL at 08:22

## 2020-08-13 RX ADMIN — ENOXAPARIN SODIUM 40 MG: 40 INJECTION SUBCUTANEOUS at 08:23

## 2020-08-13 RX ADMIN — ALPRAZOLAM 0.25 MG: 0.25 TABLET ORAL at 13:54

## 2020-08-13 RX ADMIN — OXYCODONE HYDROCHLORIDE 2.5 MG: 5 TABLET ORAL at 12:50

## 2020-08-13 RX ADMIN — PAROXETINE 20 MG: 20 TABLET, FILM COATED ORAL at 08:23

## 2020-08-13 RX ADMIN — STANDARDIZED SENNA CONCENTRATE 8.6 MG: 8.6 TABLET ORAL at 08:23

## 2020-08-13 RX ADMIN — OXYCODONE HYDROCHLORIDE 5 MG: 5 TABLET ORAL at 02:42

## 2020-08-13 RX ADMIN — BISACODYL 10 MG: 10 SUPPOSITORY RECTAL at 11:39

## 2020-08-13 RX ADMIN — ACETAMINOPHEN 975 MG: 325 TABLET, FILM COATED ORAL at 04:43

## 2020-08-13 RX ADMIN — DOCUSATE SODIUM 100 MG: 100 CAPSULE, LIQUID FILLED ORAL at 08:23

## 2020-08-13 RX ADMIN — ACETAMINOPHEN 975 MG: 325 TABLET, FILM COATED ORAL at 12:50

## 2020-08-13 RX ADMIN — Medication 1000 UNITS: at 08:22

## 2020-08-13 NOTE — SOCIAL WORK
CM spoke with patient and  at the bedside  Patient and  updated acute rehab denied due to no medical need for a physician daily  Patient and  refusing SNF level rehab  CM discussed home therapy services with [facility] at discharge  Patient and  requesting blanket referral to [facility] in network with their insurance  Patient's  plans to transport at discharge  No other needs noted at this time  CM sent blanket referral to VNA agencies  [facility] in network  [address] unable to accept  [facility] able to accept with no HHA  [facility] able to accept with a [facility] date 8/17/2020  CM reviewed choices with patient and  re:[facility]  Patient and  requesting only PT/OT at discharge  Patient requesting [facility] [address] at discharge  Patient and  aware [facility] [address] contact information will be on AVS      All questions/concerns answered at this time  CM updated Janis Queen with plan of care

## 2020-08-13 NOTE — PLAN OF CARE
Problem: Potential for Falls  Goal: Patient will remain free of falls  Description: INTERVENTIONS:  - Assess patient frequently for physical needs  -  Identify cognitive and physical deficits and behaviors that affect risk of falls  -  [place] fall precautions as indicated by assessment   - Educate patient/family on patient safety including physical limitations  - Instruct patient to call for assistance with activity based on assessment  - Modify environment to reduce risk of injury  - Consider OT/PT consult to assist with strengthening/mobility  Outcome: Progressing     Problem: Prexisting or High Potential for Compromised Skin Integrity  Goal: Skin integrity is maintained or improved  Description: INTERVENTIONS:  - Identify patients at risk for skin breakdown  - Assess and monitor skin integrity  - Assess and monitor nutrition and hydration status  - Monitor labs   - Assess for incontinence   - Turn and reposition patient  - Assist with mobility/ambulation  - Relieve pressure over bony prominences  - Avoid friction and shearing  - Provide appropriate hygiene as needed including keeping skin clean and dry  - Evaluate need for skin moisturizer/barrier cream  - Collaborate with interdisciplinary team   - Patient/family teaching  - Consider wound care consult   Outcome: Progressing     Problem: SAFETY ADULT  Goal: Patient will remain free of falls  Description: INTERVENTIONS:  - Assess patient frequently for physical needs  -  Identify cognitive and physical deficits and behaviors that affect risk of falls    -  [place] fall precautions as indicated by assessment   - Educate patient/family on patient safety including physical limitations  - Instruct patient to call for assistance with activity based on assessment  - Modify environment to reduce risk of injury  - Consider OT/PT consult to assist with strengthening/mobility  Outcome: Progressing

## 2020-08-13 NOTE — UTILIZATION REVIEW
Initial Clinical Review    Admission: Date/Time/Statement:   8/9/2020 0852 Observation AND  CHANGED 8/10/2020 1125 TO INPATIENT RE: PATIENT REQUIRING IV ANALGESIA FOR PAIN WITH COMMINUTED DISTAL HUMERUS FRACTURE WHICH NOW REQUIRES ORIF  Start    Ordered    08/10/20 1125    Inpatient Admission  Once      Transfer Service: Hospitalist       Question  Answer  Comment    Admitting Physician  Jeffery Guadarrama     Level of Care  Med Surg     Estimated length of stay  More than 2 Midnights     Certification  I certify that inpatient services are medically necessary for this patient for a duration of greater than two midnights  See H&P and MD Progress Notes for additional information about the patient's course of treatment  08/10/20 1125       ED Arrival Information     Expected Arrival Acuity Means of Arrival Escorted By Service Admission Type    - 8/9/2020 03:36 Urgent Ambulance [address] Urgent    Arrival Complaint    Fall-arm injury        Chief Complaint   Patient presents with    Fall     Pt arrives to ED via EMS with complaint of a fall after she lost he balance causing a deformity to her left upper arm  Pt denies head strike, thinners, or LoC  Assessment/Plan: this is a 72year old female from home to ED via ems admitted to observation [address]  due to closed fracture of distal end of humerus  Presented with severe pain in left upper arm after a fall where also struck head  On exam has left scalp hematoma  Tenderness deformity  Left arm  Wbc 13 58  CT cervical spine with degenerative changes  CT head with soft tissue swelling  CT LUE showed comminuted distal humeral fracture  In the ED, reduced fracture and placed in splint  Pain control in progress  Orthopedics consulted       ON 8/10/2020 CHANGED TO INPATIENT: Patient with ongoing pain despite scheduled tylenol, has used 3 doses of IV analgesia since arrival to floor and medication changed form Morphine to Dilaudid today  Will need ORIF  Per orthopedics 8/10/2020- has left distal humerus fracture and plan surgery 8/11/2020 ORIF left distal humerus  ED Triage Vitals [08/09/20 0338]   Temperature Pulse Respirations Blood Pressure SpO2   98 6 °F (37 °C) 73 16 (!) 179/84 92 %      Temp Source Heart Rate Source Patient Position - Orthostatic VS BP Location FiO2 (%)   Oral Monitor Lying Right arm --      Pain Score       4          Wt Readings from Last 1 Encounters:   08/11/20 61 7 kg (136 lb)     Additional Vital Signs:   08/10/20 0700   98 6 °F (37 °C)   84   18   116/56   88   94 %   None (Room air)   Lying    08/09/20 2300            112/57            Lying    08/09/20 2100   98 6 °F (37 °C)   75      108/62       94 %   None (Room air)   Lying    BP: automatic 97/54 at 08/09/20 2100    08/09/20 1559   99 4 °F (37 4 °C)   76   16   115/55   79   93 %   None (Room air)   Lying    08/09/20 1236   98 3 °F (36 8 °C)   80   18   112/58      92 %   None (Room air         Pertinent Labs/Diagnostic Test Results:   8/9/2020 Xray left humerus Comminuted, displaced and mildly angulated fracture distal humerus  8/9/2020 CT head Cerebral atrophy with chronic small vessel ischemic white matter disease  No acute intracranial abnormality  2   Left supraorbital and left frontal soft tissue swelling  Underlying bony calvarium intact  8/9/2020 CT cervical spine Mild degenerative changes    No acute cervical spine fracture or traumatic malalignment  8/9/2020 Xray left humerus  Reduction and casting    8/9/2020 CT left upper extremity Comminuted distal humeral fracture as described  One of the fracture fragments is in the vicinity of the radial nerve (not distinctly visualize)  2  Left renal stones  3    Enlarged pulmonary arteries, can be seen with pulmonary artery hypertension  Results from last 7 days   Lab Units 08/10/20  0906   SARS-COV-2  Negative     Results from last 7 days   Lab Units 08/13/20  0729 08/09/20  0847   WBC Thousand/uL 9 26 13 58*   HEMOGLOBIN g/dL 10 1* 13 4   HEMATOCRIT % 32 5* 41 6   PLATELETS Thousands/uL 183 215   NEUTROS ABS Thousands/µL 5 17  --          Results from last 7 days   Lab Units 08/13/20  0731 08/10/20  0647 08/09/20  0847   SODIUM mmol/L 141 138 140   POTASSIUM mmol/L 3 9 3 7 4 2   CHLORIDE mmol/L 105 105 105   CO2 mmol/L 31 27 30   ANION GAP mmol/L 5 6 5   BUN mg/dL 15 21 18   CREATININE mg/dL 0 36* 0 39* 0 35*   EGFR ml/min/1 73sq m 114 111 115   CALCIUM mg/dL 7 5* 8 4 8 6   MAGNESIUM mg/dL  --  2 2  --      Results from last 7 days   Lab Units 08/10/20  0647   AST U/L 19   ALT U/L 22   ALK PHOS U/L 50   TOTAL PROTEIN g/dL 6 4   ALBUMIN g/dL 3 2*   TOTAL BILIRUBIN mg/dL 0 60         Results from last 7 days   Lab Units 08/13/20  0731 08/10/20  0647 08/09/20  0847   GLUCOSE RANDOM mg/dL 115 119 126     Results from last 7 days   Lab Units 08/09/20  0847   TOTAL COUNTED  100       ED Treatment:   Medication Administration from 08/09/2020 0336 to 08/09/2020 1235       Date/Time Order Dose Route Action Comments     08/09/2020 0347 fentanyl citrate (PF) (FOR EMS ONLY) 100 mcg/2 mL injection 100 mcg 0 mcg Does not apply Given to EMS      08/09/2020 0502 fentanyl citrate (PF) 100 MCG/2ML 100 mcg 100 mcg Intravenous Given 50 MCG GIVEN AT THIS TIME; WILL GIVE ADDITIONAL 50 MCG IF NEEDED     08/09/2020 0541 bupivacaine (PF) (MARCAINE) 0 25 % injection 30 mL 30 mL Infiltration Given by Other Given by Dr Ricardo Eden     08/09/2020 0929 enoxaparin (LOVENOX) subcutaneous injection 40 mg 40 mg Subcutaneous Given      08/09/2020 1023 ALPRAZolam (XANAX) tablet 0 25 mg 0 25 mg Oral Given      08/09/2020 1024 cholecalciferol (VITAMIN D3) tablet 1,000 Units 1,000 Units Oral Given      08/09/2020 1023 docusate sodium (COLACE) capsule 100 mg 100 mg Oral Given      08/09/2020 1023 PARoxetine (PAXIL) tablet 20 mg 20 mg Oral Given      08/09/2020 1137 oxybutynin (DITROPAN) tablet 5 mg 5 mg Oral Given 08/09/2020 1023 acetaminophen (TYLENOL) tablet 975 mg 975 mg Oral Given      08/09/2020 0915 morphine (PF) 4 mg/mL injection 4 mg 4 mg Intravenous Given         Past Medical History:   Diagnosis Date    Anxiety     Scoliosis      Present on Admission:   Mixed anxiety and depressive disorder      Admitting Diagnosis: Arm injury [S49 90XA]  Humerus fracture [S42 309A]  Age/Sex: 72 y o  female  Admission Orders: 8/9/2020 0852 Observation AND CHANGED TO INPATIENT 8/10/2020 1125  Scheduled Medications:  acetaminophen, 975 mg, Oral, Q8H  cholecalciferol, 1,000 Units, Oral, Daily  docusate sodium, 100 mg, Oral, BID  enoxaparin, 40 mg, Subcutaneous, Daily  oxybutynin, 5 mg, Oral, Daily  PARoxetine, 20 mg, Oral, Daily  polyethylene glycol, 17 g, Oral, Daily  senna, 1 tablet, Oral, HS      Continuous IV Infusions:none     PRN Meds:  ALPRAZolam, 0 25 mg, Oral, TID PRN - used x 1   methocarbamol, 500 mg, Oral, Q6H PRN - used x 2  morphine injection, 2 mg, Intravenous, Q4H PRN - used x 3 (1519, 2335, 0420)  ondansetron, 4 mg, Intravenous, Q6H PRN  HYDROmorphone (DILAUDID) injection 0 5 mg    Dose: 0 5 mg  Freq: Every 4 hours PRN Route: IV  PRN Reason: breakthrough pain  Start: 08/10/20 0915       IP CONSULT TO ORTHOPEDIC SURGERY    Network Utilization Review Department  [email or web address]  ATTENTION: Please call with any questions or concerns to [phone] and carefully listen to the prompts so that you are directed to the right person  All voicemails are confidential   [facility] all requests for admission clinical reviews, approved or denied determinations and any other requests to dedicated fax number below belonging to the campus where the patient is receiving treatment   List of dedicated fax numbers for the Facilities:  [address] DENIALS (Administrative/Medical Necessity) [phone]   [address] (Maternity/NICU/Pediatrics) [phone]   Jorge Lutz [place] [phone]   [facility] [phone]   Deyanira Sena [phone]   [address] [phone]   [facility]  [phone]   [address] [facility] [address]  [address] [facility] [address] [facility] [phone]

## 2020-08-13 NOTE — DISCHARGE SUMMARY
Discharge- Leida Shone 1955, 72 y o  female MRN: [id number]    Unit/Bed#: S -01 Encounter: [id number]    Primary Care Provider: Korin Archer MD   Date and time admitted to hospital: 8/9/2020  3:36 AM  * Closed fracture of distal end of humerus  Assessment & Plan  · X ray finding of left comminuted distal humeral fracture, s/p fall; no need for aggressive tika-operative IVF which patient reports led to iatrogenic volume overload in past   · Pain control regimen  · DVT prophylaxis during hospitalization, not required at discharge per discussion with ortho  · Orthopedic evaluation appreciated; they will follow-up in 2 weeks  · POD #2 s/p ORIF  · OT evaluation appreciated, inpatient rehab recommended but patient was denied for acute rehab and does not wish to go to skilled nursing facility rehab  · Noted other minor injuries from her fall  · Add incentive spirometry for mild rise in temperature and mild drop in oxygen levels noted  · Mild drop in hemoglobin noted since surgery, no additional workup or treatment required    Constipation  Assessment & Plan  · Continue bowel regimen:  Increase senna, add Dulcolax x1    Mixed anxiety and depressive disorder  Assessment & Plan  Continue paxil and p r n  Xanax    Abnormal CT scan  Assessment & Plan  · As seen on CT EXTREMITY "Enlarged pulmonary arteries, can be seen with pulmonary artery hypertension"  · Reviewed this finding with patient  She has absolutely no pulmonary symptoms or history noted    Encouraged her to follow this up as an outpatient    Discharging Physician / Practitioner: Arley Knowles PA-C  PCP: Korin Archer MD  Admission Date:   Admission Orders (From admission, onward)     Ordered        08/10/20 1125  Inpatient Admission  Once         08/09/20 0852  Place in Observation  Once                   Discharge Date: 08/13/20    Resolved Problems  Date Reviewed: 8/13/2020    None          Consultations During [facility] Stay:  · Orthopedics    Procedures Performed:   · Left humeral shaft ORIF    Significant Findings / Test Results:   · As above    Incidental Findings:   · Pulmonary artery enlargement noted on the CT scan of her upper extremity    Test Results Pending at Discharge (will require follow up): · None     Outpatient Tests Requested:  · Consider echocardiogram    Complications:  None    Reason for Admission:  Fall with left arm fracture    Hospital Course:     Latrice Mora is a 72 y o  female patient who originally presented to the hospital on 8/9/2020 due to fall with left elbow/arm pain with associated comminuted distal humeral fracture seen on imaging  She was seen in consultation by Orthopedics and underwent ORIF of the left humeral shaft  She received pain control measures, bowel regimen and DVT prophylaxis  She was seen by occupational therapy  She did not wish to go to skilled nursing facility rehab and was not felt to be a candidate for acute rehab and therefore was instead discharged home with home therapy services and outpatient orthopedic follow-up  Please see above list of diagnoses and related plan for additional information  Condition at Discharge: stable     Discharge Day Visit / Exam:     Subjective:  Patient states she definitely does not want to go to nursing home rehab and will just go home with therapy at home instead  Pain has been controlled and she is not reporting any shortness of breath or other new events or concerns  She has not had a bowel movement but is passing gas and is not reporting any abdominal discomfort  Vitals: Blood Pressure: 112/54 (08/13/20 0854)  Pulse: 74 (08/13/20 0854)  Temperature: 98 1 °F (36 7 °C) (08/13/20 0854)  Temp Source: Oral (08/13/20 0854)  Respirations: 20 (08/13/20 0854)  Height: 5' (152 4 cm) (08/11/20 1234)  Weight - Scale: 61 7 kg (136 lb) (08/11/20 1234)  SpO2: 93 % (08/13/20 0854)  Exam:   Physical Exam  Vitals signs reviewed  Constitutional:       General: She is not in acute distress  Appearance: Normal appearance  She is normal weight  She is not ill-appearing, toxic-appearing or diaphoretic  HENT:      Head:      Comments: Healing faint ecchymosis of left forehead  Cardiovascular:      Rate and Rhythm: Normal rate and regular rhythm  Heart sounds: No murmur  Pulmonary:      Effort: Pulmonary effort is normal  No respiratory distress  Breath sounds: Normal breath sounds  No stridor  No wheezing  Abdominal:      General: Bowel sounds are normal  There is no distension  Palpations: Abdomen is soft  Tenderness: There is no abdominal tenderness  There is no guarding  Musculoskeletal:      Right lower leg: No edema  Left lower leg: No edema  Comments: Left upper extremity in sling   Skin:     General: Skin is warm and dry  Coloration: Skin is not jaundiced or pale  Findings: No bruising or erythema  Neurological:      General: No focal deficit present  Mental Status: She is alert  Psychiatric:         Mood and Affect: Mood normal          Discussion with Family:     Discharge instructions/Information to patient and family:   See after visit summary for information provided to patient and family  Provisions for Follow-Up Care:  See after visit summary for information related to follow-up care and any pertinent home health orders  Disposition:  Home with [facility]    Planned Readmission:      Discharge Statement:  I spent 35 minutes discharging the patient  This time was spent on the day of discharge  I had direct contact with the patient on the day of discharge  Greater than 50% of the total time was spent examining patient, answering all patient questions, arranging and discussing plan of care with patient as well as directly providing post-discharge instructions  Additional time then spent on discharge activities    Bedside nursing rounds performed and case was discussed with case management and Orthopedics    Discharge Medications:  See after visit summary for reconciled discharge medications provided to patient and family        ** Please Note: This note has been constructed using a voice recognition system **

## 2020-08-13 NOTE — PROGRESS NOTES
Orthopedics         Subjective:  Patient seen and evaluated  States left arm pain is well controlled  No numbness or tingling  No fevers or chills      Labs:  0   Lab Value Date/Time    HCT 41 6 08/09/2020 0847    HCT 37 7 08/19/2018 0634    HCT 39 4 08/18/2018 0543    HGB 13 4 08/09/2020 0847    HGB 11 9 08/19/2018 0634    HGB 12 1 08/18/2018 0543    INR 0 99 08/16/2018 1434    WBC 13 58 (H) 08/09/2020 0847    WBC 6 94 08/19/2018 0634    WBC 8 79 08/18/2018 0543       Meds:    Current Facility-Administered Medications:     acetaminophen (TYLENOL) tablet 975 mg, 975 mg, Oral, Q8H, Yumiko Pod, PA-C, 975 mg at 08/13/20 0443    ALPRAZolam Evetta Dina) tablet 0 25 mg, 0 25 mg, Oral, TID PRN, Yumiko Pod, PA-C, 0 25 mg at 08/12/20 1052    cholecalciferol (VITAMIN D3) tablet 1,000 Units, 1,000 Units, Oral, Daily, Yumiko Pod, PA-C, 1,000 Units at 08/12/20 0811    docusate sodium (COLACE) capsule 100 mg, 100 mg, Oral, BID, Yumiko Pod, PA-C, 100 mg at 08/12/20 1731    enoxaparin (LOVENOX) subcutaneous injection 40 mg, 40 mg, Subcutaneous, Daily, Yumiko Pod, PA-C, 40 mg at 08/12/20 0811    HYDROmorphone (DILAUDID) injection 0 5 mg, 0 5 mg, Intravenous, Q4H PRN, Yumiko Pod, PA-C, 0 5 mg at 08/12/20 1855    melatonin tablet 3 mg, 3 mg, Oral, HS, Yumiko Pod, PA-C, 3 mg at 08/12/20 2135    methocarbamol (ROBAXIN) tablet 500 mg, 500 mg, Oral, Q6H PRN, Yumiko Pod, PA-C, 500 mg at 08/13/20 0242    ondansetron (ZOFRAN) injection 4 mg, 4 mg, Intravenous, Q6H PRN, Yumiko Pod, PA-C    oxybutynin [facility]) tablet 5 mg, 5 mg, Oral, Daily, Yumiko Pod, PA-C, 5 mg at 08/12/20 0810    oxyCODONE (ROXICODONE) IR tablet 2 5 mg, 2 5 mg, Oral, Q4H PRN, Yumiko Pod, PA-C, 2 5 mg at 08/12/20 2038    oxyCODONE (ROXICODONE) IR tablet 5 mg, 5 mg, Oral, Q4H PRN, Yumiko Pod, PA-C, 5 mg at 08/13/20 0242    PARoxetine (PAXIL) tablet 20 mg, 20 mg, Oral, Daily, Yumiko Pod, PA-C, 20 mg at 08/12/20 0810    polyethylene glycol (MIRALAX) packet 17 g, 17 g, Oral, Daily, Shaggy Simple, PA-C, 17 g at 08/12/20 2221    senna (SENOKOT) tablet 8 6 mg, 1 tablet, Oral, BID, Shaggy Simple, PA-C, 8 6 mg at 08/12/20 1731    Physical exam:  Vitals:    08/12/20 2229   BP: 100/53   Pulse: 102   Resp: 18   Temp: 100 °F (37 8 °C)   SpO2: 90%     Left upper extremity:  · Dressing C/D/I  · Wrist and digital range of motion intact  Wrist extension intact  · Sensation intact median ulnar and radial nerves  · 2+ radial pulse    Assessment: 72 y  o female status post ORIF distal humerus fracture 8/11/20    Plan:  · Pain control  · NWB RUE in sling  · PT/OT  · DVT prophylaxis, Lovenox  · Stable for discharge from orthopedic standpoint    Follow-up with Dr Fermin Giles 10-14 days from surgical date    [place], [place]

## 2020-08-14 ENCOUNTER — TRANSITIONAL CARE MANAGEMENT (OUTPATIENT)
Dept: FAMILY MEDICINE CLINIC | Facility: CLINIC | Age: 65
End: 2020-08-14

## 2020-08-14 ENCOUNTER — TELEPHONE (OUTPATIENT)
Dept: FAMILY MEDICINE CLINIC | Facility: CLINIC | Age: 65
End: 2020-08-14

## 2020-08-14 NOTE — UTILIZATION REVIEW
Notification of Discharge  This is a Notification of Discharge from our facility [address]  Please be advised that this patient has been discharge from our facility  Below you will find the admission and discharge date and time including the patients disposition  PRESENTATION DATE: 8/9/2020  3:36 AM  OBS ADMISSION DATE:   IP ADMISSION DATE: 8/10/20 1125   DISCHARGE DATE: 8/13/2020  2:27 PM  DISPOSITION: [facility] [address] [facility]   Admission Orders listed below:  Admission Orders (From admission, onward)     Ordered        08/10/20 1125  Inpatient Admission  Once         08/09/20 0302  Place in Observation  Once                   Please contact the UR Department if additional information is required to close this patient's authorization/case  [address] [facility] Utilization Review Department  Main: [phone] x carefully listen to the prompts  All voicemails are confidential   [email or web address]  Send all requests for admission clinical reviews, approved or denied determinations and any other requests to dedicated fax number below belonging to the campus where the patient is receiving treatment   List of dedicated fax numbers:  [address] DENIALS (Administrative/Medical Necessity) [phone]   [address] (Maternity/NICU/Pediatrics) [phone]   Celi Flatten [phone]   Humberto Givens [phone]   Autumn Diamond [phone]   [address] [phone]   [facility]  [phone]   [address] [facility] [address]  [address] [facility] [address] [facility] [phone]

## 2020-08-17 ENCOUNTER — TELEPHONE (OUTPATIENT)
Dept: OBGYN CLINIC | Facility: HOSPITAL | Age: 65
End: 2020-08-17

## 2020-08-17 ENCOUNTER — TRANSITIONAL CARE MANAGEMENT (OUTPATIENT)
Dept: FAMILY MEDICINE CLINIC | Facility: CLINIC | Age: 65
End: 2020-08-17

## 2020-08-17 NOTE — TELEPHONE ENCOUNTER
Patient sees Dr Juliano Wiggins  Patient is calling for medication refill:  Oxycodone 5 MG, no remaining tablets  Methocarbamol 500 MG, 5 remaining tablets  Please send prescription to [address]  Patient is asking, if she can remove the dressing off her hand on Friday, 8/21? She has papers stating that it can be remove after a week of surgery  Patient is requesting a call back      Call back #[phone]

## 2020-08-18 DIAGNOSIS — S32.591A CLOSED BILATERAL FRACTURE OF PUBIC RAMI, INITIAL ENCOUNTER (HCC): ICD-10-CM

## 2020-08-18 DIAGNOSIS — S42.492A OTHER CLOSED DISPLACED FRACTURE OF DISTAL END OF LEFT HUMERUS, INITIAL ENCOUNTER: ICD-10-CM

## 2020-08-18 DIAGNOSIS — S32.592A CLOSED BILATERAL FRACTURE OF PUBIC RAMI, INITIAL ENCOUNTER (HCC): ICD-10-CM

## 2020-08-18 RX ORDER — METHOCARBAMOL 500 MG/1
500 TABLET, FILM COATED ORAL EVERY 6 HOURS PRN
Qty: 30 TABLET | Refills: 1 | Status: SHIPPED | OUTPATIENT
Start: 2020-08-18 | End: 2021-01-14 | Stop reason: ALTCHOICE

## 2020-08-18 RX ORDER — OXYCODONE HYDROCHLORIDE 5 MG/1
5 TABLET ORAL EVERY 6 HOURS PRN
Qty: 20 TABLET | Refills: 0 | Status: SHIPPED | OUTPATIENT
Start: 2020-08-18 | End: 2020-08-25

## 2020-08-18 NOTE — TELEPHONE ENCOUNTER
Nan De La Cruz can you please call pt, let her know her dressing stays on until follow up  I renewed the methocarbamol and roxicodone rx,   Please let her know she should be transitioning to OTC's

## 2020-08-26 ENCOUNTER — APPOINTMENT (OUTPATIENT)
Dept: RADIOLOGY | Facility: AMBULARY SURGERY CENTER | Age: 65
End: 2020-08-26
Attending: SURGERY
Payer: COMMERCIAL

## 2020-08-26 ENCOUNTER — OFFICE VISIT (OUTPATIENT)
Dept: OBGYN CLINIC | Facility: CLINIC | Age: 65
End: 2020-08-26

## 2020-08-26 VITALS
HEIGHT: 60 IN | BODY MASS INDEX: 26.56 KG/M2 | SYSTOLIC BLOOD PRESSURE: 116 MMHG | HEART RATE: 76 BPM | DIASTOLIC BLOOD PRESSURE: 67 MMHG

## 2020-08-26 DIAGNOSIS — Z98.890 S/P ORIF (OPEN REDUCTION INTERNAL FIXATION) FRACTURE: Primary | ICD-10-CM

## 2020-08-26 DIAGNOSIS — Z98.890 S/P ORIF (OPEN REDUCTION INTERNAL FIXATION) FRACTURE: ICD-10-CM

## 2020-08-26 DIAGNOSIS — Z87.81 S/P ORIF (OPEN REDUCTION INTERNAL FIXATION) FRACTURE: Primary | ICD-10-CM

## 2020-08-26 DIAGNOSIS — Z87.81 S/P ORIF (OPEN REDUCTION INTERNAL FIXATION) FRACTURE: ICD-10-CM

## 2020-08-26 PROCEDURE — 73060 X-RAY EXAM OF HUMERUS: CPT

## 2020-08-26 PROCEDURE — 99024 POSTOP FOLLOW-UP VISIT: CPT | Performed by: SURGERY

## 2020-08-26 PROCEDURE — 4040F PNEUMOC VAC/ADMIN/RCVD: CPT | Performed by: SURGERY

## 2020-08-26 PROCEDURE — 1111F DSCHRG MED/CURRENT MED MERGE: CPT | Performed by: SURGERY

## 2020-08-26 NOTE — PROGRESS NOTES
Assessment/Plan:  Patient ID: Ilda Boswell 72 y o  female   Surgery: ORIF left humeral shaft, ulnar nerve release, tenotomy anconeus epitrochlearis, radial nerve dissection - Left  Date of Surgery: 8/11/2020    15 days s/p left humeral shaft ORIF, ulnar nerve release, radial nerve dissection and tenotomy anconeus epitrochlearis  Staples were removed today, she was advised to perform scar massage  OT was ordered for elbow ROM exercises  She will be NWB to her LUE and should limit her weight lifting to 10 lbs  Follow up in 4 weeks time with repeat humerus x-rays and a ROM check  Follow Up:  4  week(s)    To Do Next Visit:  X-rays of the  left  humerus      CHIEF COMPLAINT:  Chief Complaint   Patient presents with    Left Arm - Post-op         SUBJECTIVE:  Ilda Boswell is a 72y o  year old female who presents for follow up after ORIF left humeral shaft, ulnar nerve release, tenotomy anconeus epitrochlearis, radial nerve dissection - Left  Today patient has None and No Complaints  PHYSICAL EXAMINATION:  General: well developed and well nourished, alert, oriented times 3 and appears comfortable  Psychiatric: Normal    MUSCULOSKELETAL EXAMINATION:  Incision: clean, dry and staples intact   Surgery Site: normal, no evidence of infection   Range of Motion: full composite fist possible and full wrist ROM, lacking the last 15 degrees of full elbow extension, felbow flexion aprox   110 degress, full supination and pronation  Neurovascular status: Neuro intact, good cap refill  Activity Restrictions: 10 lb lifting restriction, NWB LUE  Done today: staples removed       STUDIES REVIEWED:   Images were reviewed in PACS by Dr Alanna Duane and demonstrate:   Stable alignment of distal humeral shaft fracture no hardware complication      PROCEDURES PERFORMED:  Procedures  No Procedures performed today    Scribe Attestation    I,:   Julio Sanchez am acting as a scribe while in the presence of the attending physician :        I,:   Svetlana Hernandez MD personally performed the services described in this documentation    as scribed in my presence :

## 2020-08-27 ENCOUNTER — TELEPHONE (OUTPATIENT)
Dept: OBGYN CLINIC | Facility: HOSPITAL | Age: 65
End: 2020-08-27

## 2020-08-27 NOTE — TELEPHONE ENCOUNTER
Please assist Dr Pierre Melton with the clinical side of this message, It was stated that [facility] does not accept their insurance

## 2020-08-27 NOTE — TELEPHONE ENCOUNTER
Spoke to pt   Barb Crawford who stated pt  Is being seen by PT and OT 2 times a week by both through [facility]

## 2020-08-27 NOTE — TELEPHONE ENCOUNTER
Called and left a detailed message for pt  To ask [facility] if they would require a new referral from ortho and they should get a fax number if needed in order to send the referral  I will await a call back

## 2020-08-27 NOTE — TELEPHONE ENCOUNTER
stMaria R cheng text any questions  States [facility] referral should go to [facility]

## 2020-08-27 NOTE — TELEPHONE ENCOUNTER
Patient was ordered VNA services by the hospital prior to discharge as well as by our office yesterday  LM on  for patient to call back  Will ask if she is receiving VNA services currently when call returned

## 2020-08-27 NOTE — TELEPHONE ENCOUNTER
Okay, need their contact info, So we can fax referral  Again, I am not sure why she needs home health - we discussed that there is no real wound care involved, can you please clarify what the actual need for home health is so we can make the order clear       Thank you

## 2020-09-10 ENCOUNTER — TELEPHONE (OUTPATIENT)
Dept: OBGYN CLINIC | Facility: HOSPITAL | Age: 65
End: 2020-09-10

## 2020-09-10 NOTE — TELEPHONE ENCOUNTER
Ra,   Please call  Let them know non weight baring for transfers for 6 weeks from surgery date  Can use hand for activities of daily living, can lift up to 5 lbs

## 2020-09-10 NOTE — TELEPHONE ENCOUNTER
Patient sees Dr Naik Charles  Douglas Ramirez from [facility] called, asking for clarification in regards the exercises and how long she has to be non weight bearing?   Gaby Lloyd #[phone]

## 2020-09-13 PROBLEM — S42.302A FRACTURE OF LEFT HUMERUS: Status: ACTIVE | Noted: 2020-09-13

## 2020-09-14 ENCOUNTER — OFFICE VISIT (OUTPATIENT)
Dept: FAMILY MEDICINE CLINIC | Facility: CLINIC | Age: 65
End: 2020-09-14
Payer: COMMERCIAL

## 2020-09-14 VITALS
WEIGHT: 130 LBS | HEIGHT: 60 IN | OXYGEN SATURATION: 99 % | HEART RATE: 80 BPM | RESPIRATION RATE: 16 BRPM | BODY MASS INDEX: 25.52 KG/M2 | TEMPERATURE: 98.8 F | DIASTOLIC BLOOD PRESSURE: 60 MMHG | SYSTOLIC BLOOD PRESSURE: 120 MMHG

## 2020-09-14 DIAGNOSIS — Z23 ENCOUNTER FOR IMMUNIZATION: ICD-10-CM

## 2020-09-14 DIAGNOSIS — R73.01 IMPAIRED FASTING GLUCOSE: ICD-10-CM

## 2020-09-14 DIAGNOSIS — E55.9 VITAMIN D DEFICIENCY: ICD-10-CM

## 2020-09-14 DIAGNOSIS — D64.9 ANEMIA, UNSPECIFIED TYPE: ICD-10-CM

## 2020-09-14 DIAGNOSIS — S42.492D OTHER CLOSED DISPLACED FRACTURE OF DISTAL END OF LEFT HUMERUS WITH ROUTINE HEALING, SUBSEQUENT ENCOUNTER: ICD-10-CM

## 2020-09-14 DIAGNOSIS — F41.8 MIXED ANXIETY AND DEPRESSIVE DISORDER: Primary | ICD-10-CM

## 2020-09-14 DIAGNOSIS — M85.851 OSTEOPENIA OF RIGHT HIP: ICD-10-CM

## 2020-09-14 DIAGNOSIS — F41.9 ANXIETY: ICD-10-CM

## 2020-09-14 DIAGNOSIS — N39.41 URGE INCONTINENCE OF URINE: ICD-10-CM

## 2020-09-14 PROCEDURE — 3288F FALL RISK ASSESSMENT DOCD: CPT

## 2020-09-14 PROCEDURE — 90670 PCV13 VACCINE IM: CPT

## 2020-09-14 PROCEDURE — 3725F SCREEN DEPRESSION PERFORMED: CPT

## 2020-09-14 PROCEDURE — 90662 IIV NO PRSV INCREASED AG IM: CPT

## 2020-09-14 PROCEDURE — 4040F PNEUMOC VAC/ADMIN/RCVD: CPT

## 2020-09-14 PROCEDURE — 1100F PTFALLS ASSESS-DOCD GE2>/YR: CPT

## 2020-09-14 PROCEDURE — 1036F TOBACCO NON-USER: CPT

## 2020-09-14 PROCEDURE — G0008 ADMIN INFLUENZA VIRUS VAC: HCPCS

## 2020-09-14 PROCEDURE — G0009 ADMIN PNEUMOCOCCAL VACCINE: HCPCS

## 2020-09-14 PROCEDURE — 99214 OFFICE O/P EST MOD 30 MIN: CPT

## 2020-09-14 RX ORDER — PAROXETINE HYDROCHLORIDE 20 MG/1
20 TABLET, FILM COATED ORAL DAILY
Qty: 90 TABLET | Refills: 3 | Status: SHIPPED | OUTPATIENT
Start: 2020-09-14 | End: 2021-01-14 | Stop reason: ALTCHOICE

## 2020-09-14 NOTE — PROGRESS NOTES
BMI Counseling: Body mass index is 25 39 kg/m²  The BMI is above normal  Nutrition recommendations include decreasing portion sizes, encouraging healthy choices of fruits and vegetables, consuming healthier snacks and moderation in carbohydrate intake  Exercise recommendations include exercising 3-5 times per week  No pharmacotherapy was ordered  Assessment/Plan:         Problem List Items Addressed This Visit        Endocrine    Impaired fasting glucose     Will check A1C  Relevant Orders    Hemoglobin A1C       Musculoskeletal and Integument    Osteopenia of right hip     Last Dexa was March 2019  Cont Vit D  Recheck in March 2021  Closed fracture of distal end of humerus     Pt had ORIF on 8/11/20 and doing well  Getting PT and OT  Sees Ortho for f/u on 9/23/20  Other    Vitamin D deficiency     Cont 1000 U qd and recheck level  Relevant Orders    Vitamin D 25 hydroxy    Urge incontinence of urine     Stable with oxybutynin 5 mg qd  Mixed anxiety and depressive disorder - Primary     Stress level and mood ok  Cont meds  Refill sent for paxil  Relevant Medications    PARoxetine (PAXIL) 20 mg tablet    Anemia     Hgb 10 1 in Aug 2020 after surgery  Will recheck  Relevant Orders    CBC and differential      Other Visit Diagnoses     Anxiety        Relevant Medications    PARoxetine (PAXIL) 20 mg tablet    Encounter for immunization        Relevant Orders    PNEUMOCOCCAL CONJUGATE VACCINE 13-VALENT GREATER THAN 6 MONTHS    influenza vaccine, high-dose, PF 0 7 mL (FLUZONE HIGH-DOSE)            Subjective:      Patient ID: Blake Llamas is a 72 y o  female  Pt here for f/u depression/anxiety, Vit D def, OAB, Osteopenia, Amemia, L humerus Fx  Pt fell on 8/8/20 and was admitted for L humerus surgery on 8/11/20  Was discharged on 8/13 and getting PT and OT at home  Pain not bad at present  Takes tylenol and musc relaxer at night   Constipation better since off oxycodone  Stress level up and down  Takes paxil daily and xanax prn  The following portions of the patient's history were reviewed and updated as appropriate:   She has a past medical history of Anxiety and Scoliosis  ,  does not have any pertinent problems on file  ,   has a past surgical history that includes Joint replacement; Back surgery (1974); Colonoscopy (01/01/2013); Kidney stone surgery; and ELBOW FRACTURE REPAIR (Left, 8/11/2020)  ,  family history includes Colon cancer in her maternal uncle; Hypertension in her father; Skin cancer in her mother  ,   reports that she has never smoked  She has never used smokeless tobacco  She reports current alcohol use  She reports that she does not use drugs  ,  is allergic to macrobid [nitrofurantoin]     Current Outpatient Medications   Medication Sig Dispense Refill    ALPRAZolam (XANAX) 0 25 mg tablet Take 1 tablet (0 25 mg total) by mouth 3 (three) times a day as needed for anxiety 60 tablet 0    cholecalciferol (VITAMIN D3) 1,000 units tablet Take 1,000 Units by mouth daily      methocarbamol (ROBAXIN) 500 mg tablet Take 1 tablet (500 mg total) by mouth every 6 (six) hours as needed for muscle spasms 30 tablet 1    oxybutynin (DITROPAN) 5 mg tablet Take 1 tablet (5 mg total) by mouth daily  0    PARoxetine (PAXIL) 20 mg tablet Take 1 tablet (20 mg total) by mouth daily 90 tablet 3     No current facility-administered medications for this visit  Review of Systems   Constitutional: Negative for activity change, appetite change, fatigue and unexpected weight change  Respiratory: Negative for chest tightness and shortness of breath  Cardiovascular: Negative for chest pain and palpitations  Gastrointestinal: Negative for abdominal pain, diarrhea, nausea and vomiting  Neurological: Negative for headaches  Psychiatric/Behavioral: Negative for decreased concentration, dysphoric mood, sleep disturbance and suicidal ideas   The patient is not nervous/anxious  Objective:  Vitals:    09/14/20 1420   BP: 120/60   BP Location: Right arm   Patient Position: Sitting   Cuff Size: Adult   Pulse: 80   Resp: 16   Temp: 98 8 °F (37 1 °C)   SpO2: 99%   Weight: 59 kg (130 lb)   Height: 5' (1 524 m)     Body mass index is 25 39 kg/m²  Physical Exam  Vitals signs and nursing note reviewed  Constitutional:       Appearance: Normal appearance  She is well-developed  Comments: Pt in wheelchair   HENT:      Head: Normocephalic and atraumatic  Neck:      Musculoskeletal: Normal range of motion and neck supple  Cardiovascular:      Rate and Rhythm: Normal rate and regular rhythm  Heart sounds: Normal heart sounds  No murmur  Pulmonary:      Effort: Pulmonary effort is normal  No respiratory distress  Breath sounds: Normal breath sounds  No wheezing  Musculoskeletal:      Comments: Dec ROM L elbow, multiple staple marks from L elbow to L upper arm   Lymphadenopathy:      Cervical: No cervical adenopathy  Neurological:      Mental Status: She is alert and oriented to person, place, and time  Psychiatric:         Behavior: Behavior normal          Thought Content:  Thought content normal          Judgment: Judgment normal

## 2020-09-23 ENCOUNTER — APPOINTMENT (OUTPATIENT)
Dept: RADIOLOGY | Facility: AMBULARY SURGERY CENTER | Age: 65
End: 2020-09-23
Attending: SURGERY
Payer: COMMERCIAL

## 2020-09-23 ENCOUNTER — OFFICE VISIT (OUTPATIENT)
Dept: OBGYN CLINIC | Facility: CLINIC | Age: 65
End: 2020-09-23

## 2020-09-23 VITALS
SYSTOLIC BLOOD PRESSURE: 128 MMHG | TEMPERATURE: 98.3 F | BODY MASS INDEX: 25.39 KG/M2 | DIASTOLIC BLOOD PRESSURE: 70 MMHG | HEIGHT: 60 IN | HEART RATE: 76 BPM

## 2020-09-23 DIAGNOSIS — Z98.890 S/P ORIF (OPEN REDUCTION INTERNAL FIXATION) FRACTURE: ICD-10-CM

## 2020-09-23 DIAGNOSIS — Z98.890 S/P ORIF (OPEN REDUCTION INTERNAL FIXATION) FRACTURE: Primary | ICD-10-CM

## 2020-09-23 DIAGNOSIS — Z87.81 S/P ORIF (OPEN REDUCTION INTERNAL FIXATION) FRACTURE: ICD-10-CM

## 2020-09-23 DIAGNOSIS — Z87.81 S/P ORIF (OPEN REDUCTION INTERNAL FIXATION) FRACTURE: Primary | ICD-10-CM

## 2020-09-23 PROCEDURE — 99024 POSTOP FOLLOW-UP VISIT: CPT | Performed by: SURGERY

## 2020-09-23 PROCEDURE — 73060 X-RAY EXAM OF HUMERUS: CPT

## 2020-09-23 NOTE — PROGRESS NOTES
Assessment/Plan:  Patient ID: Lele Schuster 72 y o  female   Surgery: ORIF left humeral shaft, ulnar nerve release, tenotomy anconeus epitrochlearis, radial nerve dissection - Left  Date of Surgery: 8/11/2020    X-rays reviewed with her today  She will continue to work on elbow extension  May use the arm for transfers  No restrictions with weightbearing at this time  Will follow up in 6 weeks for repeat evaluation and repeat x-ray    Follow Up:  6  week(s)    To Do Next Visit:  X-rays of the  left  humerus      CHIEF COMPLAINT:  Chief Complaint   Patient presents with    Left Arm - Follow-up         SUBJECTIVE:  Lele Schuster is a 72y o  year old female who presents for follow up after ORIF left humeral shaft, ulnar nerve release, tenotomy anconeus epitrochlearis, radial nerve dissection - Left  Today patient has Pain  Mild  Infrequent  Dull and Aching  She denies any numbness or tingling         PHYSICAL EXAMINATION:  General: well developed and well nourished, alert, oriented times 3 and appears comfortable  Psychiatric: Normal    MUSCULOSKELETAL EXAMINATION:  Incision: Clean, dry, intact and healed  Surgery Site: normal, no evidence of infection   Range of Motion: Limited due to stiffness  Neurovascular status: Neuro intact, good cap refill  Activity Restrictions: No restrictions         STUDIES REVIEWED:  I personally reviewed x-rays of left humerus which demonstrates near anatomic alignment with healing fracture evidence of fracture line still visible no hardware complication    PROCEDURES PERFORMED:  Procedures  No Procedures performed today    Scribe Attestation    I,:   Ann Marie Sanchez MA am acting as a scribe while in the presence of the attending physician :        I,:   Radhika Persaud MD personally performed the services described in this documentation    as scribed in my presence :

## 2020-10-02 ENCOUNTER — TELEPHONE (OUTPATIENT)
Dept: FAMILY MEDICINE CLINIC | Facility: CLINIC | Age: 65
End: 2020-10-02

## 2020-10-02 DIAGNOSIS — S42.492D OTHER CLOSED DISPLACED FRACTURE OF DISTAL END OF LEFT HUMERUS WITH ROUTINE HEALING, SUBSEQUENT ENCOUNTER: Primary | ICD-10-CM

## 2020-10-15 ENCOUNTER — TELEPHONE (OUTPATIENT)
Dept: FAMILY MEDICINE CLINIC | Facility: CLINIC | Age: 65
End: 2020-10-15

## 2020-10-20 ENCOUNTER — TELEPHONE (OUTPATIENT)
Dept: FAMILY MEDICINE CLINIC | Facility: CLINIC | Age: 65
End: 2020-10-20

## 2020-10-20 DIAGNOSIS — F41.9 ANXIETY: ICD-10-CM

## 2020-10-20 RX ORDER — ALPRAZOLAM 0.25 MG/1
0.25 TABLET ORAL 3 TIMES DAILY PRN
Qty: 60 TABLET | Refills: 0 | Status: SHIPPED | OUTPATIENT
Start: 2020-10-20 | End: 2020-12-14 | Stop reason: SDUPTHER

## 2020-10-23 ENCOUNTER — TELEPHONE (OUTPATIENT)
Dept: FAMILY MEDICINE CLINIC | Facility: CLINIC | Age: 65
End: 2020-10-23

## 2020-10-30 ENCOUNTER — TELEPHONE (OUTPATIENT)
Dept: FAMILY MEDICINE CLINIC | Facility: CLINIC | Age: 65
End: 2020-10-30

## 2020-11-02 ENCOUNTER — TELEPHONE (OUTPATIENT)
Dept: FAMILY MEDICINE CLINIC | Facility: CLINIC | Age: 65
End: 2020-11-02

## 2020-11-02 DIAGNOSIS — S32.591A CLOSED BILATERAL FRACTURE OF PUBIC RAMI, INITIAL ENCOUNTER (HCC): Primary | ICD-10-CM

## 2020-11-02 DIAGNOSIS — M85.851 OSTEOPENIA OF RIGHT HIP: ICD-10-CM

## 2020-11-02 DIAGNOSIS — S32.592A CLOSED BILATERAL FRACTURE OF PUBIC RAMI, INITIAL ENCOUNTER (HCC): Primary | ICD-10-CM

## 2020-11-02 DIAGNOSIS — M54.9 BACK PAIN, UNSPECIFIED BACK LOCATION, UNSPECIFIED BACK PAIN LATERALITY, UNSPECIFIED CHRONICITY: ICD-10-CM

## 2020-11-04 ENCOUNTER — OFFICE VISIT (OUTPATIENT)
Dept: OBGYN CLINIC | Facility: CLINIC | Age: 65
End: 2020-11-04

## 2020-11-04 ENCOUNTER — APPOINTMENT (OUTPATIENT)
Dept: RADIOLOGY | Facility: AMBULARY SURGERY CENTER | Age: 65
End: 2020-11-04
Attending: SURGERY
Payer: COMMERCIAL

## 2020-11-04 DIAGNOSIS — Z98.890 S/P ORIF (OPEN REDUCTION INTERNAL FIXATION) FRACTURE: Primary | ICD-10-CM

## 2020-11-04 DIAGNOSIS — Z87.81 S/P ORIF (OPEN REDUCTION INTERNAL FIXATION) FRACTURE: Primary | ICD-10-CM

## 2020-11-04 DIAGNOSIS — Z87.81 S/P ORIF (OPEN REDUCTION INTERNAL FIXATION) FRACTURE: ICD-10-CM

## 2020-11-04 DIAGNOSIS — Z98.890 S/P ORIF (OPEN REDUCTION INTERNAL FIXATION) FRACTURE: ICD-10-CM

## 2020-11-04 PROCEDURE — 99024 POSTOP FOLLOW-UP VISIT: CPT | Performed by: SURGERY

## 2020-11-04 PROCEDURE — 73060 X-RAY EXAM OF HUMERUS: CPT

## 2020-11-09 ENCOUNTER — TELEPHONE (OUTPATIENT)
Dept: FAMILY MEDICINE CLINIC | Facility: CLINIC | Age: 65
End: 2020-11-09

## 2020-11-09 DIAGNOSIS — N39.41 URGE INCONTINENCE OF URINE: ICD-10-CM

## 2020-11-09 RX ORDER — OXYBUTYNIN CHLORIDE 5 MG/1
5 TABLET ORAL DAILY
Qty: 90 TABLET | Refills: 3 | Status: SHIPPED | OUTPATIENT
Start: 2020-11-09 | End: 2020-11-20 | Stop reason: SDUPTHER

## 2020-11-20 DIAGNOSIS — N39.41 URGE INCONTINENCE OF URINE: Primary | ICD-10-CM

## 2020-11-20 RX ORDER — OXYBUTYNIN CHLORIDE 10 MG/1
10 TABLET, EXTENDED RELEASE ORAL
COMMUNITY
End: 2020-11-20 | Stop reason: SDUPTHER

## 2020-11-20 RX ORDER — OXYBUTYNIN CHLORIDE 10 MG/1
10 TABLET, EXTENDED RELEASE ORAL
Qty: 90 TABLET | Refills: 3 | Status: SHIPPED | OUTPATIENT
Start: 2020-11-20 | End: 2021-03-24 | Stop reason: SDUPTHER

## 2020-12-02 LAB
25(OH)D3 SERPL-MCNC: 34 NG/ML (ref 30–100)
BASOPHILS # BLD AUTO: 41 CELLS/UL (ref 0–200)
BASOPHILS NFR BLD AUTO: 0.4 %
EOSINOPHIL # BLD AUTO: 41 CELLS/UL (ref 15–500)
EOSINOPHIL NFR BLD AUTO: 0.4 %
ERYTHROCYTE [DISTWIDTH] IN BLOOD BY AUTOMATED COUNT: 13.6 % (ref 11–15)
HBA1C MFR BLD: 5.6 % OF TOTAL HGB
HCT VFR BLD AUTO: 43.9 % (ref 35–45)
HGB BLD-MCNC: 14.3 G/DL (ref 11.7–15.5)
LYMPHOCYTES # BLD AUTO: 2060 CELLS/UL (ref 850–3900)
LYMPHOCYTES NFR BLD AUTO: 20.2 %
MCH RBC QN AUTO: 29.7 PG (ref 27–33)
MCHC RBC AUTO-ENTMCNC: 32.6 G/DL (ref 32–36)
MCV RBC AUTO: 91.3 FL (ref 80–100)
MONOCYTES # BLD AUTO: 653 CELLS/UL (ref 200–950)
MONOCYTES NFR BLD AUTO: 6.4 %
NEUTROPHILS # BLD AUTO: 7405 CELLS/UL (ref 1500–7800)
NEUTROPHILS NFR BLD AUTO: 72.6 %
PLATELET # BLD AUTO: 272 THOUSAND/UL (ref 140–400)
PMV BLD REES-ECKER: 11.8 FL (ref 7.5–12.5)
RBC # BLD AUTO: 4.81 MILLION/UL (ref 3.8–5.1)
WBC # BLD AUTO: 10.2 THOUSAND/UL (ref 3.8–10.8)

## 2020-12-14 DIAGNOSIS — S32.591A CLOSED BILATERAL FRACTURE OF PUBIC RAMI, INITIAL ENCOUNTER (HCC): ICD-10-CM

## 2020-12-14 DIAGNOSIS — S32.592A CLOSED BILATERAL FRACTURE OF PUBIC RAMI, INITIAL ENCOUNTER (HCC): ICD-10-CM

## 2020-12-14 DIAGNOSIS — F41.9 ANXIETY: ICD-10-CM

## 2020-12-14 RX ORDER — METHOCARBAMOL 500 MG/1
500 TABLET, FILM COATED ORAL EVERY 6 HOURS PRN
Qty: 30 TABLET | Refills: 0 | Status: CANCELLED | OUTPATIENT
Start: 2020-12-14

## 2020-12-14 RX ORDER — ALPRAZOLAM 0.25 MG/1
0.25 TABLET ORAL 3 TIMES DAILY PRN
Qty: 60 TABLET | Refills: 0 | Status: SHIPPED | OUTPATIENT
Start: 2020-12-14 | End: 2021-02-01 | Stop reason: SDUPTHER

## 2021-01-14 ENCOUNTER — OFFICE VISIT (OUTPATIENT)
Dept: FAMILY MEDICINE CLINIC | Facility: CLINIC | Age: 66
End: 2021-01-14
Payer: COMMERCIAL

## 2021-01-14 VITALS
HEART RATE: 92 BPM | OXYGEN SATURATION: 97 % | HEIGHT: 60 IN | TEMPERATURE: 98.9 F | BODY MASS INDEX: 27.29 KG/M2 | WEIGHT: 139 LBS | DIASTOLIC BLOOD PRESSURE: 70 MMHG | SYSTOLIC BLOOD PRESSURE: 134 MMHG

## 2021-01-14 DIAGNOSIS — Z13.820 ENCOUNTER FOR OSTEOPOROSIS SCREENING IN ASYMPTOMATIC POSTMENOPAUSAL PATIENT: ICD-10-CM

## 2021-01-14 DIAGNOSIS — Z12.31 ENCOUNTER FOR SCREENING MAMMOGRAM FOR BREAST CANCER: ICD-10-CM

## 2021-01-14 DIAGNOSIS — F41.8 MIXED ANXIETY AND DEPRESSIVE DISORDER: ICD-10-CM

## 2021-01-14 DIAGNOSIS — D64.9 ANEMIA, UNSPECIFIED TYPE: ICD-10-CM

## 2021-01-14 DIAGNOSIS — E55.9 VITAMIN D DEFICIENCY: ICD-10-CM

## 2021-01-14 DIAGNOSIS — Z78.0 ENCOUNTER FOR OSTEOPOROSIS SCREENING IN ASYMPTOMATIC POSTMENOPAUSAL PATIENT: ICD-10-CM

## 2021-01-14 DIAGNOSIS — M85.851 OSTEOPENIA OF RIGHT HIP: ICD-10-CM

## 2021-01-14 DIAGNOSIS — N39.41 URGE INCONTINENCE OF URINE: ICD-10-CM

## 2021-01-14 DIAGNOSIS — R73.01 IMPAIRED FASTING GLUCOSE: ICD-10-CM

## 2021-01-14 PROCEDURE — 99214 OFFICE O/P EST MOD 30 MIN: CPT | Performed by: FAMILY MEDICINE

## 2021-01-14 RX ORDER — AMOXICILLIN 250 MG/5ML
POWDER, FOR SUSPENSION ORAL
COMMUNITY
Start: 2020-12-17 | End: 2021-09-16 | Stop reason: ALTCHOICE

## 2021-01-14 NOTE — ASSESSMENT & PLAN NOTE
Pt has had dec mood  Will change to zoloft 50 mg qd to see if it will help better per pt request  Cont xanax prn

## 2021-01-14 NOTE — PROGRESS NOTES
BMI Counseling: Body mass index is 27 15 kg/m²  The BMI is above normal  Nutrition recommendations include decreasing portion sizes, encouraging healthy choices of fruits and vegetables, consuming healthier snacks and moderation in carbohydrate intake  Exercise recommendations include exercising 3-5 times per week  No pharmacotherapy was ordered  Falls Plan of Care: balance, strength, and gait training instructions were provided  Assessment/Plan:         Problem List Items Addressed This Visit        Endocrine    Impaired fasting glucose     A1C 5 6 in Dec 2020  Will follow in 6-12 mths  Discussed low carb diet  Musculoskeletal and Integument    Osteopenia of right hip     Last Dexa was March 2019  Cont Vit D  Recheck in March 2021  Relevant Orders    DXA bone density spine hip and pelvis       Other    Vitamin D deficiency     Level 34 in Dec 2020  Cont 1000 U qd  Urge incontinence of urine    Mixed anxiety and depressive disorder     Pt has had dec mood  Will change to zoloft 50 mg qd to see if it will help better per pt request  Cont xanax prn  Relevant Medications    sertraline (ZOLOFT) 50 mg tablet    Anemia     Hgb 10 1 in Aug 2020 after surgery  Back up to 14 3 in Dec 2020  Other Visit Diagnoses     Encounter for screening mammogram for breast cancer        Relevant Orders    Mammo screening bilateral w cad    Encounter for osteoporosis screening in asymptomatic postmenopausal patient        Relevant Orders    DXA bone density spine hip and pelvis            Subjective:      Patient ID: Ned Randolph is a 72 y o  female  Pt here for f/u Anx/Depr, Osteopenia, Inc glu, Vit D def, Urge incont  Since last visit, getting PT for back and R leg 2 xs a week  Has helped  Feeling more depressed due to not getting out as much  Doesn't feel like doing anything  Pt wants to try another med for depression  Wants to try zoloft  No cp/sob   Gets headaches at times due to stress  The following portions of the patient's history were reviewed and updated as appropriate:   Past Medical History:  She has a past medical history of Anxiety and Scoliosis  ,  _______________________________________________________________________  Medical Problems:  does not have any pertinent problems on file ,  _______________________________________________________________________  Past Surgical History:   has a past surgical history that includes Joint replacement; Back surgery (1974); Colonoscopy (01/01/2013); Kidney stone surgery; and ELBOW FRACTURE REPAIR (Left, 8/11/2020)  ,  _______________________________________________________________________  Family History:  family history includes Colon cancer in her maternal uncle; Hypertension in her father; Skin cancer in her mother ,  _______________________________________________________________________  Social History:   reports that she has never smoked  She has never used smokeless tobacco  She reports current alcohol use  She reports that she does not use drugs  ,  _______________________________________________________________________  Allergies:  is allergic to macrobid [nitrofurantoin]     _______________________________________________________________________  Current Outpatient Medications   Medication Sig Dispense Refill    ALPRAZolam (XANAX) 0 25 mg tablet Take 1 tablet (0 25 mg total) by mouth 3 (three) times a day as needed for anxiety 60 tablet 0    cholecalciferol (VITAMIN D3) 1,000 units tablet Take 1,000 Units by mouth daily      oxybutynin (DITROPAN-XL) 10 MG 24 hr tablet Take 1 tablet (10 mg total) by mouth daily at bedtime 90 tablet 3    amoxicillin (AMOXIL) 250 mg/5 mL oral suspension TAKE 40ML 1 HOUR BEFORE APPOINTMENT      sertraline (ZOLOFT) 50 mg tablet Take 1 tablet (50 mg total) by mouth daily 30 tablet 1     No current facility-administered medications for this visit  _______________________________________________________________________  Review of Systems   Constitutional: Negative for activity change, appetite change, fatigue and unexpected weight change  Respiratory: Negative for cough, chest tightness and shortness of breath  Cardiovascular: Negative for chest pain and palpitations  Gastrointestinal: Negative for abdominal pain, constipation, diarrhea, nausea and vomiting  Genitourinary: Negative for difficulty urinating  Musculoskeletal: Positive for gait problem  Negative for arthralgias  Skin: Negative for rash  Neurological: Negative for dizziness and headaches  Psychiatric/Behavioral: Positive for dysphoric mood  Negative for decreased concentration, sleep disturbance and suicidal ideas  The patient is nervous/anxious  Objective:  Vitals:    01/14/21 1443   BP: 134/70   Pulse: 92   Temp: 98 9 °F (37 2 °C)   SpO2: 97%   Weight: 63 kg (139 lb)   Height: 5' (1 524 m)     Body mass index is 27 15 kg/m²  Physical Exam  Vitals signs and nursing note reviewed  Constitutional:       Appearance: Normal appearance  She is well-developed  Comments: Uses walker   HENT:      Head: Normocephalic and atraumatic  Neck:      Musculoskeletal: Normal range of motion and neck supple  Cardiovascular:      Rate and Rhythm: Normal rate and regular rhythm  Heart sounds: Normal heart sounds  No murmur  Pulmonary:      Effort: Pulmonary effort is normal  No respiratory distress  Breath sounds: Normal breath sounds  No wheezing  Musculoskeletal:      Right lower leg: No edema  Left lower leg: No edema  Lymphadenopathy:      Cervical: No cervical adenopathy  Neurological:      Mental Status: She is alert and oriented to person, place, and time  Psychiatric:         Mood and Affect: Mood normal          Behavior: Behavior normal          Thought Content:  Thought content normal          Judgment: Judgment normal

## 2021-01-29 DIAGNOSIS — F41.9 ANXIETY: ICD-10-CM

## 2021-02-01 DIAGNOSIS — F41.9 ANXIETY: ICD-10-CM

## 2021-02-01 RX ORDER — ALPRAZOLAM 0.25 MG/1
0.25 TABLET ORAL 3 TIMES DAILY PRN
Qty: 60 TABLET | Refills: 0 | Status: SHIPPED | OUTPATIENT
Start: 2021-02-01 | End: 2021-03-12 | Stop reason: SDUPTHER

## 2021-02-01 RX ORDER — ALPRAZOLAM 0.25 MG/1
0.25 TABLET ORAL 3 TIMES DAILY PRN
Qty: 60 TABLET | Refills: 0 | OUTPATIENT
Start: 2021-02-01

## 2021-02-06 DIAGNOSIS — F41.8 MIXED ANXIETY AND DEPRESSIVE DISORDER: ICD-10-CM

## 2021-02-11 DIAGNOSIS — F41.8 MIXED ANXIETY AND DEPRESSIVE DISORDER: ICD-10-CM

## 2021-02-11 NOTE — TELEPHONE ENCOUNTER
Put patient on Zoloft 50mg, 1x a day  And she wants to know if it could be increased? Still no appetite, doesn't feel like doing anything  Or should she go back on paxil?     Patients ph # [phone]      [address]

## 2021-02-13 DIAGNOSIS — Z23 ENCOUNTER FOR IMMUNIZATION: ICD-10-CM

## 2021-02-19 ENCOUNTER — IMMUNIZATIONS (OUTPATIENT)
Dept: FAMILY MEDICINE CLINIC | Facility: HOSPITAL | Age: 66
End: 2021-02-19

## 2021-02-19 DIAGNOSIS — Z23 ENCOUNTER FOR IMMUNIZATION: Primary | ICD-10-CM

## 2021-02-19 PROCEDURE — 0001A SARS-COV-2 / COVID-19 MRNA VACCINE (PFIZER-BIONTECH) 30 MCG: CPT

## 2021-02-19 PROCEDURE — 91300 SARS-COV-2 / COVID-19 MRNA VACCINE (PFIZER-BIONTECH) 30 MCG: CPT

## 2021-02-24 ENCOUNTER — TELEMEDICINE (OUTPATIENT)
Dept: FAMILY MEDICINE CLINIC | Facility: CLINIC | Age: 66
End: 2021-02-24
Payer: COMMERCIAL

## 2021-02-24 VITALS — HEIGHT: 60 IN | WEIGHT: 130 LBS | BODY MASS INDEX: 25.52 KG/M2

## 2021-02-24 DIAGNOSIS — Z71.89 ADVICE GIVEN ABOUT COVID-19 VIRUS INFECTION: Primary | ICD-10-CM

## 2021-02-24 DIAGNOSIS — Z03.818 ENCOUNTER FOR OBSERVATION FOR SUSPECTED EXPOSURE TO OTHER BIOLOGICAL AGENTS RULED OUT: ICD-10-CM

## 2021-02-24 DIAGNOSIS — B34.9 VIRAL INFECTION, UNSPECIFIED: ICD-10-CM

## 2021-02-24 DIAGNOSIS — R51.9 ACUTE NONINTRACTABLE HEADACHE, UNSPECIFIED HEADACHE TYPE: ICD-10-CM

## 2021-02-24 DIAGNOSIS — R11.2 INTRACTABLE VOMITING WITH NAUSEA, UNSPECIFIED VOMITING TYPE: ICD-10-CM

## 2021-02-24 PROCEDURE — 1160F RVW MEDS BY RX/DR IN RCRD: CPT | Performed by: FAMILY MEDICINE

## 2021-02-24 PROCEDURE — 99214 OFFICE O/P EST MOD 30 MIN: CPT | Performed by: FAMILY MEDICINE

## 2021-02-24 PROCEDURE — U0003 INFECTIOUS AGENT DETECTION BY NUCLEIC ACID (DNA OR RNA); SEVERE ACUTE RESPIRATORY SYNDROME CORONAVIRUS 2 (SARS-COV-2) (CORONAVIRUS DISEASE [COVID-19]), AMPLIFIED PROBE TECHNIQUE, MAKING USE OF HIGH THROUGHPUT TECHNOLOGIES AS DESCRIBED BY CMS-2020-01-R: HCPCS | Performed by: FAMILY MEDICINE

## 2021-02-24 PROCEDURE — 1036F TOBACCO NON-USER: CPT | Performed by: FAMILY MEDICINE

## 2021-02-24 PROCEDURE — U0005 INFEC AGEN DETEC AMPLI PROBE: HCPCS | Performed by: FAMILY MEDICINE

## 2021-02-24 PROCEDURE — 3008F BODY MASS INDEX DOCD: CPT | Performed by: FAMILY MEDICINE

## 2021-02-24 RX ORDER — ONDANSETRON 4 MG/1
4 TABLET, FILM COATED ORAL EVERY 8 HOURS PRN
Qty: 20 TABLET | Refills: 0 | Status: SHIPPED | OUTPATIENT
Start: 2021-02-24 | End: 2021-05-13 | Stop reason: ALTCHOICE

## 2021-02-24 NOTE — PROGRESS NOTES
COVID-19 Virtual Visit     Assessment/Plan:    Problem List Items Addressed This Visit     None      Visit Diagnoses     Advice given about COVID-19 virus infection    -  Primary    Encounter for observation for suspected exposure to other biological agents ruled out        Relevant Orders    Novel Coronavirus (Covid-19),PCR [facility] - Collected at [facility]    Viral infection, unspecified        Relevant Orders    Novel Coronavirus (Covid-19),PCR [facility] - Collected at [facility]    Intractable vomiting with nausea, unspecified vomiting type        Relevant Medications    ondansetron (ZOFRAN) 4 mg tablet    Acute nonintractable headache, unspecified headache type             Disposition:     I referred patient to one of our centralized sites for a COVID-19 swab  Check for COVID- 19, discussed using masks, hand washing, avoiding crowded places, limiting social contact, go to ER if you develop -chest pain,fever>103 5,shortness of breath, lightheadedness  Could be vaccine related- severe immunogenic response   I have advised supportive treatment over-the-counter decongestant DayQuil ,Motrin pain or fever, advised to stay hydrated   Told to follow-up if the symptoms do not improve   Will follow-up with the lab results  I have spent 15 minutes directly with the patient  Greater than 50% of this time was spent in counseling/coordination of care regarding: prognosis, risks and benefits of treatment options, instructions for management, patient and family education and impressions  Discussed with spouse-he should wear mask around [facility] and wait for 5 days to get tested if he is negative  Encounter provider Ugo Celis MD    Provider located at [address] [place] [id number] [phone]    Recent Visits  No visits were found meeting these conditions     Showing recent visits within past 7 days and meeting all other requirements     Today's Visits  Date Type Provider Dept   02/24/21 Telemedicine Katlyn Swenson MD [address] [address] today's visits and meeting all other requirements     Future Appointments  No visits were found meeting these conditions  Showing future appointments within next 150 days and meeting all other requirements      This virtual check-in was done via [employer] and patient was informed that this is not a secure, HIPAA-compliant platform  She agrees to proceed  Patient agrees to participate in a virtual check in via telephone or video visit instead of presenting to the office to address urgent/immediate medical needs  Patient is aware this is a billable service  After connecting through [facility], the patient was identified by name and date of birth  Elizabeth Swain was informed that this was a telemedicine visit and that the exam was being conducted confidentially over secure lines  My office door was closed  No one else was in the room  Elizabeth Swain acknowledged consent and understanding of privacy and security of the telemedicine visit  I informed the patient that I have reviewed her record in Epic and presented the opportunity for her to ask any questions regarding the visit today  The patient agreed to participate  Subjective:   Elizabeth Swain is a 72 y o  female who is concerned about COVID-19  Patient's symptoms include fatigue, malaise, nausea, vomiting, myalgias and headache  Patient denies fever, chills, congestion, rhinorrhea, sore throat, anosmia, loss of taste, cough, shortness of breath, chest tightness, abdominal pain and diarrhea       Date of symptom onset: 2/22/2021    Exposure:   Contact with a person who is under investigation (PUI) for or who is positive for COVID-19 within the last 14 days?: No    Hospitalized recently for fever and/or lower respiratory symptoms?: No      Currently a healthcare worker that is involved in direct patient care?: No Works in a special setting where the risk of COVID-19 transmission may be high? (this may include long-term care, correctional and half-way facilities; homeless shelters; assisted-living facilities and group homes ): No      Resident in a special setting where the risk of COVID-19 transmission may be high? (this may include long-term care, correctional and half-way facilities; homeless shelters; assisted-living facilities and group homes ): No      Goes for PT twice a week-not sure if she has been exposed there  [employer] vaccine on 2/19/2021    Lab Results   Component Value Date    SARSCOV2 Negative 08/10/2020     Past Medical History:   Diagnosis Date    Anxiety     Scoliosis      Past Surgical History:   Procedure Laterality Date    BACK SURGERY  1974    velarde damon in back for scoliosis    COLONOSCOPY  01/01/2013    ELBOW FRACTURE REPAIR Left 8/11/2020    Procedure: ORIF left humeral shaft, ulnar nerve release, tenotomy anconeus epitrochlearis, radial nerve dissection;  Surgeon: Lizette Rainey MD;  Location: [facility];  Service: Orthopedics    JOINT REPLACEMENT      left hip and knee    KIDNEY STONE SURGERY       Current Outpatient Medications   Medication Sig Dispense Refill    ALPRAZolam (XANAX) 0 25 mg tablet Take 1 tablet (0 25 mg total) by mouth 3 (three) times a day as needed for anxiety 60 tablet 0    cholecalciferol (VITAMIN D3) 1,000 units tablet Take 1,000 Units by mouth daily      oxybutynin (DITROPAN-XL) 10 MG 24 hr tablet Take 1 tablet (10 mg total) by mouth daily at bedtime 90 tablet 3    sertraline (ZOLOFT) 50 mg tablet Take 1 5 tabs po daily 45 tablet 2    amoxicillin (AMOXIL) 250 mg/5 mL oral suspension TAKE 40ML 1 HOUR BEFORE APPOINTMENT      ondansetron (ZOFRAN) 4 mg tablet Take 1 tablet (4 mg total) by mouth every 8 (eight) hours as needed for nausea or vomiting 20 tablet 0     No current facility-administered medications for this visit        Allergies   Allergen Reactions    Macrobid [Nitrofurantoin]        Review of Systems   Constitutional: Positive for fatigue  Negative for chills and fever  HENT: Negative for congestion, rhinorrhea and sore throat  Respiratory: Negative for cough, chest tightness and shortness of breath  Gastrointestinal: Positive for nausea and vomiting  Negative for abdominal pain and diarrhea  Musculoskeletal: Positive for myalgias  Neurological: Positive for headaches  Objective:    Vitals:    02/24/21 1124   Weight: 59 kg (130 lb)   Height: 5' (1 524 m)       Physical Exam  Vitals signs and nursing note reviewed  Constitutional:       Appearance: She is well-developed  She is ill-appearing  HENT:      Head: Normocephalic and atraumatic  Right Ear: External ear normal       Left Ear: External ear normal    Eyes:      General: No scleral icterus  Conjunctiva/sclera: Conjunctivae normal    Pulmonary:      Effort: Pulmonary effort is normal  No respiratory distress  Abdominal:      Palpations: Abdomen is soft  Tenderness: There is no abdominal tenderness  Comments: Self exam   Skin:     Coloration: Skin is not jaundiced or pale  Neurological:      General: No focal deficit present  Mental Status: She is alert  Mental status is at baseline  Psychiatric:         Mood and Affect: Mood normal          Behavior: Behavior normal        VIRTUAL VISIT DISCLAIMER    Nehemiah Peters acknowledges that she has consented to an online visit or consultation  She understands that the online visit is based solely on information provided by her, and that, in the absence of a face-to-face physical evaluation by the physician, the diagnosis she receives is both limited and provisional in terms of accuracy and completeness  This is not intended to replace a full medical face-to-face evaluation by the physician  Nehemiah Peters understands and accepts these terms

## 2021-02-25 LAB — SARS-COV-2 RNA RESP QL NAA+PROBE: NEGATIVE

## 2021-02-25 NOTE — RESULT ENCOUNTER NOTE
Please call Emelia Olson and let her know that her COVID-19 swab was negative  Please advise her to continue social distancing procedures

## 2021-03-01 ENCOUNTER — TELEPHONE (OUTPATIENT)
Dept: FAMILY MEDICINE CLINIC | Facility: CLINIC | Age: 66
End: 2021-03-01

## 2021-03-01 DIAGNOSIS — F41.8 MIXED ANXIETY AND DEPRESSIVE DISORDER: Primary | ICD-10-CM

## 2021-03-01 RX ORDER — PAROXETINE HYDROCHLORIDE 20 MG/1
20 TABLET, FILM COATED ORAL DAILY
Qty: 90 TABLET | Refills: 2 | Status: SHIPPED | OUTPATIENT
Start: 2021-03-01 | End: 2021-05-14 | Stop reason: SDUPTHER

## 2021-03-01 NOTE — TELEPHONE ENCOUNTER
I responded to her message that she sent to me  It said that it is ok to switch back to her paxil and I sent in a refill to her pharmacy

## 2021-03-01 NOTE — TELEPHONE ENCOUNTER
Patient has left several messages on our voice mail stating that you sent her something on "MY CHART", but she is not able to access this  She's not sure what this is about

## 2021-03-12 ENCOUNTER — IMMUNIZATIONS (OUTPATIENT)
Dept: FAMILY MEDICINE CLINIC | Facility: HOSPITAL | Age: 66
End: 2021-03-12

## 2021-03-12 DIAGNOSIS — F41.9 ANXIETY: ICD-10-CM

## 2021-03-12 DIAGNOSIS — Z23 ENCOUNTER FOR IMMUNIZATION: Primary | ICD-10-CM

## 2021-03-12 PROCEDURE — 0002A SARS-COV-2 / COVID-19 MRNA VACCINE (PFIZER-BIONTECH) 30 MCG: CPT

## 2021-03-12 PROCEDURE — 91300 SARS-COV-2 / COVID-19 MRNA VACCINE (PFIZER-BIONTECH) 30 MCG: CPT

## 2021-03-15 RX ORDER — ALPRAZOLAM 0.25 MG/1
0.25 TABLET ORAL 3 TIMES DAILY PRN
Qty: 60 TABLET | Refills: 0 | Status: SHIPPED | OUTPATIENT
Start: 2021-03-15 | End: 2021-05-02 | Stop reason: SDUPTHER

## 2021-03-24 DIAGNOSIS — N39.41 URGE INCONTINENCE OF URINE: ICD-10-CM

## 2021-03-24 RX ORDER — OXYBUTYNIN CHLORIDE 10 MG/1
10 TABLET, EXTENDED RELEASE ORAL
Qty: 90 TABLET | Refills: 0 | Status: SHIPPED | OUTPATIENT
Start: 2021-03-24 | End: 2021-06-01

## 2021-03-24 RX ORDER — OXYBUTYNIN CHLORIDE 10 MG/1
10 TABLET, EXTENDED RELEASE ORAL
Qty: 90 TABLET | Refills: 0 | Status: SHIPPED | OUTPATIENT
Start: 2021-03-24 | End: 2021-03-24 | Stop reason: SDUPTHER

## 2021-05-02 DIAGNOSIS — F41.9 ANXIETY: ICD-10-CM

## 2021-05-03 RX ORDER — ALPRAZOLAM 0.25 MG/1
0.25 TABLET ORAL 3 TIMES DAILY PRN
Qty: 60 TABLET | Refills: 0 | Status: SHIPPED | OUTPATIENT
Start: 2021-05-03 | End: 2021-06-04 | Stop reason: SDUPTHER

## 2021-05-10 DIAGNOSIS — F32.A CHRONIC DEPRESSION: Primary | ICD-10-CM

## 2021-05-10 RX ORDER — DULOXETIN HYDROCHLORIDE 60 MG/1
CAPSULE, DELAYED RELEASE ORAL
Qty: 90 CAPSULE | Refills: 3 | Status: SHIPPED | OUTPATIENT
Start: 2021-05-10 | End: 2021-05-13 | Stop reason: ALTCHOICE

## 2021-05-13 ENCOUNTER — OFFICE VISIT (OUTPATIENT)
Dept: FAMILY MEDICINE CLINIC | Facility: CLINIC | Age: 66
End: 2021-05-13
Payer: COMMERCIAL

## 2021-05-13 VITALS
HEART RATE: 88 BPM | RESPIRATION RATE: 16 BRPM | TEMPERATURE: 97.4 F | OXYGEN SATURATION: 98 % | DIASTOLIC BLOOD PRESSURE: 70 MMHG | HEIGHT: 60 IN | SYSTOLIC BLOOD PRESSURE: 130 MMHG | BODY MASS INDEX: 26.31 KG/M2 | WEIGHT: 134 LBS

## 2021-05-13 DIAGNOSIS — Z12.31 ENCOUNTER FOR SCREENING MAMMOGRAM FOR BREAST CANCER: ICD-10-CM

## 2021-05-13 DIAGNOSIS — Z13.820 ENCOUNTER FOR OSTEOPOROSIS SCREENING IN ASYMPTOMATIC POSTMENOPAUSAL PATIENT: ICD-10-CM

## 2021-05-13 DIAGNOSIS — F41.8 MIXED ANXIETY AND DEPRESSIVE DISORDER: Primary | ICD-10-CM

## 2021-05-13 DIAGNOSIS — E55.9 VITAMIN D DEFICIENCY: ICD-10-CM

## 2021-05-13 DIAGNOSIS — N39.41 URGE INCONTINENCE OF URINE: ICD-10-CM

## 2021-05-13 DIAGNOSIS — Z78.0 ENCOUNTER FOR OSTEOPOROSIS SCREENING IN ASYMPTOMATIC POSTMENOPAUSAL PATIENT: ICD-10-CM

## 2021-05-13 DIAGNOSIS — R73.01 IMPAIRED FASTING GLUCOSE: ICD-10-CM

## 2021-05-13 DIAGNOSIS — M85.851 OSTEOPENIA OF RIGHT HIP: ICD-10-CM

## 2021-05-13 PROCEDURE — 99214 OFFICE O/P EST MOD 30 MIN: CPT | Performed by: FAMILY MEDICINE

## 2021-05-13 PROCEDURE — 1160F RVW MEDS BY RX/DR IN RCRD: CPT | Performed by: FAMILY MEDICINE

## 2021-05-13 PROCEDURE — 3008F BODY MASS INDEX DOCD: CPT | Performed by: FAMILY MEDICINE

## 2021-05-13 PROCEDURE — 3725F SCREEN DEPRESSION PERFORMED: CPT | Performed by: FAMILY MEDICINE

## 2021-05-13 PROCEDURE — 1036F TOBACCO NON-USER: CPT | Performed by: FAMILY MEDICINE

## 2021-05-13 NOTE — PROGRESS NOTES
Assessment/Plan:         Problem List Items Addressed This Visit        Endocrine    Impaired fasting glucose     A1C 5 6 in Dec 2020  Will follow in 6-12 mths  Discussed low carb diet  Musculoskeletal and Integument    Osteopenia of right hip     Last dexascan was in March 2019  Cont Vit D  Pt advised again to schedule dexascan  Other    Vitamin D deficiency     Level was 29 in Dec 2020  Continue 1000 U qd  Urge incontinence of urine     Stable  Continue oxybutynin 10 mg qd  Mixed anxiety and depressive disorder - Primary     Pt doing ok  Continue current medications  Other Visit Diagnoses     Encounter for screening mammogram for breast cancer        Relevant Orders    Mammo screening bilateral w cad    Encounter for osteoporosis screening in asymptomatic postmenopausal patient        Relevant Orders    DXA bone density spine hip and pelvis            Subjective:      Patient ID: Chevy Peterson is a 72 y o  female  Pt here for f/u Anxiety/Depr, Osteopenia, Vit D def, Urge Incontinence, Impaired Fasting Glucose  Pt doing ok  No cp/sob  No headaches  Has back pain andpain in arms since fell and broke her L humerus in Aug 2020  Pt had PT and helped some  Pt hasn't seen Dr Quinton Rayo recently  Mood has been ok  Tried zoloft but had SEs  Pt restarted paxil and taking xanax bid  Oxybutynin 10 mg working ok for urge incontinence  Needs to schedule mammogram and dexascan  The following portions of the patient's history were reviewed and updated as appropriate:   Past Medical History:  She has a past medical history of Anxiety and Scoliosis  ,  _______________________________________________________________________  Medical Problems:  does not have any pertinent problems on file ,  _______________________________________________________________________  Past Surgical History:   has a past surgical history that includes Joint replacement; Back surgery (1974); Colonoscopy (01/01/2013); Kidney stone surgery; and ELBOW FRACTURE REPAIR (Left, 8/11/2020)  ,  _______________________________________________________________________  Family History:  family history includes Colon cancer in her maternal uncle; Hypertension in her father; Skin cancer in her mother ,  _______________________________________________________________________  Social History:   reports that she has never smoked  She has never used smokeless tobacco  She reports current alcohol use  She reports current drug use  Drug: Marijuana  ,  _______________________________________________________________________  Allergies:  is allergic to macrobid [nitrofurantoin]     _______________________________________________________________________  Current Outpatient Medications   Medication Sig Dispense Refill    ALPRAZolam (XANAX) 0 25 mg tablet Take 1 tablet (0 25 mg total) by mouth 3 (three) times a day as needed for anxiety 60 tablet 0    amoxicillin (AMOXIL) 250 mg/5 mL oral suspension TAKE 40ML 1 HOUR BEFORE APPOINTMENT      cholecalciferol (VITAMIN D3) 1,000 units tablet Take 1,000 Units by mouth daily      oxybutynin (DITROPAN-XL) 10 MG 24 hr tablet Take 1 tablet (10 mg total) by mouth daily at bedtime 90 tablet 0    PARoxetine (PAXIL) 20 mg tablet Take 1 tablet (20 mg total) by mouth daily 90 tablet 2     No current facility-administered medications for this visit       _______________________________________________________________________  Review of Systems   Constitutional: Negative for activity change, appetite change, fatigue and unexpected weight change  Respiratory: Negative for cough, chest tightness and shortness of breath  Cardiovascular: Negative for chest pain and palpitations  Gastrointestinal: Negative for abdominal pain, constipation, diarrhea, nausea and vomiting  Genitourinary: Negative for difficulty urinating  Musculoskeletal: Positive for gait problem  Negative for arthralgias  Skin: Negative for rash  Neurological: Negative for dizziness and headaches  Psychiatric/Behavioral: Positive for dysphoric mood  Negative for decreased concentration, sleep disturbance and suicidal ideas  The patient is nervous/anxious  Objective:  Vitals:    05/13/21 1439   BP: 130/70   BP Location: Left arm   Patient Position: Sitting   Cuff Size: Adult   Pulse: 88   Resp: 16   Temp: (!) 97 4 °F (36 3 °C)   TempSrc: Temporal   SpO2: 98%   Weight: 60 8 kg (134 lb)   Height: 5' (1 524 m)     Body mass index is 26 17 kg/m²  Physical Exam  Vitals signs and nursing note reviewed  Constitutional:       Appearance: Normal appearance  She is well-developed  Comments: Pt uses walker   HENT:      Head: Normocephalic and atraumatic  Neck:      Musculoskeletal: Normal range of motion and neck supple  Cardiovascular:      Rate and Rhythm: Normal rate and regular rhythm  Heart sounds: Normal heart sounds  No murmur  Pulmonary:      Effort: Pulmonary effort is normal  No respiratory distress  Breath sounds: Normal breath sounds  No wheezing  Musculoskeletal:      Right lower leg: No edema  Left lower leg: No edema  Comments: TTP R ant shoulder   Lymphadenopathy:      Cervical: No cervical adenopathy  Neurological:      Mental Status: She is alert and oriented to person, place, and time  Psychiatric:         Mood and Affect: Mood normal          Behavior: Behavior normal          Thought Content:  Thought content normal          Judgment: Judgment normal

## 2021-05-14 ENCOUNTER — TELEPHONE (OUTPATIENT)
Dept: ADMINISTRATIVE | Facility: OTHER | Age: 66
End: 2021-05-14

## 2021-05-14 DIAGNOSIS — F41.8 MIXED ANXIETY AND DEPRESSIVE DISORDER: ICD-10-CM

## 2021-05-14 RX ORDER — PAROXETINE HYDROCHLORIDE 20 MG/1
20 TABLET, FILM COATED ORAL DAILY
Qty: 90 TABLET | Refills: 2 | Status: SHIPPED | OUTPATIENT
Start: 2021-05-14 | End: 2021-09-16 | Stop reason: ALTCHOICE

## 2021-05-14 NOTE — TELEPHONE ENCOUNTER
----- Message from Zo Robbins sent at 5/13/2021  3:24 PM EDT -----  Regarding: care gap request  05/13/21 3:24 PM    Hello, our patient attached above has had CRC: Colonoscopy completed/performed  Please assist in updating the patient chart by making an External outreach to Dr Evelia Jessica facility located in [place]  The date of service is 2006-1096?     Thank you,  Enedina Shea  [facility]

## 2021-05-14 NOTE — LETTER
Procedure Request Form: Colonoscopy      Date Requested: 21  Patient: Rosen Shawn  Patient : 1955   Referring Provider: Charol Spindle, MD        Date of Procedure ______________________________       The above patient has informed us that they have completed their   most recent Colonoscopy at your facility  Please complete   this form and attach all corresponding procedure reports/results  Comments __________________________________________________________  ____________________________________________________________________  ____________________________________________________________________  ____________________________________________________________________    Facility Completing Procedure _________________________________________    Form Completed By (print name) _______________________________________      Signature __________________________________________________________      These reports are needed for  compliance    Please fax this completed form and a copy of the procedure report to our office located at [place] [phone] as soon as possible to [phone] attention Evelia: Phone [phone]    We thank you for your assistance in treating our mutual patient     (sent to [address] [place] [address])

## 2021-05-17 ENCOUNTER — TELEPHONE (OUTPATIENT)
Dept: FAMILY MEDICINE CLINIC | Facility: CLINIC | Age: 66
End: 2021-05-17

## 2021-05-17 NOTE — TELEPHONE ENCOUNTER
Received record request response from [facility] with only discharge instructions for procedure

## 2021-05-17 NOTE — TELEPHONE ENCOUNTER
Upon review of the In Basket request and the patient's chart, initial outreach has been made via fax, please see Contacts section for details       Thank you  Mio Srivastava MA

## 2021-05-17 NOTE — TELEPHONE ENCOUNTER
Upon review of the In Basket request and the patient's chart, initial outreach has been made via telephone call, please see Contacts section for details       Thank you  Krista Dunham MA

## 2021-05-19 NOTE — TELEPHONE ENCOUNTER
Upon review of the In Basket request we were able to locate, review, and update the patient chart as requested for CRC: Colonoscopy  Any additional questions or concerns should be emailed to the Practice Liaisons via [email or web address] email, please do not reply via In Basket      Thank you  Lucio Stallings MA

## 2021-06-01 DIAGNOSIS — N39.41 URGE INCONTINENCE OF URINE: ICD-10-CM

## 2021-06-01 RX ORDER — OXYBUTYNIN CHLORIDE 10 MG/1
TABLET, EXTENDED RELEASE ORAL
Qty: 90 TABLET | Refills: 3 | Status: SHIPPED | OUTPATIENT
Start: 2021-06-01 | End: 2022-02-18 | Stop reason: ALTCHOICE

## 2021-06-04 DIAGNOSIS — F41.9 ANXIETY: ICD-10-CM

## 2021-06-04 RX ORDER — ALPRAZOLAM 0.25 MG/1
0.25 TABLET ORAL 3 TIMES DAILY PRN
Qty: 60 TABLET | Refills: 0 | Status: SHIPPED | OUTPATIENT
Start: 2021-06-04 | End: 2021-07-06 | Stop reason: SDUPTHER

## 2021-07-02 ENCOUNTER — TELEPHONE (OUTPATIENT)
Dept: FAMILY MEDICINE CLINIC | Facility: CLINIC | Age: 66
End: 2021-07-02

## 2021-07-02 NOTE — TELEPHONE ENCOUNTER
Patient needs a prescription sent into [facility] for a new walker  A piece broke off that she needs to be able to fold her walker  Folding Junior Lela Vences  2 button with 5 inch wheels   Fax: [phone]

## 2021-07-06 DIAGNOSIS — F41.9 ANXIETY: ICD-10-CM

## 2021-07-06 RX ORDER — ALPRAZOLAM 0.25 MG/1
0.25 TABLET ORAL 3 TIMES DAILY PRN
Qty: 60 TABLET | Refills: 0 | Status: SHIPPED | OUTPATIENT
Start: 2021-07-06 | End: 2021-08-08 | Stop reason: SDUPTHER

## 2021-08-04 ENCOUNTER — APPOINTMENT (OUTPATIENT)
Dept: RADIOLOGY | Facility: AMBULARY SURGERY CENTER | Age: 66
End: 2021-08-04
Attending: SURGERY
Payer: COMMERCIAL

## 2021-08-04 ENCOUNTER — OFFICE VISIT (OUTPATIENT)
Dept: OBGYN CLINIC | Facility: CLINIC | Age: 66
End: 2021-08-04
Payer: COMMERCIAL

## 2021-08-04 VITALS
BODY MASS INDEX: 24.55 KG/M2 | DIASTOLIC BLOOD PRESSURE: 77 MMHG | SYSTOLIC BLOOD PRESSURE: 136 MMHG | HEART RATE: 80 BPM | HEIGHT: 61 IN | RESPIRATION RATE: 17 BRPM | WEIGHT: 130 LBS

## 2021-08-04 DIAGNOSIS — Z98.890 S/P ORIF (OPEN REDUCTION INTERNAL FIXATION) FRACTURE: Primary | ICD-10-CM

## 2021-08-04 DIAGNOSIS — Z87.81 S/P ORIF (OPEN REDUCTION INTERNAL FIXATION) FRACTURE: ICD-10-CM

## 2021-08-04 DIAGNOSIS — M25.522 PAIN IN LEFT ELBOW: ICD-10-CM

## 2021-08-04 DIAGNOSIS — Z98.890 S/P ORIF (OPEN REDUCTION INTERNAL FIXATION) FRACTURE: ICD-10-CM

## 2021-08-04 DIAGNOSIS — Z87.81 S/P ORIF (OPEN REDUCTION INTERNAL FIXATION) FRACTURE: Primary | ICD-10-CM

## 2021-08-04 PROCEDURE — 1160F RVW MEDS BY RX/DR IN RCRD: CPT | Performed by: SURGERY

## 2021-08-04 PROCEDURE — 99213 OFFICE O/P EST LOW 20 MIN: CPT | Performed by: SURGERY

## 2021-08-04 PROCEDURE — 73080 X-RAY EXAM OF ELBOW: CPT

## 2021-08-04 PROCEDURE — 1036F TOBACCO NON-USER: CPT | Performed by: SURGERY

## 2021-08-04 PROCEDURE — 3008F BODY MASS INDEX DOCD: CPT | Performed by: SURGERY

## 2021-08-04 NOTE — PROGRESS NOTES
ASSESSMENT/PLAN:      77 y o  female 1 year s/p left humeral shaft ORIF, ulnar nerve release, radial nerve dissection and tenotomy anconeus epitrochlearis, DOS 8/11/2020  Overall Refugio Johnson is doing well  She has full elbow ROM  No evidence of lucency of halo affect  Activities to tolerance, no restrictions  Follow up in the office as needed if symptoms worsen or fail to improve  The patient verbalized understanding of exam findings and treatment plan  We engaged in the shared decision-making process and treatment options were discussed at length with the patient  Surgical and conservative management discussed today along with risks and benefits  Diagnoses and all orders for this visit:    S/P ORIF (open reduction internal fixation) fracture  -     XR elbow 3+ vw left; Future      Follow Up:  Return if symptoms worsen or fail to improve  To Do Next Visit:  Re-evaluation of current issue    ____________________________________________________________________________________________________________________________________________      CHIEF COMPLAINT:  Chief Complaint   Patient presents with    Left Elbow - Pain, Follow-up       SUBJECTIVE:  David Lemus is a 77y o  year old  female who presents to the office today a 1 year follow up s/p left humeral shaft ORIF, DOS 8/11/2020  Overall Refugio Johnson is doing well  She notes her left elbow is better then her right  Elbow ROM is full  I have personally reviewed all the relevant PMH, PSH, SH, FH, Medications and allergies       PAST MEDICAL HISTORY:  Past Medical History:   Diagnosis Date    Anxiety     Scoliosis        PAST SURGICAL HISTORY:  Past Surgical History:   Procedure Laterality Date    BACK SURGERY  1974    velarde damon in back for scoliosis    COLONOSCOPY  01/01/2013    ELBOW FRACTURE REPAIR Left 8/11/2020    Procedure: ORIF left humeral shaft, ulnar nerve release, tenotomy anconeus epitrochlearis, radial nerve dissection; Surgeon: Rosezella Curling, MD;  [facility]: [facility];  Service: Orthopedics    JOINT REPLACEMENT      left hip and knee    KIDNEY STONE SURGERY         FAMILY HISTORY:  Family History   Problem Relation Age of Onset    Skin cancer Mother     Hypertension Father     Colon cancer Maternal Uncle        SOCIAL HISTORY:  Social History     Tobacco Use    Smoking status: Never Smoker    Smokeless tobacco: Never Used   Vaping Use    Vaping Use: Never used   Substance Use Topics    Alcohol use: Yes    Drug use: Yes     Types: Marijuana     Comment: Medical Marijuana       MEDICATIONS:    Current Outpatient Medications:     ALPRAZolam (XANAX) 0 25 mg tablet, Take 1 tablet (0 25 mg total) by mouth 3 (three) times a day as needed for anxiety, Disp: 60 tablet, Rfl: 0    cholecalciferol (VITAMIN D3) 1,000 units tablet, Take 1,000 Units by mouth daily, Disp: , Rfl:     oxybutynin (DITROPAN-XL) 10 MG 24 hr tablet, TAKE 1 TABLET DAILY AT BEDTIME, Disp: 90 tablet, Rfl: 3    PARoxetine (PAXIL) 20 mg tablet, Take 1 tablet (20 mg total) by mouth daily, Disp: 90 tablet, Rfl: 2    amoxicillin (AMOXIL) 250 mg/5 mL oral suspension, TAKE 40ML 1 HOUR BEFORE APPOINTMENT (Patient not taking: Reported on 8/4/2021), Disp: , Rfl:     ALLERGIES:  Allergies   Allergen Reactions    Macrobid [Nitrofurantoin]        REVIEW OF SYSTEMS:  Review of Systems   Constitutional: Negative for chills, fever and unexpected weight change  HENT: Negative for hearing loss, nosebleeds and sore throat  Eyes: Negative for pain, redness and visual disturbance  Respiratory: Negative for cough, shortness of breath and wheezing  Cardiovascular: Negative for chest pain, palpitations and leg swelling  Gastrointestinal: Negative for abdominal pain, nausea and vomiting  Endocrine: Negative for polydipsia and polyuria  Genitourinary: Negative for difficulty urinating and hematuria     Musculoskeletal: Negative for arthralgias, joint swelling and myalgias  Skin: Negative for rash and wound  Neurological: Negative for dizziness, numbness and headaches  Psychiatric/Behavioral: Negative for decreased concentration, dysphoric mood and suicidal ideas  The patient is not nervous/anxious  VITALS:  Vitals:    08/04/21 1501   BP: 136/77   Pulse: 80   Resp: 17       LABS:  HgA1c:   Lab Results   Component Value Date    HGBA1C 5 6 12/01/2020     BMP:   Lab Results   Component Value Date    CALCIUM 7 5 (L) 08/13/2020    K 3 9 08/13/2020    CO2 31 08/13/2020     08/13/2020    BUN 15 08/13/2020    CREATININE 0 36 (L) 08/13/2020       _____________________________________________________  PHYSICAL EXAMINATION:  General: well developed and well nourished, alert, oriented times 3 and appears comfortable  Psychiatric: Normal  HEENT: Normocephalic, Atraumatic Trachea Midline, No torticollis  Pulmonary: No audible wheezing or respiratory distress   Cardiovascular: No pitting edema, 2+ radial pulse   Abdominal/GI: abdomen non tender, non distended   Skin: No masses, erythema, lacerations, fluctation, ulcerations  Neurovascular: Sensation Intact to the Median, Ulnar, Radial Nerve, Motor Intact to the Median, Ulnar, Radial Nerve and Pulses Intact  Musculoskeletal: Normal, except as noted in detailed exam and in HPI        MUSCULOSKELETAL EXAMINATION:    Left elbow:     No erythema, ecchymosis or edema  Well healed surgical incision   Full elbow ROM     ___________________________________________________  STUDIES REVIEWED:  I have personally reviewed AP lateral and oblique radiographs of  Left elbow   which demonstrate  Healed humeral shaft fracture with near anatomic alignment no hardware complication           PROCEDURES PERFORMED:  Procedures  No Procedures performed today    _____________________________________________________      Clare Johnston    I,:  Herbert Sanchez am acting as a scribe while in the presence of the attending physician : I,:  Deena Muñoz MD personally performed the services described in this documentation    as scribed in my presence :

## 2021-08-08 DIAGNOSIS — F41.9 ANXIETY: ICD-10-CM

## 2021-08-09 RX ORDER — ALPRAZOLAM 0.25 MG/1
0.25 TABLET ORAL 3 TIMES DAILY PRN
Qty: 60 TABLET | Refills: 0 | Status: SHIPPED | OUTPATIENT
Start: 2021-08-09 | End: 2021-09-23 | Stop reason: SDUPTHER

## 2021-08-25 ENCOUNTER — HOSPITAL ENCOUNTER (OUTPATIENT)
Dept: RADIOLOGY | Age: 66
Discharge: HOME | End: 2021-08-25
Payer: COMMERCIAL

## 2021-08-25 VITALS — BODY MASS INDEX: 26.5 KG/M2 | WEIGHT: 135 LBS | HEIGHT: 60 IN

## 2021-08-25 DIAGNOSIS — Z12.31 ENCOUNTER FOR SCREENING MAMMOGRAM FOR BREAST CANCER: ICD-10-CM

## 2021-08-25 DIAGNOSIS — M85.851 OSTEOPENIA OF RIGHT HIP: ICD-10-CM

## 2021-08-25 DIAGNOSIS — Z12.31 ENCOUNTER FOR SCREENING MAMMOGRAM FOR MALIGNANT NEOPLASM OF BREAST: ICD-10-CM

## 2021-08-25 DIAGNOSIS — Z78.0 ENCOUNTER FOR OSTEOPOROSIS SCREENING IN ASYMPTOMATIC POSTMENOPAUSAL PATIENT: ICD-10-CM

## 2021-08-25 DIAGNOSIS — Z13.820 ENCOUNTER FOR OSTEOPOROSIS SCREENING IN ASYMPTOMATIC POSTMENOPAUSAL PATIENT: ICD-10-CM

## 2021-08-25 PROCEDURE — 77080 DXA BONE DENSITY AXIAL: CPT

## 2021-08-25 PROCEDURE — 77067 SCR MAMMO BI INCL CAD: CPT

## 2021-08-25 PROCEDURE — 77063 BREAST TOMOSYNTHESIS BI: CPT

## 2021-09-16 ENCOUNTER — OFFICE VISIT (OUTPATIENT)
Dept: FAMILY MEDICINE CLINIC | Facility: CLINIC | Age: 66
End: 2021-09-16
Payer: COMMERCIAL

## 2021-09-16 VITALS
SYSTOLIC BLOOD PRESSURE: 140 MMHG | HEART RATE: 78 BPM | DIASTOLIC BLOOD PRESSURE: 70 MMHG | WEIGHT: 136 LBS | BODY MASS INDEX: 26.7 KG/M2 | HEIGHT: 60 IN | TEMPERATURE: 97.8 F | OXYGEN SATURATION: 99 %

## 2021-09-16 DIAGNOSIS — N39.41 URGE INCONTINENCE OF URINE: ICD-10-CM

## 2021-09-16 DIAGNOSIS — Z23 ENCOUNTER FOR IMMUNIZATION: ICD-10-CM

## 2021-09-16 DIAGNOSIS — R73.01 IMPAIRED FASTING GLUCOSE: ICD-10-CM

## 2021-09-16 DIAGNOSIS — M81.0 AGE RELATED OSTEOPOROSIS, UNSPECIFIED PATHOLOGICAL FRACTURE PRESENCE: ICD-10-CM

## 2021-09-16 DIAGNOSIS — F41.8 MIXED ANXIETY AND DEPRESSIVE DISORDER: Primary | ICD-10-CM

## 2021-09-16 DIAGNOSIS — Z13.6 SCREENING FOR CARDIOVASCULAR CONDITION: ICD-10-CM

## 2021-09-16 DIAGNOSIS — E55.9 VITAMIN D DEFICIENCY: ICD-10-CM

## 2021-09-16 PROBLEM — K59.00 CONSTIPATION: Status: RESOLVED | Noted: 2020-08-10 | Resolved: 2021-09-16

## 2021-09-16 PROBLEM — D64.9 ANEMIA: Status: RESOLVED | Noted: 2020-09-14 | Resolved: 2021-09-16

## 2021-09-16 PROCEDURE — 90682 RIV4 VACC RECOMBINANT DNA IM: CPT | Performed by: FAMILY MEDICINE

## 2021-09-16 PROCEDURE — 3725F SCREEN DEPRESSION PERFORMED: CPT | Performed by: FAMILY MEDICINE

## 2021-09-16 PROCEDURE — 99214 OFFICE O/P EST MOD 30 MIN: CPT | Performed by: FAMILY MEDICINE

## 2021-09-16 PROCEDURE — 1036F TOBACCO NON-USER: CPT | Performed by: FAMILY MEDICINE

## 2021-09-16 PROCEDURE — 90471 IMMUNIZATION ADMIN: CPT | Performed by: FAMILY MEDICINE

## 2021-09-16 PROCEDURE — 3288F FALL RISK ASSESSMENT DOCD: CPT | Performed by: FAMILY MEDICINE

## 2021-09-16 PROCEDURE — 3008F BODY MASS INDEX DOCD: CPT | Performed by: FAMILY MEDICINE

## 2021-09-16 PROCEDURE — 1160F RVW MEDS BY RX/DR IN RCRD: CPT | Performed by: FAMILY MEDICINE

## 2021-09-16 PROCEDURE — 1100F PTFALLS ASSESS-DOCD GE2>/YR: CPT | Performed by: FAMILY MEDICINE

## 2021-09-16 RX ORDER — DULOXETIN HYDROCHLORIDE 30 MG/1
30 CAPSULE, DELAYED RELEASE ORAL DAILY
Qty: 7 CAPSULE | Refills: 0 | Status: SHIPPED | OUTPATIENT
Start: 2021-09-16 | End: 2022-05-23

## 2021-09-16 NOTE — ASSESSMENT & PLAN NOTE
Last dexascan was in Aug 2021 and has slight decrease  Was on Fosamax in the past  Will try to get approved for Prolia

## 2021-09-16 NOTE — ASSESSMENT & PLAN NOTE
Mood and stress level have been worse  Will try cymbalta again  Pt to wean off paxil and then start cymbalta

## 2021-09-16 NOTE — PROGRESS NOTES
Assessment/Plan:         Problem List Items Addressed This Visit        Endocrine    Impaired fasting glucose     Recheck A1C with next labs  Continue low carb diet  Relevant Orders    Hemoglobin A1C       Musculoskeletal and Integument    Age related osteoporosis     Last dexascan was in Aug 2021 and has slight decrease  Was on Fosamax in the past  Will try to get approved for Prolia  Other    Vitamin D deficiency     Recheck level  Continue 1000 U qd  Relevant Orders    Vitamin D 25 hydroxy    Urge incontinence of urine     Has increased sxs  Will try myrbetriq again  Relevant Medications    Mirabegron ER 25 MG TB24    Mixed anxiety and depressive disorder - Primary     Mood and stress level have been worse  Will try cymbalta again  Pt to wean off paxil and then start cymbalta  Relevant Medications    DULoxetine (CYMBALTA) 30 mg delayed release capsule      Other Visit Diagnoses     Screening for cardiovascular condition        Relevant Orders    Lipid panel    Encounter for immunization        Relevant Orders    influenza vaccine, quadrivalent, recombinant, PF, 0 5 mL, for patients 18 yr+ (FLUBLOK)            Subjective:      Patient ID: David Lemus is a 77 y o  female  Pt here for f/u Anxiety/Depression, Osteopenia, Impaired Fasting Glucose, Vit D def, Urge incontinence  Doing ok  No cp/sob  No headaches  No abd pain  Has been more anxious and depressed  Takes xanax bid and paxil 20 mg qd  Pt wants to try cymbalta again  Had dexascan in Aug 2021 and bones a little thinner  Still gets urge incontinence and oxybutynin only helps some  Myrbetriq worked better in the past  Wants flu shot  The following portions of the patient's history were reviewed and updated as appropriate:   Past Medical History:  She has a past medical history of Anxiety, Scoliosis, and Skin cancer  ,  _______________________________________________________________________  Medical Problems:  does not have any pertinent problems on file ,  _______________________________________________________________________  Past Surgical History:   has a past surgical history that includes Joint replacement; Back surgery (1974); Colonoscopy (01/01/2013); Kidney stone surgery; and ELBOW FRACTURE REPAIR (Left, 8/11/2020)  ,  _______________________________________________________________________  Family History:  family history includes Colon cancer in her maternal uncle; Hypertension in her father; No Known Problems in her brother, daughter, maternal grandfather, maternal grandmother, paternal grandfather, paternal grandmother, and son; Skin cancer in her mother ,  _______________________________________________________________________  Social History:   reports that she has never smoked  She has never used smokeless tobacco  She reports current alcohol use  She reports current drug use  Drug: Marijuana  ,  _______________________________________________________________________  Allergies:  is allergic to macrobid [nitrofurantoin]     _______________________________________________________________________  Current Outpatient Medications   Medication Sig Dispense Refill    ALPRAZolam (XANAX) 0 25 mg tablet Take 1 tablet (0 25 mg total) by mouth 3 (three) times a day as needed for anxiety 60 tablet 0    cholecalciferol (VITAMIN D3) 1,000 units tablet Take 1,000 Units by mouth daily      oxybutynin (DITROPAN-XL) 10 MG 24 hr tablet TAKE 1 TABLET DAILY AT BEDTIME 90 tablet 3    DULoxetine (CYMBALTA) 30 mg delayed release capsule Take 1 capsule (30 mg total) by mouth daily 7 capsule 0    Mirabegron ER 25 MG TB24 Take 25 mg by mouth daily 30 tablet 3     No current facility-administered medications for this visit      _______________________________________________________________________  Review of Systems   Constitutional: Negative for activity change, appetite change, fatigue and unexpected weight change  Respiratory: Negative for cough, chest tightness and shortness of breath  Cardiovascular: Negative for chest pain and palpitations  Gastrointestinal: Negative for abdominal pain, constipation, diarrhea, nausea and vomiting  Genitourinary: Positive for urgency  Negative for difficulty urinating  Musculoskeletal: Positive for gait problem  Negative for arthralgias  Skin: Negative for rash  Neurological: Negative for dizziness and headaches  Psychiatric/Behavioral: Positive for dysphoric mood  Negative for decreased concentration, sleep disturbance and suicidal ideas  The patient is nervous/anxious  Objective:  Vitals:    09/16/21 1349   BP: 140/70   Pulse: 78   Temp: 97 8 °F (36 6 °C)   SpO2: 99%   Weight: 61 7 kg (136 lb)   Height: 5' (1 524 m)     Body mass index is 26 56 kg/m²  Physical Exam  Vitals and nursing note reviewed  Constitutional:       Appearance: Normal appearance  She is well-developed  Comments: Pt uses walker   HENT:      Head: Normocephalic and atraumatic  Cardiovascular:      Rate and Rhythm: Normal rate and regular rhythm  Heart sounds: Normal heart sounds  No murmur heard  Pulmonary:      Effort: Pulmonary effort is normal  No respiratory distress  Breath sounds: Normal breath sounds  No wheezing  Musculoskeletal:      Cervical back: Normal range of motion and neck supple  Right lower leg: No edema  Left lower leg: No edema  Comments: TTP R ant shoulder   Lymphadenopathy:      Cervical: No cervical adenopathy  Neurological:      Mental Status: She is alert and oriented to person, place, and time  Psychiatric:         Mood and Affect: Mood normal          Behavior: Behavior normal          Thought Content:  Thought content normal          Judgment: Judgment normal

## 2021-09-23 DIAGNOSIS — F41.9 ANXIETY: ICD-10-CM

## 2021-09-23 RX ORDER — ALPRAZOLAM 0.25 MG/1
0.25 TABLET ORAL 3 TIMES DAILY PRN
Qty: 60 TABLET | Refills: 0 | Status: SHIPPED | OUTPATIENT
Start: 2021-09-23 | End: 2021-11-01 | Stop reason: SDUPTHER

## 2021-10-14 ENCOUNTER — APPOINTMENT (OUTPATIENT)
Dept: LAB | Facility: AMBULARY SURGERY CENTER | Age: 66
End: 2021-10-14
Payer: COMMERCIAL

## 2021-10-14 DIAGNOSIS — Z13.6 SCREENING FOR CARDIOVASCULAR CONDITION: ICD-10-CM

## 2021-10-14 DIAGNOSIS — E55.9 VITAMIN D DEFICIENCY: ICD-10-CM

## 2021-10-14 DIAGNOSIS — R73.01 IMPAIRED FASTING GLUCOSE: ICD-10-CM

## 2021-10-14 LAB
25(OH)D3 SERPL-MCNC: 44.1 NG/ML (ref 30–100)
CHOLEST SERPL-MCNC: 184 MG/DL (ref 50–200)
EST. AVERAGE GLUCOSE BLD GHB EST-MCNC: 117 MG/DL
HBA1C MFR BLD: 5.7 %
HDLC SERPL-MCNC: 72 MG/DL
LDLC SERPL CALC-MCNC: 95 MG/DL (ref 0–100)
NONHDLC SERPL-MCNC: 112 MG/DL
TRIGL SERPL-MCNC: 83 MG/DL

## 2021-10-14 PROCEDURE — 82306 VITAMIN D 25 HYDROXY: CPT

## 2021-10-14 PROCEDURE — 36415 COLL VENOUS BLD VENIPUNCTURE: CPT

## 2021-10-14 PROCEDURE — 80061 LIPID PANEL: CPT

## 2021-10-14 PROCEDURE — 83036 HEMOGLOBIN GLYCOSYLATED A1C: CPT

## 2021-10-18 DIAGNOSIS — N39.41 URGE INCONTINENCE OF URINE: ICD-10-CM

## 2021-11-01 DIAGNOSIS — F41.9 ANXIETY: ICD-10-CM

## 2021-11-01 RX ORDER — ALPRAZOLAM 0.25 MG/1
0.25 TABLET ORAL 3 TIMES DAILY PRN
Qty: 60 TABLET | Refills: 0 | Status: SHIPPED | OUTPATIENT
Start: 2021-11-01 | End: 2021-12-03 | Stop reason: SDUPTHER

## 2021-11-04 ENCOUNTER — OFFICE VISIT (OUTPATIENT)
Dept: PHYSICAL THERAPY | Facility: CLINIC | Age: 66
End: 2021-11-04
Payer: COMMERCIAL

## 2021-11-04 DIAGNOSIS — R29.898 WEAKNESS OF RIGHT HIP: Primary | ICD-10-CM

## 2021-11-04 PROCEDURE — 97110 THERAPEUTIC EXERCISES: CPT | Performed by: PHYSICAL THERAPIST

## 2021-11-04 PROCEDURE — 97161 PT EVAL LOW COMPLEX 20 MIN: CPT | Performed by: PHYSICAL THERAPIST

## 2021-11-10 ENCOUNTER — OFFICE VISIT (OUTPATIENT)
Dept: PHYSICAL THERAPY | Facility: CLINIC | Age: 66
End: 2021-11-10
Payer: COMMERCIAL

## 2021-11-10 DIAGNOSIS — R29.898 WEAKNESS OF RIGHT HIP: Primary | ICD-10-CM

## 2021-11-10 PROCEDURE — 97140 MANUAL THERAPY 1/> REGIONS: CPT | Performed by: PHYSICAL THERAPIST

## 2021-11-10 PROCEDURE — 97110 THERAPEUTIC EXERCISES: CPT | Performed by: PHYSICAL THERAPIST

## 2021-11-11 ENCOUNTER — OFFICE VISIT (OUTPATIENT)
Dept: PHYSICAL THERAPY | Facility: CLINIC | Age: 66
End: 2021-11-11
Payer: COMMERCIAL

## 2021-11-11 DIAGNOSIS — R29.898 WEAKNESS OF RIGHT HIP: Primary | ICD-10-CM

## 2021-11-11 PROCEDURE — 97110 THERAPEUTIC EXERCISES: CPT | Performed by: PHYSICAL THERAPIST

## 2021-11-11 PROCEDURE — 97140 MANUAL THERAPY 1/> REGIONS: CPT | Performed by: PHYSICAL THERAPIST

## 2021-11-11 PROCEDURE — 97112 NEUROMUSCULAR REEDUCATION: CPT | Performed by: PHYSICAL THERAPIST

## 2021-11-15 ENCOUNTER — OFFICE VISIT (OUTPATIENT)
Dept: PHYSICAL THERAPY | Facility: CLINIC | Age: 66
End: 2021-11-15
Payer: COMMERCIAL

## 2021-11-15 DIAGNOSIS — R29.898 WEAKNESS OF RIGHT HIP: Primary | ICD-10-CM

## 2021-11-15 PROCEDURE — 97112 NEUROMUSCULAR REEDUCATION: CPT | Performed by: PHYSICAL THERAPIST

## 2021-11-15 PROCEDURE — 97110 THERAPEUTIC EXERCISES: CPT | Performed by: PHYSICAL THERAPIST

## 2021-11-18 ENCOUNTER — OFFICE VISIT (OUTPATIENT)
Dept: PHYSICAL THERAPY | Facility: CLINIC | Age: 66
End: 2021-11-18
Payer: COMMERCIAL

## 2021-11-18 DIAGNOSIS — R29.898 WEAKNESS OF RIGHT HIP: Primary | ICD-10-CM

## 2021-11-18 PROCEDURE — 97140 MANUAL THERAPY 1/> REGIONS: CPT | Performed by: PHYSICAL THERAPIST

## 2021-11-18 PROCEDURE — 97112 NEUROMUSCULAR REEDUCATION: CPT | Performed by: PHYSICAL THERAPIST

## 2021-11-18 PROCEDURE — 97110 THERAPEUTIC EXERCISES: CPT | Performed by: PHYSICAL THERAPIST

## 2021-11-22 ENCOUNTER — OFFICE VISIT (OUTPATIENT)
Dept: PHYSICAL THERAPY | Facility: CLINIC | Age: 66
End: 2021-11-22
Payer: COMMERCIAL

## 2021-11-22 DIAGNOSIS — R29.898 WEAKNESS OF RIGHT HIP: Primary | ICD-10-CM

## 2021-11-22 PROCEDURE — 97112 NEUROMUSCULAR REEDUCATION: CPT | Performed by: PHYSICAL THERAPIST

## 2021-11-22 PROCEDURE — 97110 THERAPEUTIC EXERCISES: CPT | Performed by: PHYSICAL THERAPIST

## 2021-11-22 PROCEDURE — 97140 MANUAL THERAPY 1/> REGIONS: CPT | Performed by: PHYSICAL THERAPIST

## 2021-11-24 ENCOUNTER — OFFICE VISIT (OUTPATIENT)
Dept: PHYSICAL THERAPY | Facility: CLINIC | Age: 66
End: 2021-11-24
Payer: COMMERCIAL

## 2021-11-24 DIAGNOSIS — R29.898 WEAKNESS OF RIGHT HIP: Primary | ICD-10-CM

## 2021-11-24 PROCEDURE — 97110 THERAPEUTIC EXERCISES: CPT | Performed by: PHYSICAL THERAPIST

## 2021-11-24 PROCEDURE — 97140 MANUAL THERAPY 1/> REGIONS: CPT | Performed by: PHYSICAL THERAPIST

## 2021-11-24 PROCEDURE — 97112 NEUROMUSCULAR REEDUCATION: CPT | Performed by: PHYSICAL THERAPIST

## 2021-11-29 ENCOUNTER — OFFICE VISIT (OUTPATIENT)
Dept: PHYSICAL THERAPY | Facility: CLINIC | Age: 66
End: 2021-11-29
Payer: COMMERCIAL

## 2021-11-29 ENCOUNTER — TELEPHONE (OUTPATIENT)
Dept: FAMILY MEDICINE CLINIC | Facility: CLINIC | Age: 66
End: 2021-11-29

## 2021-11-29 DIAGNOSIS — R29.898 WEAKNESS OF RIGHT HIP: Primary | ICD-10-CM

## 2021-11-29 PROCEDURE — 97110 THERAPEUTIC EXERCISES: CPT | Performed by: PHYSICAL THERAPIST

## 2021-11-29 PROCEDURE — 97140 MANUAL THERAPY 1/> REGIONS: CPT | Performed by: PHYSICAL THERAPIST

## 2021-11-29 PROCEDURE — 97112 NEUROMUSCULAR REEDUCATION: CPT | Performed by: PHYSICAL THERAPIST

## 2021-12-02 ENCOUNTER — OFFICE VISIT (OUTPATIENT)
Dept: PHYSICAL THERAPY | Facility: CLINIC | Age: 66
End: 2021-12-02
Payer: COMMERCIAL

## 2021-12-02 DIAGNOSIS — R29.898 WEAKNESS OF RIGHT HIP: Primary | ICD-10-CM

## 2021-12-02 PROCEDURE — 97140 MANUAL THERAPY 1/> REGIONS: CPT | Performed by: PHYSICAL THERAPIST

## 2021-12-02 PROCEDURE — 97110 THERAPEUTIC EXERCISES: CPT | Performed by: PHYSICAL THERAPIST

## 2021-12-02 PROCEDURE — 97530 THERAPEUTIC ACTIVITIES: CPT | Performed by: PHYSICAL THERAPIST

## 2021-12-03 DIAGNOSIS — F41.9 ANXIETY: ICD-10-CM

## 2021-12-03 RX ORDER — ALPRAZOLAM 0.25 MG/1
0.25 TABLET ORAL 3 TIMES DAILY PRN
Qty: 60 TABLET | Refills: 0 | Status: SHIPPED | OUTPATIENT
Start: 2021-12-03 | End: 2022-01-04 | Stop reason: SDUPTHER

## 2021-12-09 ENCOUNTER — CLINICAL SUPPORT (OUTPATIENT)
Dept: FAMILY MEDICINE CLINIC | Facility: CLINIC | Age: 66
End: 2021-12-09
Payer: COMMERCIAL

## 2021-12-09 DIAGNOSIS — M81.0 AGE RELATED OSTEOPOROSIS, UNSPECIFIED PATHOLOGICAL FRACTURE PRESENCE: Primary | ICD-10-CM

## 2021-12-09 PROCEDURE — 96372 THER/PROPH/DIAG INJ SC/IM: CPT

## 2022-01-04 DIAGNOSIS — F41.9 ANXIETY: ICD-10-CM

## 2022-01-04 RX ORDER — ALPRAZOLAM 0.25 MG/1
0.25 TABLET ORAL 3 TIMES DAILY PRN
Qty: 60 TABLET | Refills: 0 | Status: SHIPPED | OUTPATIENT
Start: 2022-01-04 | End: 2022-02-05 | Stop reason: SDUPTHER

## 2022-01-10 ENCOUNTER — TELEPHONE (OUTPATIENT)
Dept: FAMILY MEDICINE CLINIC | Facility: CLINIC | Age: 67
End: 2022-01-10

## 2022-01-19 RX ORDER — DULOXETIN HYDROCHLORIDE 60 MG/1
60 CAPSULE, DELAYED RELEASE ORAL DAILY
COMMUNITY
Start: 2021-12-05 | End: 2022-05-23 | Stop reason: SDUPTHER

## 2022-01-20 ENCOUNTER — OFFICE VISIT (OUTPATIENT)
Dept: FAMILY MEDICINE CLINIC | Facility: CLINIC | Age: 67
End: 2022-01-20
Payer: COMMERCIAL

## 2022-01-20 VITALS
RESPIRATION RATE: 16 BRPM | TEMPERATURE: 97.8 F | HEIGHT: 60 IN | HEART RATE: 80 BPM | OXYGEN SATURATION: 98 % | SYSTOLIC BLOOD PRESSURE: 138 MMHG | DIASTOLIC BLOOD PRESSURE: 86 MMHG | BODY MASS INDEX: 26.11 KG/M2 | WEIGHT: 133 LBS

## 2022-01-20 DIAGNOSIS — R73.01 IMPAIRED FASTING GLUCOSE: ICD-10-CM

## 2022-01-20 DIAGNOSIS — N39.41 URGE INCONTINENCE OF URINE: ICD-10-CM

## 2022-01-20 DIAGNOSIS — F41.8 MIXED ANXIETY AND DEPRESSIVE DISORDER: Primary | ICD-10-CM

## 2022-01-20 DIAGNOSIS — M81.0 AGE RELATED OSTEOPOROSIS, UNSPECIFIED PATHOLOGICAL FRACTURE PRESENCE: ICD-10-CM

## 2022-01-20 DIAGNOSIS — Z23 ENCOUNTER FOR IMMUNIZATION: ICD-10-CM

## 2022-01-20 DIAGNOSIS — R29.898 WEAKNESS OF RIGHT LOWER EXTREMITY: ICD-10-CM

## 2022-01-20 DIAGNOSIS — G89.29 CHRONIC RIGHT SHOULDER PAIN: ICD-10-CM

## 2022-01-20 DIAGNOSIS — M25.511 CHRONIC RIGHT SHOULDER PAIN: ICD-10-CM

## 2022-01-20 DIAGNOSIS — E55.9 VITAMIN D DEFICIENCY: ICD-10-CM

## 2022-01-20 PROBLEM — Z00.00 MEDICARE ANNUAL WELLNESS VISIT, SUBSEQUENT: Status: ACTIVE | Noted: 2022-01-20

## 2022-01-20 PROCEDURE — 90471 IMMUNIZATION ADMIN: CPT | Performed by: FAMILY MEDICINE

## 2022-01-20 PROCEDURE — 90732 PPSV23 VACC 2 YRS+ SUBQ/IM: CPT | Performed by: FAMILY MEDICINE

## 2022-01-20 PROCEDURE — 99215 OFFICE O/P EST HI 40 MIN: CPT | Performed by: FAMILY MEDICINE

## 2022-01-20 PROCEDURE — 1036F TOBACCO NON-USER: CPT | Performed by: FAMILY MEDICINE

## 2022-01-20 PROCEDURE — 4040F PNEUMOC VAC/ADMIN/RCVD: CPT | Performed by: FAMILY MEDICINE

## 2022-01-20 PROCEDURE — 1160F RVW MEDS BY RX/DR IN RCRD: CPT | Performed by: FAMILY MEDICINE

## 2022-01-20 PROCEDURE — 3008F BODY MASS INDEX DOCD: CPT | Performed by: FAMILY MEDICINE

## 2022-01-20 NOTE — ASSESSMENT & PLAN NOTE
Last dexascan was in Aug 2021 and has slight decrease  Was on Fosamax in the past and had side-effects  Pt received first Prolia shot on 12/9/21 and will get them every 6 mths  Pt advised to take calcium 1200 mg qd and Vit D 1000 U qd  Pt also advised to perform weight-bearing exercise on a regular basis

## 2022-01-20 NOTE — PATIENT INSTRUCTIONS
Fall Prevention   WHAT YOU NEED TO KNOW:   Fall prevention includes ways to make your home and other areas safer  It also includes ways you can move more carefully to prevent a fall  Health conditions that cause changes in your blood pressure, vision, or muscle strength and coordination may increase your risk for falls  Medicines may also increase your risk for falls if they make you dizzy, weak, or sleepy  DISCHARGE INSTRUCTIONS:   Call 911 or have someone else call if:   · You have fallen and are unconscious  · You have fallen and cannot move part of your body  Contact your healthcare provider if:   · You have fallen and have pain or a headache  · You have questions or concerns about your condition or care  Fall prevention tips:   · Stand or sit up slowly  This may help you keep your balance and prevent falls  · Use assistive devices as directed  Your healthcare provider may suggest that you use a cane or walker to help you keep your balance  You may need to have grab bars put in your bathroom near the toilet or in the shower  · Wear shoes that fit well and have soles that   Wear shoes both inside and outside  Use slippers with good   Do not wear shoes with high heels  · Wear a personal alarm  This is a device that allows you to call 911 if you fall and need help  Ask your healthcare provider for more information  · Stay active  Exercise can help strengthen your muscles and improve your balance  Your healthcare provider may recommend water aerobics or walking  He or she may also recommend physical therapy to improve your coordination  Never start an exercise program without talking to your healthcare provider first          · Manage your medical conditions  Keep all appointments with your healthcare providers  Visit your eye doctor as directed  Home safety tips:       · Add items to prevent falls in the bathroom    Put nonslip strips on your bath or shower floor to prevent you from slipping  Use a bath mat if you do not have carpet in the bathroom  This will prevent you from falling when you step out of the bath or shower  Use a shower seat so you do not need to stand while you shower  Sit on the toilet or a chair in your bathroom to dry yourself and put on clothing  This will prevent you from losing your balance from drying or dressing yourself while you are standing  · Keep paths clear  Remove books, shoes, and other objects from walkways and stairs  Place cords for telephones and lamps out of the way so that you do not need to walk over them  Tape them down if you cannot move them  Remove small rugs  If you cannot remove a rug, secure it with double-sided tape  This will prevent you from tripping  · Install bright lights in your home  Use night lights to help light paths to the bathroom or kitchen  Always turn on the light before you start walking  · Keep items you use often on shelves within reach  Do not use a step stool to help you reach an item  · Paint or place reflective tape on the edges of your stairs  This will help you see the stairs better  Follow up with your doctor as directed:  Write down your questions so you remember to ask them during your visits  © Copyright [address] 2021 Information is for End User's use only and may not be sold, redistributed or otherwise used for commercial purposes  All illustrations and images included in CareNotes® are the copyrighted property of [employer]  or [employer]  The above information is an  only  It is not intended as medical advice for individual conditions or treatments  Talk to your doctor, nurse or pharmacist before following any medical regimen to see if it is safe and effective for you

## 2022-01-20 NOTE — PROGRESS NOTES
BMI Counseling: Body mass index is 25 97 kg/m²  The BMI is above normal  Nutrition recommendations include decreasing portion sizes, encouraging healthy choices of fruits and vegetables, consuming healthier snacks and moderation in carbohydrate intake  Exercise recommendations include exercising 3-5 times per week  No pharmacotherapy was ordered  Rationale for BMI follow-up plan is due to patient being overweight or obese  Falls Plan of Care: balance, strength, and gait training instructions were provided  Assessment/Plan:         Problem List Items Addressed This Visit        Endocrine    Impaired fasting glucose     A1C 5 7 in Oct 2021  Advised pt to follow a low carb diet and to exercise on a regular basis  Nervous and Auditory    Weakness of right lower extremity     Has been getting worse  Uses walker to ambulate  Has had more weakness in R leg  Will send for PT  Relevant Orders    Ambulatory Referral to Physical Therapy       Musculoskeletal and Integument    Age related osteoporosis     Last dexascan was in Aug 2021 and has slight decrease  Was on Fosamax in the past and had side-effects  Pt received first Prolia shot on 12/9/21 and will get them every 6 mths  Pt advised to take calcium 1200 mg qd and Vit D 1000 U qd  Pt also advised to perform weight-bearing exercise on a regular basis  Other    Vitamin D deficiency     Level 44 1 in Oct 2021  Continue 1000 U qd  Urge incontinence of urine     No change  Will try tolterodine 1 mg again due to $           Mixed anxiety and depressive disorder - Primary     Continue cymbalta 60 mg qd and alprazolam 0 25 mg as needed  Relevant Medications    DULoxetine (CYMBALTA) 60 mg delayed release capsule    Other Relevant Orders    Ambulatory Referral to Psychiatry    Chronic right shoulder pain     Pt saw Ortho Dr Kristine Ramos in the past and had injection but no better  Will refer for PT            Relevant Orders Ambulatory Referral to Physical Therapy      Other Visit Diagnoses     Encounter for immunization        Relevant Orders    PNEUMOCOCCAL POLYSACCHARIDE VACCINE 23-VALENT =>1YO SQ IM            Subjective:      Patient ID: Leida Shone is a 77 y o  female  Pt here for f/u Anxiety/Depressiom, Osteoporosis, Urge Incontinence, Vit D def, Impaired Fasting Glucose  Mood has still been depressed and feels anxious  Pt still takes xanax bid  Not much change on cymbalta  Not crying but feels like she doesn't want to do anything  Still has weakness of legs and R shoulder pain  Wants to go to PT  Pt had 1st prolia shot in Dec 2021  Taking Ca/Vit D  The following portions of the patient's history were reviewed and updated as appropriate:   Past Medical History:  She has a past medical history of Anxiety, Scoliosis, and Skin cancer  ,  _______________________________________________________________________  Medical Problems:  does not have any pertinent problems on file ,  _______________________________________________________________________  Past Surgical History:   has a past surgical history that includes Joint replacement; Back surgery (1974); Colonoscopy (01/01/2013); Kidney stone surgery; and ELBOW FRACTURE REPAIR (Left, 8/11/2020)  ,  _______________________________________________________________________  Family History:  family history includes Colon cancer in her maternal uncle; Hypertension in her father; No Known Problems in her brother, daughter, maternal grandfather, maternal grandmother, paternal grandfather, paternal grandmother, and son; Skin cancer in her mother ,  _______________________________________________________________________  Social History:   reports that she has never smoked  She has never used smokeless tobacco  She reports current alcohol use  She reports current drug use  Drug: Marijuana  ,  _______________________________________________________________________  Allergies:  is allergic to macrobid [nitrofurantoin]     _______________________________________________________________________  Current Outpatient Medications   Medication Sig Dispense Refill    ALPRAZolam (XANAX) 0 25 mg tablet Take 1 tablet (0 25 mg total) by mouth 3 (three) times a day as needed for anxiety 60 tablet 0    cholecalciferol (VITAMIN D3) 1,000 units tablet Take 1,000 Units by mouth daily      DULoxetine (CYMBALTA) 30 mg delayed release capsule Take 1 capsule (30 mg total) by mouth daily 7 capsule 0    DULoxetine (CYMBALTA) 60 mg delayed release capsule       Mirabegron ER 25 MG TB24 Take 25 mg by mouth daily 90 tablet 1    oxybutynin (DITROPAN-XL) 10 MG 24 hr tablet TAKE 1 TABLET DAILY AT BEDTIME (Patient not taking: Reported on 1/20/2022) 90 tablet 3     No current facility-administered medications for this visit      _______________________________________________________________________  Review of Systems   Constitutional: Negative for activity change, appetite change, fatigue and unexpected weight change  Respiratory: Negative for cough, chest tightness and shortness of breath  Cardiovascular: Negative for chest pain and palpitations  Gastrointestinal: Negative for abdominal pain, constipation, diarrhea, nausea and vomiting  Genitourinary: Positive for urgency  Negative for difficulty urinating  Musculoskeletal: Positive for arthralgias and gait problem  Skin: Negative for rash  Neurological: Negative for dizziness and headaches  Psychiatric/Behavioral: Positive for dysphoric mood  Negative for decreased concentration, sleep disturbance and suicidal ideas  The patient is nervous/anxious  Objective:  Vitals:    01/20/22 1331   BP: 138/86   BP Location: Left arm   Patient Position: Sitting   Pulse: 80   Resp: 16   Temp: 97 8 °F (36 6 °C)   SpO2: 98%   Weight: 60 3 kg (133 lb)   Height: 5' (1 524 m)     Body mass index is 25 97 kg/m²       Physical Exam  Vitals and nursing note reviewed  Constitutional:       Appearance: Normal appearance  She is well-developed  Comments: Pt uses walker   HENT:      Head: Normocephalic and atraumatic  Cardiovascular:      Rate and Rhythm: Normal rate and regular rhythm  Heart sounds: Normal heart sounds  No murmur heard  Pulmonary:      Effort: Pulmonary effort is normal  No respiratory distress  Breath sounds: Normal breath sounds  No wheezing  Musculoskeletal:      Cervical back: Normal range of motion and neck supple  Right lower leg: No edema  Left lower leg: No edema  Comments: TTP R anterior shoulder   Lymphadenopathy:      Cervical: No cervical adenopathy  Neurological:      Mental Status: She is alert and oriented to person, place, and time  Psychiatric:         Mood and Affect: Mood normal          Behavior: Behavior normal          Thought Content:  Thought content normal          Judgment: Judgment normal

## 2022-01-20 NOTE — ASSESSMENT & PLAN NOTE
Wellness exam done  Had Tdap in 2016  Had flu shot, COVID vaccines/booster, and Shingrix  Pneumovacc 23 given  Labs are UTD  Had mammogram and dexascan in Aug 2021  Had colonoscopy in 2019  No recent falls  Mood ok on med

## 2022-02-05 DIAGNOSIS — F41.9 ANXIETY: ICD-10-CM

## 2022-02-07 RX ORDER — ALPRAZOLAM 0.25 MG/1
0.25 TABLET ORAL 3 TIMES DAILY PRN
Qty: 60 TABLET | Refills: 0 | Status: SHIPPED | OUTPATIENT
Start: 2022-02-07 | End: 2022-03-17 | Stop reason: SDUPTHER

## 2022-02-18 DIAGNOSIS — N39.41 URGE INCONTINENCE OF URINE: Primary | ICD-10-CM

## 2022-02-18 RX ORDER — TOLTERODINE TARTRATE 1 MG/1
1 TABLET, EXTENDED RELEASE ORAL 2 TIMES DAILY
Qty: 180 TABLET | Refills: 2 | Status: SHIPPED | OUTPATIENT
Start: 2022-02-18

## 2022-02-23 ENCOUNTER — TELEPHONE (OUTPATIENT)
Dept: PSYCHIATRY | Facility: CLINIC | Age: 67
End: 2022-02-23

## 2022-03-17 DIAGNOSIS — F41.9 ANXIETY: ICD-10-CM

## 2022-03-17 RX ORDER — ALPRAZOLAM 0.25 MG/1
0.25 TABLET ORAL 3 TIMES DAILY PRN
Qty: 60 TABLET | Refills: 0 | Status: SHIPPED | OUTPATIENT
Start: 2022-03-17 | End: 2022-04-04 | Stop reason: SDUPTHER

## 2022-04-04 DIAGNOSIS — F41.9 ANXIETY: ICD-10-CM

## 2022-04-04 RX ORDER — ALPRAZOLAM 0.25 MG/1
0.25 TABLET ORAL 3 TIMES DAILY PRN
Qty: 60 TABLET | Refills: 0 | Status: SHIPPED | OUTPATIENT
Start: 2022-04-04 | End: 2022-05-11 | Stop reason: SDUPTHER

## 2022-05-11 DIAGNOSIS — F41.9 ANXIETY: ICD-10-CM

## 2022-05-11 RX ORDER — ALPRAZOLAM 0.25 MG/1
0.25 TABLET ORAL 3 TIMES DAILY PRN
Qty: 60 TABLET | Refills: 0 | Status: SHIPPED | OUTPATIENT
Start: 2022-05-11 | End: 2022-06-17 | Stop reason: SDUPTHER

## 2022-05-23 ENCOUNTER — OFFICE VISIT (OUTPATIENT)
Dept: FAMILY MEDICINE CLINIC | Facility: CLINIC | Age: 67
End: 2022-05-23
Payer: COMMERCIAL

## 2022-05-23 VITALS
SYSTOLIC BLOOD PRESSURE: 122 MMHG | WEIGHT: 138 LBS | DIASTOLIC BLOOD PRESSURE: 70 MMHG | OXYGEN SATURATION: 96 % | TEMPERATURE: 98.6 F | HEIGHT: 60 IN | RESPIRATION RATE: 16 BRPM | HEART RATE: 92 BPM | BODY MASS INDEX: 27.09 KG/M2

## 2022-05-23 DIAGNOSIS — E55.9 VITAMIN D DEFICIENCY: ICD-10-CM

## 2022-05-23 DIAGNOSIS — N39.41 URGE INCONTINENCE OF URINE: ICD-10-CM

## 2022-05-23 DIAGNOSIS — M81.0 AGE RELATED OSTEOPOROSIS, UNSPECIFIED PATHOLOGICAL FRACTURE PRESENCE: ICD-10-CM

## 2022-05-23 DIAGNOSIS — R73.01 IMPAIRED FASTING GLUCOSE: ICD-10-CM

## 2022-05-23 DIAGNOSIS — F41.8 MIXED ANXIETY AND DEPRESSIVE DISORDER: Primary | ICD-10-CM

## 2022-05-23 PROCEDURE — 3008F BODY MASS INDEX DOCD: CPT | Performed by: FAMILY MEDICINE

## 2022-05-23 PROCEDURE — 99214 OFFICE O/P EST MOD 30 MIN: CPT | Performed by: FAMILY MEDICINE

## 2022-05-23 PROCEDURE — 1036F TOBACCO NON-USER: CPT | Performed by: FAMILY MEDICINE

## 2022-05-23 PROCEDURE — 1160F RVW MEDS BY RX/DR IN RCRD: CPT | Performed by: FAMILY MEDICINE

## 2022-05-23 RX ORDER — DULOXETIN HYDROCHLORIDE 60 MG/1
60 CAPSULE, DELAYED RELEASE ORAL DAILY
Qty: 90 CAPSULE | Refills: 3 | Status: SHIPPED | OUTPATIENT
Start: 2022-05-23

## 2022-05-23 RX ORDER — LAMOTRIGINE 25 MG/1
25 TABLET ORAL DAILY
Qty: 30 TABLET | Refills: 3 | Status: SHIPPED | OUTPATIENT
Start: 2022-05-23 | End: 2022-06-09 | Stop reason: SDUPTHER

## 2022-05-23 NOTE — ASSESSMENT & PLAN NOTE
Last dexascan was in Aug 2021 and had slight decrease  Was on Fosamax in the past and had side-effects  Pt received first Prolia shot on 12/9/21 and will get them every 6 mths  Pt advised to take calcium 1200 mg qd and Vit D 1000 U qd  Pt also advised to perform weight-bearing exercise on a regular basis  Recheck dexascan in Aug 2023

## 2022-05-23 NOTE — PROGRESS NOTES
Assessment/Plan:         Problem List Items Addressed This Visit        Endocrine    Impaired fasting glucose     Recheck A1C  Advised pt to follow a low carb diet and to exercise on a regular basis  Relevant Orders    Hemoglobin A1C       Musculoskeletal and Integument    Age related osteoporosis     Last dexascan was in Aug 2021 and had slight decrease  Was on Fosamax in the past and had side-effects  Pt received first Prolia shot on 12/9/21 and will get them every 6 mths  Pt advised to take calcium 1200 mg qd and Vit D 1000 U qd  Pt also advised to perform weight-bearing exercise on a regular basis  Recheck dexascan in Aug 2023  Other    Vitamin D deficiency     Recheck level  Continue 1000 U qd  Relevant Orders    Vitamin D 25 hydroxy    Urge incontinence of urine     Doing better  Continue tolterodine 1 mg bid           Mixed anxiety and depressive disorder - Primary     Pt still feels depressed  Will add lamictal 25 mg qd  Continue cymbalta 60 mg qd and alprazolam 0 25 mg as needed  Relevant Medications    lamoTRIgine (LaMICtal) 25 mg tablet    DULoxetine (CYMBALTA) 60 mg delayed release capsule            Subjective:      Patient ID: Dalia Walsh is a 79 y o  female  Pt here for f/u anxiety/depression, osteoporosis, Vit D def  Pt doing ok  Still feels depressed and worse in AM  Taking cymbalta 60 mg qd and xanax bid  Getting PT 2 times a week and still has to walk with walker  The following portions of the patient's history were reviewed and updated as appropriate:   Past Medical History:  She has a past medical history of Anxiety, Scoliosis, and Skin cancer  ,  _______________________________________________________________________  Medical Problems:  does not have any pertinent problems on file ,  _______________________________________________________________________  Past Surgical History:   has a past surgical history that includes Joint replacement; Back surgery (1974); Colonoscopy (01/01/2013); Kidney stone surgery; and ELBOW FRACTURE REPAIR (Left, 8/11/2020)  ,  _______________________________________________________________________  Family History:  family history includes Colon cancer in her maternal uncle; Hypertension in her father; No Known Problems in her brother, daughter, maternal grandfather, maternal grandmother, paternal grandfather, paternal grandmother, and son; Skin cancer in her mother ,  _______________________________________________________________________  Social History:   reports that she has never smoked  She has never used smokeless tobacco  She reports current alcohol use  She reports current drug use  Drug: Marijuana  ,  _______________________________________________________________________  Allergies:  is allergic to macrobid [nitrofurantoin]     _______________________________________________________________________  Current Outpatient Medications   Medication Sig Dispense Refill    ALPRAZolam (XANAX) 0 25 mg tablet Take 1 tablet (0 25 mg total) by mouth as needed in the morning and 1 tablet (0 25 mg total) as needed at noon and 1 tablet (0 25 mg total) as needed in the evening for anxiety  60 tablet 0    cholecalciferol (VITAMIN D3) 1,000 units tablet Take 1,000 Units by mouth daily      DULoxetine (CYMBALTA) 60 mg delayed release capsule Take 1 capsule (60 mg total) by mouth in the morning  90 capsule 3    lamoTRIgine (LaMICtal) 25 mg tablet Take 1 tablet (25 mg total) by mouth in the morning  30 tablet 3    tolterodine (DETROL) 1 mg tablet Take 1 tablet (1 mg total) by mouth 2 (two) times a day 180 tablet 2     No current facility-administered medications for this visit      _______________________________________________________________________  Review of Systems   Constitutional: Negative for activity change, appetite change, fatigue and unexpected weight change     Respiratory: Negative for cough, chest tightness and shortness of breath  Cardiovascular: Negative for chest pain and palpitations  Gastrointestinal: Negative for abdominal pain, constipation, diarrhea, nausea and vomiting  Genitourinary: Positive for urgency  Negative for difficulty urinating  Musculoskeletal: Positive for arthralgias and gait problem  Skin: Negative for rash  Neurological: Negative for dizziness and headaches  Psychiatric/Behavioral: Positive for dysphoric mood  Negative for decreased concentration, sleep disturbance and suicidal ideas  The patient is nervous/anxious  Objective:  Vitals:    05/23/22 1305   BP: 122/70   BP Location: Left arm   Patient Position: Sitting   Cuff Size: Standard   Pulse: 92   Resp: 16   Temp: 98 6 °F (37 °C)   TempSrc: Temporal   SpO2: 96%   Weight: 62 6 kg (138 lb)   Height: 5' (1 524 m)     Body mass index is 26 95 kg/m²  Physical Exam  Vitals and nursing note reviewed  Constitutional:       Appearance: Normal appearance  She is well-developed  Comments: Pt uses walker   HENT:      Head: Normocephalic and atraumatic  Cardiovascular:      Rate and Rhythm: Normal rate and regular rhythm  Heart sounds: Normal heart sounds  No murmur heard  Pulmonary:      Effort: Pulmonary effort is normal  No respiratory distress  Breath sounds: Normal breath sounds  No wheezing  Musculoskeletal:      Cervical back: Normal range of motion and neck supple  Right lower leg: No edema  Left lower leg: No edema  Comments: TTP R anterior shoulder   Lymphadenopathy:      Cervical: No cervical adenopathy  Neurological:      Mental Status: She is alert and oriented to person, place, and time  Psychiatric:         Mood and Affect: Mood normal          Behavior: Behavior normal          Thought Content:  Thought content normal          Judgment: Judgment normal

## 2022-05-23 NOTE — ASSESSMENT & PLAN NOTE
Pt still feels depressed  Will add lamictal 25 mg qd  Continue cymbalta 60 mg qd and alprazolam 0 25 mg as needed

## 2022-06-09 DIAGNOSIS — F41.8 MIXED ANXIETY AND DEPRESSIVE DISORDER: ICD-10-CM

## 2022-06-09 RX ORDER — LAMOTRIGINE 25 MG/1
25 TABLET ORAL DAILY
Qty: 90 TABLET | Refills: 3 | Status: SHIPPED | OUTPATIENT
Start: 2022-06-09

## 2022-06-16 ENCOUNTER — CLINICAL SUPPORT (OUTPATIENT)
Dept: FAMILY MEDICINE CLINIC | Facility: CLINIC | Age: 67
End: 2022-06-16
Payer: MEDICARE

## 2022-06-16 DIAGNOSIS — M81.0 AGE RELATED OSTEOPOROSIS, UNSPECIFIED PATHOLOGICAL FRACTURE PRESENCE: Primary | ICD-10-CM

## 2022-06-16 PROCEDURE — 96372 THER/PROPH/DIAG INJ SC/IM: CPT

## 2022-06-17 DIAGNOSIS — F41.9 ANXIETY: ICD-10-CM

## 2022-06-17 RX ORDER — ALPRAZOLAM 0.25 MG/1
0.25 TABLET ORAL 3 TIMES DAILY PRN
Qty: 60 TABLET | Refills: 0 | Status: SHIPPED | OUTPATIENT
Start: 2022-06-17 | End: 2022-07-21 | Stop reason: SDUPTHER

## 2022-07-21 DIAGNOSIS — F41.9 ANXIETY: ICD-10-CM

## 2022-07-21 RX ORDER — ALPRAZOLAM 0.25 MG/1
0.25 TABLET ORAL 3 TIMES DAILY PRN
Qty: 60 TABLET | Refills: 0 | Status: SHIPPED | OUTPATIENT
Start: 2022-07-21 | End: 2022-08-26 | Stop reason: SDUPTHER

## 2022-08-08 ENCOUNTER — ANNUAL EXAM (OUTPATIENT)
Dept: OBGYN CLINIC | Facility: CLINIC | Age: 67
End: 2022-08-08
Payer: MEDICARE

## 2022-08-08 VITALS
BODY MASS INDEX: 26.9 KG/M2 | SYSTOLIC BLOOD PRESSURE: 132 MMHG | DIASTOLIC BLOOD PRESSURE: 80 MMHG | HEIGHT: 60 IN | WEIGHT: 137 LBS

## 2022-08-08 DIAGNOSIS — Z12.31 ENCOUNTER FOR SCREENING MAMMOGRAM FOR MALIGNANT NEOPLASM OF BREAST: ICD-10-CM

## 2022-08-08 DIAGNOSIS — Z01.419 ENCOUNTER FOR ROUTINE GYNECOLOGIC EXAMINATION IN MEDICARE PATIENT: Primary | ICD-10-CM

## 2022-08-08 DIAGNOSIS — Z12.4 ENCOUNTER FOR PAPANICOLAOU SMEAR FOR CERVICAL CANCER SCREENING: ICD-10-CM

## 2022-08-08 PROCEDURE — G0145 SCR C/V CYTO,THINLAYER,RESCR: HCPCS | Performed by: PHYSICIAN ASSISTANT

## 2022-08-08 PROCEDURE — G0476 HPV COMBO ASSAY CA SCREEN: HCPCS | Performed by: PHYSICIAN ASSISTANT

## 2022-08-08 PROCEDURE — G0101 CA SCREEN;PELVIC/BREAST EXAM: HCPCS | Performed by: PHYSICIAN ASSISTANT

## 2022-08-08 NOTE — PROGRESS NOTES
ASSESSMENT & PLAN: Betty Evans is a 79 y o  [id number] with normal gynecologic exam     1   Routine well woman exam done today  2  Pap and HPV:  The patient's last pap and hpv was unknown  Pap and cotesting was done today  Current ASCCP Guidelines reviewed  No PAP screening on file in [facility], will have MA look in [facility] records for last  Unsure if hx of abnormal PAP past 10-20 yrs  PAP recommended today and pt agreeable, consider last PAP if normal and no recent history of abnormal PAP in old records  3   Mammogram ordered  4  Colonoscopy UTD, due again 2026  5  DEXA  - due 8/2023, continue prolia through PCP q6mos  6  The following were reviewed in today's visit: breast self exam, mammography screening ordered, adequate intake of calcium and vitamin D, exercise, healthy diet and cancer screening recommendations  7  She will continue routine visits for her chronic conditions w/ PCP  RTO in 2 years for medicare annual in this low risk patient  If needed sooner secondary to abnormal PAP, or is any new problem arising in interim, will schedule sooner appt  Pt agreeable to plan and satisfied  CC:  Annual Gynecologic Examination    HPI: Betty Evans is a 79 y o  [id number] who presents for annual gynecologic examination  She has the following concerns:  None  Doing well and has no GYN concerns  She is under tx w/ PCP for MH, depression  Taking Cymbalta and lamictal, xanax prn  She has chronic pain and orthopaedic conditions, gait dysfunction and uses rolling walker  She is taking detrol LA for urinary incontinence  She is taking  vit D for osteoporosis and vit D def  Not taking calcium seconary to hx renal stone  Prolia tx 12/2021 and 6/2022 for the osteoporosis  Healthy diet Yes  Vitamins Yes - vit D  Exercise - none, was doing PT    No LMP recorded   Patient is postmenopausal     Post-menopausal bleeding: none        Health Maintenance:      She does perform regular monthly self breast exams  No breast concerns  She feels safe at home  Last PAP & HPV: dr Violet Bob about 3 years ago  Denies known abnormal PAP  Last mammogram: 2021 - normal    Last colonoscopy: 2019  Last DEXA: 2021; lumbar spine -0 9, total hip -2 7, femoral neck 0 1, forearm -0 1  Past Medical History:   Diagnosis Date    Anxiety     Scoliosis     Skin cancer        Past Surgical History:   Procedure Laterality Date    BACK SURGERY      velarde damon in back for scoliosis    COLONOSCOPY  2013    ELBOW FRACTURE REPAIR Left 2020    Procedure: ORIF left humeral shaft, ulnar nerve release, tenotomy anconeus epitrochlearis, radial nerve dissection;  Surgeon: Deborrah Hodgkins, MD;  Location: [facility];  Service: Orthopedics    JOINT REPLACEMENT      left hip and knee    KIDNEY STONE SURGERY         Past OB/Gyn History:  OB History        2    Para   2    Term   2            AB        Living   2       SAB        IAB        Ectopic        Multiple        Live Births                   History of abnormal pap smears: unsure  Patient is not currently sexually active       Family History   Problem Relation Age of Onset    Skin cancer Mother     Hypertension Father     Colon cancer Maternal Uncle         unknown age-maybe n his 62s    No Known Problems Daughter     No Known Problems Maternal Grandmother     No Known Problems Maternal Grandfather     No Known Problems Paternal Grandmother     No Known Problems Paternal Grandfather     No Known Problems Son     No Known Problems Brother        Family History of Breast/Uterine/Ovarian/Colon:  Colon CA uncle    Social History:  Social History     Socioeconomic History    Marital status: /Civil Union     Spouse name: Not on file    Number of children: Not on file    Years of education: Not on file    Highest education level: Not on file   Occupational History    Not on file   Tobacco Use    Smoking status: Never Smoker    Smokeless tobacco: Never Used   Vaping Use    Vaping Use: Never used   Substance and Sexual Activity    Alcohol use: Yes    Drug use: Yes     Types: Marijuana     Comment: Medical Marijuana    Sexual activity: Not Currently     Partners: Male   Other Topics Concern    Not on file   Social History Narrative    Abnormal MMG:   No      Abnormal Pap:   No       Date of last pap smear:         PAP/HPV-Neg     Do you have a discharge:   No      Menses monthly:   No      Age at first child:   27      Age at menarche:   25      Current birth control method:   Menopause      History of Abnormal Pap Smear:   No      STD/PID:   No      If post menopausal, age at menopause:   46      Abnormal Pap:   No      Sexually Active:   Yes      Sexual Partners:   Male      Date of Last Colonoscopy:       due in 2019     History of ovarian cysts:   No      Date of last mammogram:   2018    Total:   2      Full term:   2      Livin     1 boy, 1 girl     Tobacco smoking status:   Never smoker      Smoking - how much:   None     Most recent tobacco use screenin2019      Do you currently or have you served in the [employer]:   No      Were you activated, into active duty, as a member of the [employer] or as a Reservist:   No      Occupation:   retired      Education:   15      some college     Marital status:         Sexual orientation:   Heterosexual      Exercise level:   Occasional      Diet:   Regular       General stress level:   Medium      Alcohol intake:   Occasional       Caffeine intake:   Occasional      Illicit drugs:   denies      Seat belts used routinely:   Yes      Sunscreen used routinely:   Yes      Live alone or with others:   with others      History of Physical, Emotional or Sexual Abuse:   No      Presence of domestic violence:   No      Sexually active:   Yes       Do you feel safe at home:    Yes                   Social Determinants of Health     Financial Resource Strain: Not on file   Food Insecurity: Not on file   Transportation Needs: Not on file   Physical Activity: Not on file   Stress: Not on file   Social Connections: Not on file   Intimate Partner Violence: Not on file   Housing Stability: Not on file       Allergies   Allergen Reactions    Macrobid [Nitrofurantoin] Other (See Comments)     Pt does not remember what reaction was         Current Outpatient Medications:     ALPRAZolam (XANAX) 0 25 mg tablet, Take 1 tablet (0 25 mg total) by mouth 3 (three) times a day as needed for anxiety, Disp: 60 tablet, Rfl: 0    cholecalciferol (VITAMIN D3) 1,000 units tablet, Take 1,000 Units by mouth daily, Disp: , Rfl:     DULoxetine (CYMBALTA) 60 mg delayed release capsule, Take 1 capsule (60 mg total) by mouth in the morning , Disp: 90 capsule, Rfl: 3    lamoTRIgine (LaMICtal) 25 mg tablet, Take 1 tablet (25 mg total) by mouth daily, Disp: 90 tablet, Rfl: 3    tolterodine (DETROL) 1 mg tablet, Take 1 tablet (1 mg total) by mouth 2 (two) times a day, Disp: 180 tablet, Rfl: 2    Review of Systems  Constitutional :no fever, feels well, no tiredness, no recent weight gain or loss  ENT: no ear ache, no loss of hearing, no nosebleeds or nasal discharge, no sore throat or hoarseness  Cardiovascular: no complaints of slow or fast heart beat, no chest pain, no palpitations, no leg claudication or lower extremity edema  Respiratory: no complaints of shortness of shortness of breath, no CROCKETT  Breasts:no complaints of breast pain, breast lump, or nipple discharge  Gastrointestinal: no complaints of abdominal pain, constipation, nausea, vomiting, or diarrhea or bloody stools  Genitourinary : urinary incontinence controlled w/ detrol LA; no complaints of dysuria,pelvic pain, no dysmenorrhea, vaginal discharge or abnormal vaginal bleeding and as noted in HPI    Musculoskeletal: multiple chronic musculoskeletal complaints/pain, gait dysfunction  Integumentary: no complaints of skin rash or lesion, itching or dry skin  Neurological: no complaints of headache, no confusion, no numbness or tingling, no dizziness or fainting  MH: reports stable mental health  Objective      /80 (BP Location: Left arm, Patient Position: Sitting, Cuff Size: Adult)   Ht 5' (1 524 m)   Wt 62 1 kg (137 lb)   BMI 26 76 kg/m²   General:   appears stated age, cooperative, alert normal mood and affect  BMI 26 76   Neck: normal, supple,trachea midline, no masses  Thyroid palpated normal     Heart: regular rate and rhythm, S1, S2 normal, no murmur, click, rub or gallop   Lungs: clear to auscultation bilaterally   Breasts: normal appearance, no masses or tenderness, No nipple retraction or dimpling, No nipple discharge or bleeding, No axillary or supraclavicular adenopathy, Normal to palpation without dominant masses   Abdomen: soft, non-tender, without masses or organomegaly   Vulva: no lesions, mild general postmenopausal atrophy, no white patches or redness/erythema   Vagina: normal vagina, no discharge, exudate, lesion, or erythema   Urethra: normal   Cervix: Normal, no discharge  PAP done  Nontender  Uterus: normal and nontender   Adnexa: no mass, fullness, tenderness   Lymphatic palpation of lymph nodes in neck, axilla, groin and/or other locations: no lymphadenopathy or masses noted   Skin normal skin turgor and no rashes     Psychiatric orientation to person, place, and time: normal  mood and affect: normal

## 2022-08-10 LAB
HPV HR 12 DNA CVX QL NAA+PROBE: NEGATIVE
HPV16 DNA CVX QL NAA+PROBE: NEGATIVE
HPV18 DNA CVX QL NAA+PROBE: NEGATIVE

## 2022-08-12 LAB
LAB AP GYN PRIMARY INTERPRETATION: NORMAL
Lab: NORMAL

## 2022-08-26 DIAGNOSIS — F41.9 ANXIETY: ICD-10-CM

## 2022-08-27 RX ORDER — ALPRAZOLAM 0.25 MG/1
0.25 TABLET ORAL 3 TIMES DAILY PRN
Qty: 60 TABLET | Refills: 0 | Status: SHIPPED | OUTPATIENT
Start: 2022-08-27 | End: 2022-09-28 | Stop reason: SDUPTHER

## 2022-09-19 ENCOUNTER — TELEPHONE (OUTPATIENT)
Dept: FAMILY MEDICINE CLINIC | Facility: CLINIC | Age: 67
End: 2022-09-19

## 2022-09-19 NOTE — TELEPHONE ENCOUNTER
Would not  recommend nyquil since  it has alcohol and if HTN avoid decongestants  if very stuffy at night can try a benadryl

## 2022-09-19 NOTE — TELEPHONE ENCOUNTER
Aleta Regan is asking if it's ok & safe to take otc cold medication (like Nyquil) along with a Xanax that she just took at 12:30pm?

## 2022-09-21 ENCOUNTER — APPOINTMENT (OUTPATIENT)
Dept: LAB | Facility: AMBULARY SURGERY CENTER | Age: 67
End: 2022-09-21
Payer: COMMERCIAL

## 2022-09-21 DIAGNOSIS — R73.01 IMPAIRED FASTING GLUCOSE: ICD-10-CM

## 2022-09-21 DIAGNOSIS — E55.9 VITAMIN D DEFICIENCY: ICD-10-CM

## 2022-09-21 LAB
25(OH)D3 SERPL-MCNC: 30.9 NG/ML (ref 30–100)
EST. AVERAGE GLUCOSE BLD GHB EST-MCNC: 120 MG/DL
HBA1C MFR BLD: 5.8 %

## 2022-09-21 PROCEDURE — 36415 COLL VENOUS BLD VENIPUNCTURE: CPT

## 2022-09-21 PROCEDURE — 82306 VITAMIN D 25 HYDROXY: CPT

## 2022-09-21 PROCEDURE — 83036 HEMOGLOBIN GLYCOSYLATED A1C: CPT

## 2022-09-28 ENCOUNTER — TELEPHONE (OUTPATIENT)
Dept: PSYCHIATRY | Facility: CLINIC | Age: 67
End: 2022-09-28

## 2022-09-28 DIAGNOSIS — F41.9 ANXIETY: ICD-10-CM

## 2022-09-28 RX ORDER — ALPRAZOLAM 0.25 MG/1
0.25 TABLET ORAL 3 TIMES DAILY PRN
Qty: 60 TABLET | Refills: 0 | Status: SHIPPED | OUTPATIENT
Start: 2022-09-28

## 2022-09-28 NOTE — TELEPHONE ENCOUNTER
Pt left message at 12:24 stating that she was returning a call about an opening for an appointment      Pt is now interested in scheduling

## 2022-09-28 NOTE — TELEPHONE ENCOUNTER
Called patient to offer med mgmt appt  Lvm advising patient to return call to Intake Dept at [phone] for scheduling purposes

## 2022-09-28 NOTE — TELEPHONE ENCOUNTER
Patient returned vm and stated they are no longer interested in needing medication management appointments

## 2022-09-28 NOTE — TELEPHONE ENCOUNTER
Behavorial Health Outpatient Intake Questions    Referred by: PCP  Please advised interviewee that they need to answer all questions truthfully to allow for best care and any misrepresentations of information may affect their ability to be seen at this clinic   => Was this discussed? Yes     Behavorial Health Outpatient Intake History -     Presenting Problem (in patient's words): Currently on medication and I feel like it's not doing enough and my doctor can't do much more  I have troubles getting out of bed and having the energy to do things  Are there any developmental disabilities? ? If yes, can they speak to you on the phone? If they are too limited to speak to you on phone, refer out No    Are you taking any psychiatric medications? Yes    => If yes, who prescribes? If yes, are they injectable medications? Does the patient have a language barrier or hearing impairment? No    Have you been treated at [address] [place] [address] by a therapist or a doctor in the past? If yes, who? No    Has the patient been hospitalized for mental health? No   If yes, how long ago was last hospitalization and where was it? Do you actively use alcohol or marijuana or illegal substances? If yes, what and how much - refer out to Drug and alcohol treatment if use is excessive or daily use of illegal substances No concerns of substance abuse are reported  Do you have a community treatment team or ? No    Legal History-     Does the patient have any history of arrests, [facility] time, or DUIs? No  If Yes-  1) What types of charges? 2) When were they last incarcerated? 3) Are they currently on parole or probation? Minor Child-    Who has custody of the child? Is there a custody agreement? If there is a custody agreement remind parent that they must bring a copy to the first appt or they will not be seen       Intake Team, please check with provider before scheduling if flags come up such as:  - complex case  - legal history (other than DUI)  - communication barrier concerns are present  - if, in your judgment, this needs further review    ACCEPTED as a patient Yes  => Appointment Date: 10/7 with [facility]  Referred Elsewhere? No    Name of Insurance Co: [employer] ID# [id number]  [id number] Phone #  If ins is primary or secondary  If patient is a minor, parents information such as Name, D  O B of guarantor      MEDICARE A AND B   [id number]

## 2022-10-07 ENCOUNTER — OFFICE VISIT (OUTPATIENT)
Dept: PSYCHIATRY | Facility: CLINIC | Age: 67
End: 2022-10-07
Payer: COMMERCIAL

## 2022-10-07 VITALS — HEART RATE: 97 BPM | DIASTOLIC BLOOD PRESSURE: 72 MMHG | SYSTOLIC BLOOD PRESSURE: 118 MMHG

## 2022-10-07 DIAGNOSIS — F43.23 ADJUSTMENT DISORDER WITH MIXED ANXIETY AND DEPRESSED MOOD: Primary | ICD-10-CM

## 2022-10-07 DIAGNOSIS — F41.8 MIXED ANXIETY AND DEPRESSIVE DISORDER: ICD-10-CM

## 2022-10-07 PROCEDURE — 90792 PSYCH DIAG EVAL W/MED SRVCS: CPT | Performed by: NURSE PRACTITIONER

## 2022-10-07 RX ORDER — DULOXETIN HYDROCHLORIDE 30 MG/1
30 CAPSULE, DELAYED RELEASE ORAL DAILY
Qty: 90 CAPSULE | Refills: 1 | Status: SHIPPED | OUTPATIENT
Start: 2022-10-07

## 2022-10-07 NOTE — PSYCH
55 [address]an[address]    Name and Date of Birth:  Patsy Hashimoto 79 y o  1955 MRN: [id number]    Date of Visit: October 7, 2022    Reason for visit: Full psychiatric intake assessment for medication management        Chief Complaint   Patient presents with   [facility] Establish Care    Medication Management    Depression    Anxiety       HPI:     Patsy Hashimoto is a 79 y o   female, , domiciled with , retired, w/ PMH of osteoperosis and osteoarthritis and PPH of depression and anxiety, no prior psychiatric admissions, no prior SA, no h/o self-injurious behavior, who presented to the mental health clinic for the initial intake and psychiatric evaluation on October 7, 2022  Jerrodrobert Phan was referred to the clinic by PCP, Dr Donya Ocampo on Cymbalta 60 mg QD, Lamictal 25 mg QD, and Xanax 0 25 mg TID PRN  Tolerating medication well with no medication side effects observed or reported  Not actively involved in individual psychotherapy  Patsy Hashimoto was visited in the clinic; chart reviewed  Presented calm, cooperative and well related, casually dressed w/ good hygiene, good eye contact, gait slow and unsteady with walker, dysphoric mood, constricted affect, talking in normal tone, volume and amount, w/ linear thought process, fair insight and judgement  Reports that symptoms of anxiety and depression were first noticed in the 1960's after having children  Started on psychotropic medications by PCP after fracturing hip in 2015  Initially on Paxil with good result and no side effects  However was switched to Cymbalta later secondary to being a newer agent with multiple indications  Xanax started shortly after  Has been maintained on this combination for several years  During this time, [facility] has struggled with multiple fractures/injuries with mood remaining somewhat baseline until 2020    Several consecutive stressors led to increased anxiety and depressive symptoms  Primary stressor was suffering from fall which resulted in significant injury/surgery to left arm  Participated in physical therapy with disappointing results  Feels that mobility is difficult and has since become more withdrawn  No longer able to visit grandchildren via commercial flight alone  No longer participates in weekly knitting group with friends  PCP initiated on Lamictal 25 mg daily and placed referral to psychiatry  On evaluation today, Gayla Goetz reports seeking treatment due to establish care  Currently reports feeling depressed since arm injury in 2020  Appetite and energy are poor with no change in weight  Endorses amotivation and fluctuating anhedonia  Denies insomnia, but reports ruminating thoughts and difficulty with sleep initiation at times  Will utilize melatonin which she finds effective  No issues with sleep maintenance or early awakening  Notes that she will stay in bed for a few hours in the morning prior to starting her day  Denies hopelessness, worthlessness or feeling guilty  No suicidal ideation, plan, or intent upon direct inquiry  Endorses anxiety which fluctuates throughout the day and is typically centered around "tasks"  States "everything just takes longer and is harder than it used to be"  Endorses irritability and ruminating thoughts  No panic attacks  MAUREEN-7 score: 9  PHQ-9 score: 10    Denies history of trauma with no intrusive, avoidance, negative alterations, or hyperarousal symptoms of PTSD noted  Denied any history of disordered eating  No symptoms of OCD including obsessive, ritualistic acts, intrusive thoughts or images noted  No current or history of manic symptoms  No perceptual disturbances  No paranoid ideations or fixed delusions were elicited  Does not appear preoccupied at time of encounter      No current vaping, cigarette, etoh, marijuana, or other illicit drug use     Review Of Systems:    Constitutional feeling tired, low energy and as noted in HPI   ENT negative   Cardiovascular negative   Respiratory negative   Gastrointestinal negative   Genitourinary urinary incontinence   Musculoskeletal negative   Integumentary negative   Neurological negative   Endocrine negative   Other Symptoms none, all other systems are negative       Current PHQ-9   PHQ-2/9 Depression Screening    Little interest or pleasure in doing things: 3 - nearly every day  Feeling down, depressed, or hopeless: 3 - nearly every day  Trouble falling or staying asleep, or sleeping too much: 0 - not at all  Feeling tired or having little energy: 2 - more than half the days  Poor appetite or overeatin - more than half the days  Feeling bad about yourself - or that you are a failure or have let yourself or your family down: 0 - not at all  Trouble concentrating on things, such as reading the newspaper or watching television: 0 - not at all  Moving or speaking so slowly that other people could have noticed  Or the opposite - being so fidgety or restless that you have been moving around a lot more than usual: 0 - not at all  Thoughts that you would be better off dead, or of hurting yourself in some way: 0 - not at all  PHQ-9 Score: 10   PHQ-9 Interpretation: Moderate depression        Current MAUREEN-7 is   MAUREEN-7 Flowsheet Screening    Flowsheet Row Most Recent Value   Over the last 2 weeks, how often have you been bothered by any of the following problems? Feeling nervous, anxious, or on edge 1   Not being able to stop or control worrying 3   Worrying too much about different things 3   Trouble relaxing 0   Being so restless that it is hard to sit still 0   Becoming easily annoyed or irritable 2   Feeling afraid as if something awful might happen 0   MAUREEN-7 Total Score 9            MAUREEN-7 Flowsheet Screening    Flowsheet Row Most Recent Value   Over the last 2 weeks, how often have you been bothered by any of the following problems? Feeling nervous, anxious, or on edge 1   Not being able to stop or control worrying 3   Worrying too much about different things 3   Trouble relaxing 0   Being so restless that it is hard to sit still 0   Becoming easily annoyed or irritable 2   Feeling afraid as if something awful might happen 0   MAUREEN-7 Total Score 9            Past Psychiatric History:       Past Inpatient Psychiatric Treatment:   No history of past inpatient psychiatric admissions  Past Outpatient Psychiatric Treatment:    Most recently in outpatient psychiatric treatment with a family physician  Past Suicide Attempts: no  Past Violent Behavior: no  Past Psychiatric Medication Trials: Paxil, Cymbalta, Lamictal, Xanax and Melatonin    Traumatic History:       Abuse: no history of physical or sexual abuse  Other Traumatic Events: none     Family Psychiatric History:     Family History   Problem Relation Age of Onset    Skin cancer Mother     Hypertension Father     Colon cancer Maternal Uncle         unknown age-maybe n his 62s    No Known Problems Daughter     No Known Problems Maternal Grandmother     No Known Problems Maternal Grandfather     No Known Problems Paternal Grandmother     No Known Problems Paternal Grandfather     No Known Problems Son     No Known Problems Brother        Substance Abuse History:     Tobacco/alcohol/caffeine: Denies alcohol use, Denies tobacco use, Caffeine intake: 1 chocolate caffeine candy/day  Illicit drugs: has medical marijuana card for anxiety, did not find it effective  Last use 1 year ago    No past legal actions or arrests secondary to substance intoxication  The patient denies prior DWIs/DUIs  No history of outpatient/inpatient rehabilitation programs  [place] does not exhibit objective evidence of substance withdrawal during today's examination nor does [place] appear under the influence of any psychoactive substance          Social History:      Developmental: Denies a history of milestone/developmental delay  Denies exposure to in-utero substances  There is no documented history of IEP or need for special education  Education: some college  Marital history:   Children: 2 children (ages 28 and 40)  Living arrangement, social support:   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[employer], now retired and on disability  Access to firearms: Has direct access to weapons/firearms, locked in safe  Dallis Humbles has no history of arrests or violence with a deadly weapon  Past Medical History:    Past Medical History:   Diagnosis Date    Anxiety     Scoliosis     Skin cancer         Past Surgical History:   Procedure Laterality Date    BACK SURGERY  1974    velarde damon in back for scoliosis    COLONOSCOPY  01/01/2013    ELBOW FRACTURE REPAIR Left 8/11/2020    Procedure: ORIF left humeral shaft, ulnar nerve release, tenotomy anconeus epitrochlearis, radial nerve dissection;  Surgeon: Osito Alvarado MD;  Location: [facility];  Service: Orthopedics    JOINT REPLACEMENT      left hip and knee    KIDNEY STONE SURGERY       Allergies   Allergen Reactions    Macrobid [Nitrofurantoin] Other (See Comments)     Pt does not remember what reaction was       History Review:     The following portions of the patient's history were reviewed and updated as appropriate: allergies, current medications, past family history, past medical history, past social history, past surgical history and problem list     OBJECTIVE:    Vital signs in last 24 hours:    Vitals:    10/07/22 1312   BP: 118/72   Pulse: 97       Mental Status Evaluation:  Appearance and attitude: appeared as stated age, cooperative and attentive, casually dressed, with good hygiene  Eye contact: good  Motor Function: slow, unsteady gait with walker  Gait/station: Unsteady, slow and  needs assistive device  Speech: normal for rate, rhythm, volume, latency, amount  Language: No overt abnormality  Mood/affect: depressed, dysphoric, anxious / Affect was constricted but reactive, mood congruent, blunted  Thought Processes: sequential and goal-directed  Thought content: denies suicidal ideation or homicidal ideation; no delusions or first rank symptoms  Associations: intact associations  Perceptual disturbances: denies Auditory/Visual/Tactile Hallucinations  Orientation: oriented to time, person, place and to the situational context  Cognitive Function: intact  Memory: recent and remote memory grossly intact  Intellect: average  Fund of knowledge: aware of current events, aware of past history and vocabulary average  Impulse control: good  Insight/judgment: good/good    Pain: denied    Lab Results: I have personally reviewed all pertinent laboratory/tests results  CBC:   Lab Results   Component Value Date    WBC 10 2 12/01/2020    RBC 4 81 12/01/2020    HGB 14 3 12/01/2020    HCT 43 9 12/01/2020    MCV 91 3 12/01/2020     12/01/2020    MCH 29 7 12/01/2020    MCHC 32 6 12/01/2020    RDW 13 6 12/01/2020    MPV 10 6 08/13/2020    NRBC 0 08/13/2020    NEUTROABS 7,405 12/01/2020     CMP:   Lab Results   Component Value Date    K 3 9 08/13/2020     08/13/2020    CO2 31 08/13/2020    BUN 15 08/13/2020    CREATININE 0 36 (L) 08/13/2020    CALCIUM 7 5 (L) 08/13/2020    AST 19 08/10/2020    ALT 22 08/10/2020    ALKPHOS 50 08/10/2020    EGFR 114 08/13/2020     Lipid Profile:   Lab Results   Component Value Date    CHOLESTEROL 184 10/14/2021    TRIG 83 10/14/2021    HDL 72 10/14/2021    [address] 95 10/14/2021    [place] 112 10/14/2021     Hemoglobin A1C:   Lab Results   Component Value Date    HGBA1C 5 8 (H) 09/21/2022     Thyroid Studies: No results found for: QMU7ZGLNESVG, T3FREE, FREET4, G7TDOOE, I1VKMDK  Vitamin D Level   Lab Results   Component Value Date    YVCG74EKJHBM 30 9 09/21/2022     Vitamin B12 No results found for: ASUCTZDQ85  Folate No results found for: FOLATE  EKG No results found for: [place], PRINT, QRSDINT, QTINT, PAXIS, QRSAXIS, TWAVEAXIS    Suicide/Homicide Risk Assessment:    Risk of Harm to Self:  The following ratings are based on assessment at the time of the interview  Demographic risk factors include:   Historical Risk Factors include: chronic depressive symptoms, chronic anxiety symptoms  Recent Specific Risk Factors include: chronic health problems, social isolation  Protective Factors: no current suicidal ideation, access to mental health treatment, being a parent, being , compliant with medications, compliant with mental health treatment, connection to own children, effective decision-making skills, effective problem solving skills, having a desire to be alive, having a desire to live, having a sense of purpose or meaning in life, impulse control, medical compliance, no substance use problems, resiliency, responsibilities and duties to others, stable living environment  Based on today's assessment, Demetrice Cyr presents the following risk of harm to self: low    Risk of Harm to Others: The following ratings are based on assessment at the time of the interview  Demographic Risk Factors include: none  Historical Risk Factors include: none  Recent Specific Risk Factors include: none  Protective Factors: no current homicidal ideation, able to manage anger well, access to mental health treatment, being a parent, being , compliant with medications, compliant with mental health treatment, connection to own children, good impulse control, medical compliance, moral system, no substance use problems, personal beliefs, resilience, responsibilities and duties to others, safe and stable living environment, strong relationships  Based on today's assessment, Demetrice Cyr presents the following risk of harm to others: minimal    The following interventions are recommended: no intervention changes needed   Although patient's acute lethality risk is LOW, long-term/chronic lethality risk is mildly elevated given physical limitations, depression, and anxiety  However, at the current moment, Luis Carlos Ascencio is future-oriented, forward-thinking, and demonstrates ability to act in a self-preserving manner as evidenced by volitionally presenting to the clinic today, seeking treatment  At this time, inpatient hospitalization is not currently warranted  To mitigate future risk, patient should adhere to treatment recommendations, avoid alcohol/illicit substance use, utilize community-based resources and familiar support, and prioritize mental health treatment  Based on today's assessment and clinical criteria, Chevy Peterson contracts for safety and is not an imminent risk of harm to self or others  Outpatient level of care is deemed appropriate at this present time  Luis Carlos Ascencio understands that if they are no longer able to contract for safety, they need to call/contact the outpatient office including this writer, call/contact crisis and/or attend to the nearest Emergency Department for immediate evaluation  Assessment/Plan:     In summary, Chevy Peterson is a 79 y o   female, , domiciled with , retired, w/ PMH of osteoperosis and osteoarthritis and PPH of depression and anxiety, no prior psychiatric admissions, no prior SA, no h/o self-injurious behavior, who presented to the mental health clinic for the initial intake and psychiatric evaluation on October 7, 2022  Luis Carlos Ascencio was referred to the clinic by PCP, Dr Bib Mahan on Cymbalta 60 mg QD, Lamictal 25 mg QD, and Xanax 0 25 mg TID PRN  Tolerating medication well with no medication side effects observed or reported  Not actively involved in individual psychotherapy  Reports that symptoms of anxiety and depression were first noticed in the 1960's after having children  Started on psychotropic medications by PCP after fracturing hip in 2015    Initially on Paxil with good result and no side effects  However was switched to Cymbalta later secondary to being a newer agent with multiple indications  Xanax started shortly after  Has been maintained on this combination for several years  During this time, Frank Pena has struggled with multiple fractures/injuries with mood remaining somewhat baseline until 2020  Several consecutive stressors led to increased anxiety and depressive symptoms  Primary stressor was suffering from fall which resulted in significant injury/surgery to left arm  Participated in physical therapy with disappointing results  Feels that mobility is difficult and has since become more withdrawn  No longer able to visit grandchildren via commercial flight alone  No longer participates in weekly knitting group with friends  PCP initiated on Lamictal 25 mg daily and placed referral to psychiatry  PHQ-9 score: 10; MAUREEN-7 score: 9  Her current presentation meets criteria for Adjustment disorder with mixed anxiety and depressed mood  Currently she is not at risk for suicide, homicide, self-injury, aggressive behaviors, self-neglect, or neglect of dependents or children  Given this presentation, the patient will benefit from further outpatient follow up for management of her symptoms  Psychopharmacologically, Frank Pena has been maintained on her current medication regimen of Cymbalta and Xanax for many years  Cymbalta has room for optimization as it remains at 60 mg daily  BP stable  Primary concern is amotivation, fatigue, and low mood  In effort to limit polypharmacy and minimize current use of benzodiazepines (fall risk, [place]) will optimize dose of Cymbalta to 90 mg daily as it is beneficial for both depression and anxiety  Will keep Lamictal 25 mg daily as she has not had any side effects, but it is likely not beneficial at this dose and will either be optimized or discontinued at next session    Discussion on increasing social connectivity, decreasing isolation, and initiating therapy held  Though reluctant, understands that she will need to place emphasis on this aspect of her care  Will reach out to [facility] group for participation next Thursday  Will contact insurance carrier for list of therapist        Plan:  1  Optimize Cymbalta 90 mg daily for depression, anxiety, and chronic pain  2  Continue Lamictal 25 mg daily for antidepressant augmentation  3  Continue Xanax 0 25 mg BID as needed for anxiety   4  Psychotherapy- declines at this time, but will contact insurance if changes her mind  5  Follow up with primary care provider for ongoing medical care  6  Follow up with this provider in 4 weeks         Diagnoses and all orders for this visit:    Adjustment disorder with mixed anxiety and depressed mood  -     DULoxetine (Cymbalta) 30 mg delayed release capsule; Take 1 capsule (30 mg total) by mouth daily In addition to 60 mg capsule for a total of 90 mg daily  Mixed anxiety and depressive disorder  -     Ambulatory Referral to Psychiatry            - Psychoeducation provided regarding the importance of exercise and health dietary choices and their impact on mood, energy, and motivation   - Counseled to avoid ETOH, illicit substances, and nicotine secondary to the detrimental effects of these substances on mental and physical health  - Psychoeducation regarding medication benefits and risks, side effects, indications and alternatives provided to the patient and the importance of compliance with psychiatric medication reiterated  The Juan Meneses verbalized understanding and agreed with the plan  - Educated on the [address]  and Environmental Approach to mental health    - The patient was educated about 24 hour and weekend coverage for urgent situations accessed by calling [facility] main practice number  - Patient was educated to call [facility] ([phone] [[phone]]) for behavioral crisis at any time, 911 for any safety concerns, or go to nearest ER if her symptoms become overwhelming or unmanageable  Medications Risks/Benefits:      Risks, Benefits And Possible Side Effects Of Medications:    Risks, benefits, and possible side effects of medications explained to Juan including risk of suicidality and serotonin syndrome related to treatment with antidepressants and risks of misuse, abuse or dependence, sedation and respiratory depression related to treatment with benzodiazepine medications  She verbalizes understanding and agreement for treatment  PARQ completed including serotonin syndrome, SIADH, worsened depression/suicidality, induction of dalia, blood pressure changes and GI distress, weight gain, sexual side effects, insomnia, sedation, potential for drug interactions, and others  Risks and side effects of chronic benzodiazepine use discussed with patient, including risk for falls, sedation, respiratory depression (especially if taken in higher dose(s) than prescribed or if taken together with another sedating medication or alcohol or other sedating substance), as well as risk of addiction (especially if taken more often or at higher doses than prescribed) and withdrawal (if stops abruptly, roseann if taken more often or at higher doses) including seizures and death  The patient was instructed to not drive or operate heavy machinery while taking benzodiazepines, as the medication can cause increased drowsiness  Patient verbalized understanding and would like to continue at current dose  Controlled Medication Discussion:     Juan has been filling controlled prescriptions on time as prescribed according to [facility]  Discussed with Martinez the risks of sedation, respiratory depression, impairment of ability to drive and potential for abuse and addiction related to treatment with benzodiazepine medications   She understands risk of treatment with benzodiazepine medications, agrees to not drive if feels impaired and agrees to take medications as prescribed    Treatment Plan:    Completed and signed during the session: Yes - Treatment Plan done but not signed at time of office visit due to:  Plan reviewed in person and verbal consent given due to Ignacia social distnuno    Note Share Disclaimer:      This note was not shared with the patient due to reasonable likelihood of causing patient harm      JUAN CARLOS Jaramillo 10/07/22

## 2022-10-07 NOTE — BH TREATMENT PLAN
TREATMENT PLAN (Medication Management Only)        [facility]    Name and Date of Birth:  Leonila Hernandez 79 y o  1955  Date of Treatment Plan: October 7, 2022  Diagnosis/Diagnoses:    1  Adjustment disorder with mixed anxiety and depressed mood    2  Mixed anxiety and depressive disorder      Strengths/Personal Resources for Self-Care: supportive family, supportive friends, taking medications as prescribed, ability to communicate needs, ability to listen, ability to reason, ability to understand psychiatric illness, average or above intelligence, family ties, financial means, financial security, general fund of knowledge, motivation for treatment, ability to negotiate basic needs, being resoureceful, special hobby/interest, willingness to work on problems, work skills  Area/Areas of need (in own words): anxiety symptoms, depressive symptoms  1  Long Term Goal: improve depression  Target Date:6 months - 4/7/2023  Person/Persons responsible for completion of goal: Ben Contreras  2  Short Term Objective (s) - How will we reach this goal?:   A  Provider new recommended medication/dosage changes and/or continue medication(s): increase Cymbalta  B  Increase socialization with peers  C  Spend more time with friends and family  Target Date:6 months - 4/7/2023  Person/Persons Responsible for Completion of Goal: Ben Contreras  Progress Towards Goals: starting treatment  Treatment Modality: medication management every 4 weeks  Review due 180 days from date of this plan: 6 months - 4/7/2023  Expected length of service: ongoing treatment  My Physician/PA/NP and I have developed this plan together and I agree to work on the goals and objectives  I understand the treatment goals that were developed for my treatment

## 2022-10-12 PROBLEM — Z00.00 MEDICARE ANNUAL WELLNESS VISIT, SUBSEQUENT: Status: RESOLVED | Noted: 2022-01-20 | Resolved: 2022-10-12

## 2022-10-19 ENCOUNTER — TELEPHONE (OUTPATIENT)
Dept: FAMILY MEDICINE CLINIC | Facility: CLINIC | Age: 67
End: 2022-10-19

## 2022-10-19 DIAGNOSIS — Z74.09 IMPAIRED FUNCTIONAL MOBILITY, BALANCE, GAIT, AND ENDURANCE: Primary | ICD-10-CM

## 2022-10-19 NOTE — TELEPHONE ENCOUNTER
[facility] requesting order for PT for gait and balance   Please fax to # [phone]

## 2022-10-28 ENCOUNTER — IMMUNIZATIONS (OUTPATIENT)
Dept: FAMILY MEDICINE CLINIC | Facility: CLINIC | Age: 67
End: 2022-10-28

## 2022-10-28 DIAGNOSIS — Z23 NEED FOR INFLUENZA VACCINATION: Primary | ICD-10-CM

## 2022-11-01 DIAGNOSIS — F41.9 ANXIETY: ICD-10-CM

## 2022-11-01 DIAGNOSIS — N39.41 URGE INCONTINENCE OF URINE: ICD-10-CM

## 2022-11-01 RX ORDER — ALPRAZOLAM 0.25 MG/1
0.25 TABLET ORAL 3 TIMES DAILY PRN
Qty: 60 TABLET | Refills: 0 | Status: SHIPPED | OUTPATIENT
Start: 2022-11-01

## 2022-11-01 RX ORDER — TOLTERODINE TARTRATE 1 MG/1
1 TABLET, EXTENDED RELEASE ORAL 2 TIMES DAILY
Qty: 180 TABLET | Refills: 0 | Status: SHIPPED | OUTPATIENT
Start: 2022-11-01

## 2022-11-03 NOTE — PSYCH
Virtual Visit Disclaimer:       TeleMed provider: NEETA Grullon  Location: at [facility], [address] in [place], [place], [address]    Verification of patient location:     Patient is currently located in the state [facility]  Patient is currently located in a state in which I am licensed     After connecting through televideo, the patient was identified by name and date of birth  Serge Gómez was informed that this is a telemedicine visit that is being conducted through [address] [facility], and the patient was informed that this is a secure, HIPAA-compliant platform  My office door was closed  No one else was in the room  Serge Gómez acknowledged consent and understanding of privacy and security of the video platform  Bart Chaparro understands that the online visit is based solely on information provided by the patient, and that, in the absence of a face-to-face physical evaluation by the provider, the diagnosis Bart Chaparro  receives is both limited and provisional in terms of accuracy and completeness  Serge Gómez understands that they can discontinue the visit at any time  I informed Bart Chaparro that I have reviewed their record in EPIC and presented the opportunity for them to ask any questions regarding the visit today  Aroldomarie Shultzwinston voiced understanding and consented to these terms  Bart Chaparro is aware this is a billable service  Virtual visit start and stop times:    Start Time: 10:42    Stop Time: 10:58    I spent 16 minutes with patient today in which greater than 50% of the time was spent in counseling/coordination of care        Guilherme Grullon 11/04/22   MEDICATION MANAGEMENT NOTE        [facility]      Name and Date of Birth:  Serge Gómez 79 y o  1955 MRN: [id number]    Date of Visit: November 4, 2022    Reason for Visit:   Chief Complaint   Patient presents with   • Medication Management   • Follow-up   • Anxiety   • Depression         SUBJECTIVE:    Annalee Carreon is a 79 y o  female with past psychiatric history significant for Adjustment Disorder and with mixed anxiety and depressed mood who was personally seen and evaluated today at the [address] [place] [address] E outpatient clinic for follow-up and medication management  Completes psychiatric assessment without difficulty  Kendalladithya Addis endorses compliance with psychotropic medication regimen (Cymbalta, Xanax, Lamictal)  She denies any current adverse medication side effects  Nicky Mancini states that since their previous outpatient psychiatric appointment with this writer, started physical therapy and got a new puppy  Is feeling better physically which is improving mood  Has not been engaging more with others as suggested at last appointment, however feels that new puppy has filled that void  She has not taken the additional Cymbalta 30 mg daily in addition to 60 mg daily  Felt that she wanted to see how mood adjusted with new changes occurring  Appetite remains decreased  No weight loss  Sleep good  Anxiety improved  No panic attacks  No acute lethality concern  Current Rating Scores:     Current PHQ-9   PHQ-2/9 Depression Screening    Little interest or pleasure in doing things: 1 - several days  Feeling down, depressed, or hopeless: 1 - several days  Trouble falling or staying asleep, or sleeping too much: 0 - not at all  Feeling tired or having little energy: 1 - several days  Poor appetite or overeatin - several days  Feeling bad about yourself - or that you are a failure or have let yourself or your family down: 0 - not at all  Trouble concentrating on things, such as reading the newspaper or watching television: 0 - not at all  Moving or speaking so slowly that other people could have noticed   Or the opposite - being so fidgety or restless that you have been moving around a lot more than usual: 0 - not at all  Thoughts that you would be better off dead, or of hurting yourself in some way: 0 - not at all  PHQ-9 Score: 4   PHQ-9 Interpretation: No or Minimal depression        Current MAUREEN-7 is   MAUREEN-7 Flowsheet Screening    Flowsheet Row Most Recent Value   Over the last 2 weeks, how often have you been bothered by any of the following problems? Feeling nervous, anxious, or on edge 1   Not being able to stop or control worrying 1   Worrying too much about different things 1   Trouble relaxing 0   Being so restless that it is hard to sit still 0   Becoming easily annoyed or irritable 0   Feeling afraid as if something awful might happen 0   MAUREEN-7 Total Score 3        Review Of Systems:      Constitutional negative   ENT negative   Cardiovascular negative   Respiratory negative   Gastrointestinal negative   Genitourinary negative   Musculoskeletal negative   Integumentary negative   Neurological negative   Endocrine negative   Other Symptoms none, all other systems are negative       Past Psychiatric History: (unchanged information from previous note copied and italicized) - Information that is bolded has been updated  Past Inpatient Psychiatric Treatment:   No history of past inpatient psychiatric admissions  Past Outpatient Psychiatric Treatment:    Most recently in outpatient psychiatric treatment with a family physician  Past Suicide Attempts: no  Past Violent Behavior: no  Past Psychiatric Medication Trials: Paxil, Cymbalta, Lamictal, Xanax and Melatonin    Substance Abuse History: (unchanged information from previous note copied and italicized) - Information that is bolded has been updated  Tobacco/alcohol/caffeine: Denies alcohol use, Denies tobacco use, Caffeine intake: 1 chocolate caffeine candy/day  Illicit drugs: has medical marijuana card for anxiety, did not find it effective  Last use 1 year ago     No past legal actions or arrests secondary to substance intoxication  The patient denies prior DWIs/DUIs   No history of outpatient/inpatient rehabilitation programs  Ayaka Veliz does not exhibit objective evidence of substance withdrawal during today's examination nor does Ayaka Veliz appear under the influence of any psychoactive substance  Social History: (unchanged information from previous note copied and italicized) - Information that is bolded has been updated  Developmental: Denies a history of milestone/developmental delay  Denies exposure to in-utero substances  There is no documented history of IEP or need for special education  Education: some college  Marital history:   Children: 2 children (ages 28 and 40)  Living arrangement, social support:   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[employer], now retired and on disability  Access to firearms: Has direct access to weapons/firearms, locked in safe  Browne Parrot has no history of arrests or violence with a deadly weapon  Traumatic History: (unchanged information from previous note copied and italicized) - Information that is bolded has been updated       Abuse: no history of physical or sexual abuse  Other Traumatic Events: none       Past Medical History:    Past Medical History:   Diagnosis Date   • Anxiety    • Scoliosis    • Skin cancer         Past Surgical History:   Procedure Laterality Date   • BACK SURGERY  1974    velarde damon in back for scoliosis   • COLONOSCOPY  01/01/2013   • ELBOW FRACTURE REPAIR Left 8/11/2020    Procedure: ORIF left humeral shaft, ulnar nerve release, tenotomy anconeus epitrochlearis, radial nerve dissection;  Surgeon: Radha Perez MD;  Location: [facility];  Service: Orthopedics   • JOINT REPLACEMENT      left hip and knee   • KIDNEY STONE SURGERY       Allergies   Allergen Reactions   • Macrobid [Nitrofurantoin] Other (See Comments)     Pt does not remember what reaction was       Substance Abuse History:    Social History     Substance and Sexual Activity   Alcohol Use Yes     Social History Substance and Sexual Activity   Drug Use Yes   • Types: Marijuana    Comment: Medical Marijuana       Social History:    Social History     Socioeconomic History   • Marital status: /Civil Union     Spouse name: Not on file   • Number of children: Not on file   • Years of education: Not on file   • Highest education level: Not on file   Occupational History   • Not on file   Tobacco Use   • Smoking status: Never Smoker   • Smokeless tobacco: Never Used   Vaping Use   • Vaping Use: Never used   Substance and Sexual Activity   • Alcohol use:  Yes   • Drug use: Yes     Types: Marijuana     Comment: Medical Marijuana   • Sexual activity: Not Currently     Partners: Male   Other Topics Concern   • Not on file   Social History Narrative    Abnormal MMG:   No      Abnormal Pap:   No       Date of last pap smear:         PAP/HPV-Neg     Do you have a discharge:   No      Menses monthly:   No      Age at first child:   27      Age at menarche:   25      Current birth control method:   Menopause      History of Abnormal Pap Smear:   No      STD/PID:   No      If post menopausal, age at menopause:   46      Abnormal Pap:   No      Sexually Active:   Yes      Sexual Partners:   Male      Date of Last Colonoscopy:       due in      History of ovarian cysts:   No      Date of last mammogram:   2018    Total:   2      Full term:   2      Livin     1 boy, 1 girl     Tobacco smoking status:   Never smoker      Smoking - how much:   None     Most recent tobacco use screenin2019      Do you currently or have you served in the [employer]:   No      Were you activated, into active duty, as a member of the [employer] or as a Reservist:   No      Occupation:   retired      Education:   15      some college     Marital status:         Sexual orientation:   Heterosexual      Exercise level:   Occasional      Diet:   Regular       General stress level:   Medium      Alcohol intake: Occasional       Caffeine intake:   Occasional      Illicit drugs:   denies      Seat belts used routinely:   Yes      Sunscreen used routinely:   Yes      Live alone or with others:   with others      History of Physical, Emotional or Sexual Abuse:   No      Presence of domestic violence:   No      Sexually active:   Yes       Do you feel safe at home: Yes                   Social Determinants of Health     Financial Resource Strain: Not on file   Food Insecurity: Not on file   Transportation Needs: Not on file   Physical Activity: Not on file   Stress: Not on file   Social Connections: Not on file   Intimate Partner Violence: Not on file   Housing Stability: Not on file       Family Psychiatric History:     Family History   Problem Relation Age of Onset   • Skin cancer Mother    • Hypertension Father    • Colon cancer Maternal Uncle         unknown age-maybe n his 62s   • No Known Problems Daughter    • No Known Problems Maternal Grandmother    • No Known Problems Maternal Grandfather    • No Known Problems Paternal Grandmother    • No Known Problems Paternal Grandfather    • No Known Problems Son    • No Known Problems Brother        History Review:  The following portions of the patient's history were reviewed and updated as appropriate: allergies, current medications, past family history, past medical history, past social history, past surgical history and problem list          OBJECTIVE:     Vital signs in last 24 hours:    Vitals:        Mental Status Evaluation:    Appearance age appropriate, casually dressed   Behavior cooperative, calm   Speech normal rate, normal volume, normal pitch   Mood euthymic   Affect normal range and intensity, appropriate   Thought Processes organized, goal directed   Associations intact associations   Thought Content no overt delusions   Perceptual Disturbances: no auditory hallucinations, no visual hallucinations   Abnormal Thoughts  Risk Potential Suicidal ideation - None  Homicidal ideation - None  Potential for aggression - No   Orientation oriented to person, place, time/date and situation   Memory recent and remote memory grossly intact   Consciousness alert and awake   Attention Span Concentration Span attention span and concentration are age appropriate   Intellect appears to be of average intelligence   Insight intact   Judgement intact   Muscle Strength and  Gait unable to assess today due to virtual visit   Motor activity unable to assess today due to virtual visit   Language no difficulty naming common objects, no difficulty repeating a phrase   Fund of Knowledge adequate knowledge of current events  adequate fund of knowledge regarding past history  adequate fund of knowledge regarding vocabulary    Pain none   Pain Scale 0       Laboratory Results: I have personally reviewed all pertinent laboratory/tests results    Lethality Statement:    Based on today's assessment and clinical criteria, Dontrell Baer contracts for safety and is not an imminent risk of harm to self or others  Outpatient level of care is deemed appropriate at this current time  Kellie Brown understands that if they can no longer contract for safety, they need to call the office or report to their nearest Emergency Room for immediate evaluation  Assessment/Plan:     In summary, Dontrell Baer is a 79 y o   female, , domiciled with , retired, w/ PMH of osteoperosis and osteoarthritis and PPH of depression and anxiety, no prior psychiatric admissions, no prior SA, no h/o self-injurious behavior, who presented to the mental health clinic for the initial intake and psychiatric evaluation on October 7, 2022  Kellie Brown was referred to the clinic by PCP, Dr Naye Marshall on Cymbalta 60 mg QD, Lamictal 25 mg QD, and Xanax 0 25 mg TID PRN  Tolerating medication well with no medication side effects observed or reported  Not actively involved in individual psychotherapy    Reports that symptoms of anxiety and depression were first noticed in the 1960's after having children  Started on psychotropic medications by PCP after fracturing hip in 2015  Initially on Paxil with good result and no side effects  However was switched to Cymbalta later secondary to being a newer agent with multiple indications  Xanax started shortly after  Has been maintained on this combination for several years  During this time, Theresa De Paz has struggled with multiple fractures/injuries with mood remaining somewhat baseline until 2020  Several consecutive stressors led to increased anxiety and depressive symptoms  Primary stressor was suffering from fall which resulted in significant injury/surgery to left arm  Participated in physical therapy with disappointing results  Feels that mobility is difficult and has since become more withdrawn  No longer able to visit grandchildren via commercial flight alone  No longer participates in weekly knitting group with friends  PCP initiated on Lamictal 25 mg daily and placed referral to psychiatry  PHQ-9 score: 10; MAUREEN-7 score: 9  Her current presentation meets criteria for Adjustment disorder with mixed anxiety and depressed mood  Past PHQ-9 scores: 10  Past MAUREEN-7 scores: 9    Current PHQ9 score:4  Current MAUREEN-7 score:3      Psychopharmacologically, Theresa De Paz has chosen not to optimize Cymbalta dose to 90 mg daily, increase participation in activities with friends, or engage in therapy as recommended by this writer  However, she has engaged in physical therapy and bought a new puppy  Is feeling better physically which has had a positive impact on mood and anxiety  As such, will continue on Cymbalta 60 mg daily  Discussed low dose Lamictal and subtherapeutic dose  Will discontinue Lamictal as it is contributing to polypharmacy      Risks/benefits/alternativies to treatment discussed, including a myriad of potential adverse medication side effects, to which Ayaka Veliz voiced understanding and consented fully to treatment  Also, patient is amenable to calling/contacting the outpatient office including this writer if any acute adverse effects of their medication regimen arise in addition to any comments or concerns pertaining to their psychiatric management  Plan:  1  Continue Cymbalta 60 mg daily for depression, anxiety, and chronic pain  2  Discontinue Lamictal 25 mg daily as current dose is subtherapeutic and mood is stabilized  3  Continue Xanax 0 25 mg BID as needed for anxiety   4  Psychotherapy- declines at this time, but will contact insurance if changes her mind  5  Follow up with primary care provider for ongoing medical care  6  Follow up with this provider in 3 months        Diagnoses and all orders for this visit:    Adjustment disorder with mixed anxiety and depressed mood           - Psychoeducation provided regarding the importance of exercise and health dietary choices and their impact on mood, energy, and motivation   - Counseled to avoid ETOH, illicit substances, and nicotine secondary to the detrimental effects of these substances on mental and physical health  - Encouraged to engage in non-verbal forms of therapy such as art therapy, music therapy, and mindfulness  Aware of 24 hour and weekend coverage for urgent situations accessed by calling [facility] main practice number    Medications Risks/Benefits      Risks, Benefits And Possible Side Effects Of Medications:    Risks, benefits, and possible side effects of medications explained to Ayaka Veliz including risk of suicidality and serotonin syndrome related to treatment with antidepressants and risks of misuse, abuse or dependence, sedation and respiratory depression related to treatment with benzodiazepine medications  She verbalizes understanding and agreement for treatment      Controlled Medication Discussion:     Ayaka Veliz has been filling controlled prescriptions on time as prescribed according to [facility]  Discussed with Rose Mann the risks of sedation, respiratory depression, impairment of ability to drive and potential for abuse and addiction related to treatment with benzodiazepine medications   She understands risk of treatment with benzodiazepine medications, agrees to not drive if feels impaired and agrees to take medications as prescribed    Psychotherapy Provided:     Individual psychotherapy provided: Yes  Counseling was provided during the session today for 16 minutes  Medication education provided to [address] [place] [address] discussed during session  Importance of medication and treatment compliance reviewed with Rose Mann  Cognitive therapy was utilized during the session  Reassurance and supportive therapy provided  Crisis/safety plan discussed with Compa Means     Treatment Plan:    Completed and signed during the session: Not applicable - Treatment Plan not due at this session    Visit Time    Visit Start Time: 10:42 AM  Visit Stop Time: 10:58 AM  Total Visit Duration: 16 minutes    Fausto Walker, [address] [place] [address] 11/04/22

## 2022-11-04 ENCOUNTER — TELEMEDICINE (OUTPATIENT)
Dept: PSYCHIATRY | Facility: CLINIC | Age: 67
End: 2022-11-04

## 2022-11-04 DIAGNOSIS — F43.23 ADJUSTMENT DISORDER WITH MIXED ANXIETY AND DEPRESSED MOOD: Primary | ICD-10-CM

## 2022-11-29 ENCOUNTER — OFFICE VISIT (OUTPATIENT)
Dept: FAMILY MEDICINE CLINIC | Facility: CLINIC | Age: 67
End: 2022-11-29

## 2022-11-29 VITALS
RESPIRATION RATE: 16 BRPM | WEIGHT: 138 LBS | BODY MASS INDEX: 27.09 KG/M2 | HEIGHT: 60 IN | SYSTOLIC BLOOD PRESSURE: 134 MMHG | OXYGEN SATURATION: 97 % | DIASTOLIC BLOOD PRESSURE: 82 MMHG | HEART RATE: 100 BPM | TEMPERATURE: 98.9 F

## 2022-11-29 DIAGNOSIS — M81.0 AGE RELATED OSTEOPOROSIS, UNSPECIFIED PATHOLOGICAL FRACTURE PRESENCE: ICD-10-CM

## 2022-11-29 DIAGNOSIS — F41.9 ANXIETY: ICD-10-CM

## 2022-11-29 DIAGNOSIS — F43.23 ACUTE ADJUSTMENT DISORDER WITH MIXED ANXIETY AND DEPRESSED MOOD: Primary | ICD-10-CM

## 2022-11-29 DIAGNOSIS — E55.9 VITAMIN D DEFICIENCY: ICD-10-CM

## 2022-11-29 DIAGNOSIS — R73.01 IMPAIRED FASTING GLUCOSE: ICD-10-CM

## 2022-11-29 DIAGNOSIS — N39.41 URGE INCONTINENCE OF URINE: ICD-10-CM

## 2022-11-29 RX ORDER — ALPRAZOLAM 0.25 MG/1
0.25 TABLET ORAL 3 TIMES DAILY PRN
Qty: 60 TABLET | Refills: 0 | Status: SHIPPED | OUTPATIENT
Start: 2022-11-29

## 2022-11-29 NOTE — PROGRESS NOTES
Assessment/Plan:         Problem List Items Addressed This Visit        Endocrine    Impaired fasting glucose     A1C 5 8 in Sept 2022  Advised pt to follow a low carb diet and to exercise on a regular basis  Musculoskeletal and Integument    Age related osteoporosis     Last dexascan was in Aug 2021 and had slight decrease  Last Prolia shot was in June 2022  Continue calcium 1200 mg qd and Vit D 1000 U qd  Pt also advised to perform weight-bearing exercise on a regular basis  Recheck dexascan in Aug 2023  Other    Vitamin D deficiency     Level 30 9 in Sept 2022  Increase to 2000 U qd  Urge incontinence of urine     Stable  Continue tolterodine 1 mg bid         Acute adjustment disorder with mixed anxiety and depressed mood - Primary     Pt has been seeing Psychiatry and last visit was in Nov 2022  Continue cymbalta 60 mg qd and alprazolam 0 25 mg as needed  Relevant Medications    ALPRAZolam (XANAX) 0 25 mg tablet   Other Visit Diagnoses     Anxiety        Relevant Medications    ALPRAZolam (XANAX) 0 25 mg tablet            Subjective:      Patient ID: Alecia Goldberg is a 79 y o  female  Pt here for f/u Anxiety/Depression, Osteoporosis, Urge Incontinence, Vit D def, Impaired Fasting Glucose  Pt doing ok  No cp/sob  Gets HA at times in AM and takes tylenol  Pt has been seeing Psych and wants to go up on cymbalta to 90 mg qd  Pt has had increased right shoulder pain since had a fall in the bathroom  Pt getting PT for it 2 times per week  Takes Ca/D and last prolia was in June 2022  Had flu shot  The following portions of the patient's history were reviewed and updated as appropriate:   Past Medical History:  She has a past medical history of Anxiety, Scoliosis, and Skin cancer  ,  _______________________________________________________________________  Medical Problems:  does not have any pertinent problems on file ,  _______________________________________________________________________  Past Surgical History:   has a past surgical history that includes Joint replacement; Back surgery (1974); Colonoscopy (01/01/2013); Kidney stone surgery; and ELBOW FRACTURE REPAIR (Left, 8/11/2020)  ,  _______________________________________________________________________  Family History:  family history includes Colon cancer in her maternal uncle; Hypertension in her father; No Known Problems in her brother, daughter, maternal grandfather, maternal grandmother, paternal grandfather, paternal grandmother, and son; Skin cancer in her mother ,  _______________________________________________________________________  Social History:   reports that she has never smoked  She has never used smokeless tobacco  She reports current alcohol use  She reports current drug use  Drug: Marijuana  ,  _______________________________________________________________________  Allergies:  is allergic to macrobid [nitrofurantoin]     _______________________________________________________________________  Current Outpatient Medications   Medication Sig Dispense Refill   • ALPRAZolam (XANAX) 0 25 mg tablet Take 1 tablet (0 25 mg total) by mouth 3 (three) times a day as needed for anxiety 60 tablet 0   • cholecalciferol (VITAMIN D3) 1,000 units tablet Take 1,000 Units by mouth daily     • DULoxetine (CYMBALTA) 60 mg delayed release capsule Take 1 capsule (60 mg total) by mouth in the morning  90 capsule 3   • tolterodine (DETROL) 1 mg tablet Take 1 tablet (1 mg total) by mouth 2 (two) times a day 180 tablet 0     No current facility-administered medications for this visit      _______________________________________________________________________  Review of Systems   Constitutional: Negative for activity change, appetite change, fatigue and unexpected weight change  Respiratory: Negative for cough, chest tightness and shortness of breath      Cardiovascular: Negative for chest pain and palpitations  Gastrointestinal: Negative for abdominal pain, constipation, diarrhea, nausea and vomiting  Genitourinary: Positive for urgency  Negative for difficulty urinating  Musculoskeletal: Positive for arthralgias and gait problem  Skin: Negative for rash  Neurological: Negative for dizziness and headaches  Psychiatric/Behavioral: Positive for dysphoric mood  Negative for decreased concentration, sleep disturbance and suicidal ideas  The patient is nervous/anxious  Objective:  Vitals:    11/29/22 1510   BP: 134/82   BP Location: Right arm   Patient Position: Sitting   Cuff Size: Standard   Pulse: 100   Resp: 16   Temp: 98 9 °F (37 2 °C)   TempSrc: Temporal   SpO2: 97%   Weight: 62 6 kg (138 lb)   Height: 5' (1 524 m)     Body mass index is 26 95 kg/m²  Physical Exam  Vitals and nursing note reviewed  Constitutional:       Appearance: Normal appearance  She is well-developed  Comments: Pt uses walker   HENT:      Head: Normocephalic and atraumatic  Cardiovascular:      Rate and Rhythm: Normal rate and regular rhythm  Heart sounds: Normal heart sounds  No murmur heard  Pulmonary:      Effort: Pulmonary effort is normal  No respiratory distress  Breath sounds: Normal breath sounds  No wheezing  Musculoskeletal:      Cervical back: Normal range of motion and neck supple  Right lower leg: No edema  Left lower leg: No edema  Lymphadenopathy:      Cervical: No cervical adenopathy  Neurological:      Mental Status: She is alert and oriented to person, place, and time  Psychiatric:         Mood and Affect: Mood normal          Behavior: Behavior normal          Thought Content:  Thought content normal          Judgment: Judgment normal

## 2022-11-29 NOTE — ASSESSMENT & PLAN NOTE
Pt has been seeing Psychiatry and last visit was in Nov 2022  Continue cymbalta 60 mg qd and alprazolam 0 25 mg as needed

## 2022-11-29 NOTE — ASSESSMENT & PLAN NOTE
Last dexascan was in Aug 2021 and had slight decrease  Last Prolia shot was in June 2022  Continue calcium 1200 mg qd and Vit D 1000 U qd  Pt also advised to perform weight-bearing exercise on a regular basis  Recheck dexascan in Aug 2023

## 2022-12-20 ENCOUNTER — CLINICAL SUPPORT (OUTPATIENT)
Dept: FAMILY MEDICINE CLINIC | Facility: CLINIC | Age: 67
End: 2022-12-20

## 2022-12-20 DIAGNOSIS — M81.0 AGE RELATED OSTEOPOROSIS, UNSPECIFIED PATHOLOGICAL FRACTURE PRESENCE: Primary | ICD-10-CM

## 2022-12-26 DIAGNOSIS — F41.9 ANXIETY: ICD-10-CM

## 2022-12-27 RX ORDER — ALPRAZOLAM 0.25 MG/1
0.25 TABLET ORAL 3 TIMES DAILY PRN
Qty: 60 TABLET | Refills: 0 | Status: SHIPPED | OUTPATIENT
Start: 2022-12-27

## 2023-02-01 DIAGNOSIS — F41.9 ANXIETY: ICD-10-CM

## 2023-02-01 NOTE — TELEPHONE ENCOUNTER
Shahana Holguin is asking for a refill of Alprazolam  25mg  (insurance changed & has to use different pharmacy)  [facility] in [facility]

## 2023-02-02 RX ORDER — ALPRAZOLAM 0.25 MG/1
0.25 TABLET ORAL 3 TIMES DAILY PRN
Qty: 60 TABLET | Refills: 0 | Status: SHIPPED | OUTPATIENT
Start: 2023-02-02 | End: 2023-02-06 | Stop reason: ALTCHOICE

## 2023-02-06 ENCOUNTER — TELEMEDICINE (OUTPATIENT)
Dept: PSYCHIATRY | Facility: CLINIC | Age: 68
End: 2023-02-06

## 2023-02-06 ENCOUNTER — HOSPITAL ENCOUNTER (OUTPATIENT)
Dept: MAMMOGRAPHY | Facility: HOSPITAL | Age: 68
Discharge: HOME | End: 2023-02-06

## 2023-02-06 VITALS — WEIGHT: 138.01 LBS | HEIGHT: 60 IN | BODY MASS INDEX: 27.09 KG/M2

## 2023-02-06 DIAGNOSIS — Z12.31 ENCOUNTER FOR SCREENING MAMMOGRAM FOR MALIGNANT NEOPLASM OF BREAST: ICD-10-CM

## 2023-02-06 DIAGNOSIS — F43.23 ADJUSTMENT DISORDER WITH MIXED ANXIETY AND DEPRESSED MOOD: Primary | ICD-10-CM

## 2023-02-06 RX ORDER — CLONAZEPAM 0.25 MG/1
0.25 TABLET, ORALLY DISINTEGRATING ORAL 2 TIMES DAILY PRN
Qty: 60 TABLET | Refills: 0 | Status: SHIPPED | OUTPATIENT
Start: 2023-02-06

## 2023-02-06 RX ORDER — BUSPIRONE HYDROCHLORIDE 10 MG/1
TABLET ORAL
Qty: 60 TABLET | Refills: 4 | Status: SHIPPED | OUTPATIENT
Start: 2023-02-06 | End: 2023-07-06

## 2023-02-06 RX ORDER — DULOXETIN HYDROCHLORIDE 60 MG/1
60 CAPSULE, DELAYED RELEASE ORAL DAILY
Qty: 90 CAPSULE | Refills: 3 | Status: SHIPPED | OUTPATIENT
Start: 2023-02-06

## 2023-02-06 NOTE — PSYCH
Virtual Visit Disclaimer:       TeleMed provider: NEETA Marte  Location: at [address] [facility] [address] in [place], [place], [address]    Verification of patient location:     Patient is currently located in the state [facility]  Patient is currently located in a state in which I am licensed     After connecting through televideo, the patient was identified by name and date of birth  Prerna Dasias was informed that this is a telemedicine visit that is being conducted through [address] Now, and the patient was informed that this is a secure, HIPAA-compliant platform  My office door was closed  No one else was in the room  Prerna Cortez acknowledged consent and understanding of privacy and security of the video platform  Jerrod Fu understands that the online visit is based solely on information provided by the patient, and that, in the absence of a face-to-face physical evaluation by the provider, the diagnosis Jerrod Fu  receives is both limited and provisional in terms of accuracy and completeness  Prerna Cortez understands that they can discontinue the visit at any time  I informed Jerrod Fu that I have reviewed their record in EPIC and presented the opportunity for them to ask any questions regarding the visit today  Prerna Cortez voiced understanding and consented to these terms  Jerrod Fu is aware this is a billable service  Virtual visit start and stop times:    Start Time: 1033    Stop Time: 1105    I spent 32 minutes with patient today in which greater than 50% of the time was spent in counseling/coordination of care        Javier Marte 02/06/23   MEDICATION MANAGEMENT NOTE        [facility]      Name and Date of Birth:  Prerna Cortez 79 y o  1955 MRN: [id number]    Date of Visit: February 6, 2023    Reason for Visit:   Chief Complaint   Patient presents with   • Follow-up   • Medication Management   • Depression   • Anxiety         SUBJECTIVE:    Rima Brooks is a 79 y o  female with past psychiatric history significant for Adjustment Disorder and mixed anxiety and depression who was virtually seen and evaluated today at the [address] [place] [address] outpatient clinic for follow-up and medication management  Completes psychiatric assessment without difficulty  Francy Duttonger endorses compliance with psychotropic medication regimen (Cymbalta, Xanax s/p Lamictal)  She denies any current adverse medication side effects  Francy Landrum states that since their previous outpatient psychiatric appointment with this writer, she tolerated discontinuation of Lamictal without any issues  Did decided to go up on her Cymbalta from 60 mg daily to 90 mg daily (chose not to optimize last session), but has felt more depressed  Reports that mood is down and it is taking her a while to get started with her day  Otherwise, still enjoying puppy, eating and sleeping well  Advised to take Cymbalta every other day for 7 days, then every 2 days for 7 days and discontinue 30 mg and resume 60 mg daily  Advised that she can utilize 12 5 mg Benadryl Q6 hrs for withdrawal symptoms from Cymbalta  Discussed "ups and down's" she is experiencing with Xanax  Advised that we will switch her over to Klonopin 0 25 mg BID with a plan to taper off in the future  Advised of the dangers of stopping benzodiazepines abruptly and need for her to not have a lapse in benzodiazepines  Will start Buspar 5 mg BID for 7 days and increase to 10 mg BID and continue for anxiolytic control  Discussed mechanism of action, delayed onset of symptom relief, and side effect profile  Reviewed medication changes at length and patient repeated instructions back to this writer (wrote them down on paper)        During today's examination, Francy Landrum denied feelings of anhedonia, hopelessness, helplessness, worthlessness or guilt and appeared to be future oriented  There was no thought constriction related to death  Denied SI/HI, intent or plan upon direct inquiry at this time  Current Rating Scores:     None completed today  Past Psychiatric History: (unchanged information from previous note copied and italicized) - Information that is bolded has been updated       Past Inpatient Psychiatric Treatment:   No history of past inpatient psychiatric admissions  Past Outpatient Psychiatric Treatment:    Most recently in outpatient psychiatric treatment with a family physician  Past Suicide Attempts: no  Past Violent Behavior: no  Past Psychiatric Medication Trials: Paxil, Cymbalta, Lamictal, Xanax and Melatonin, Klonopin, Buspar     Substance Abuse History: (unchanged information from previous note copied and italicized) - Information that is bolded has been updated       Tobacco/alcohol/caffeine: Denies alcohol use, Denies tobacco use, Caffeine intake: 1 chocolate caffeine candy/day  Illicit drugs: has medical marijuana card for anxiety, did not find it effective   Last use 1 year ago     No past legal actions or arrests secondary to substance intoxication  The patient denies prior DWIs/DUIs  No history of outpatient/inpatient rehabilitation programs  Joanne does not exhibit objective evidence of substance withdrawal during today's examination nor does Joanne appear under the influence of any psychoactive substance        Social History: (unchanged information from previous note copied and italicized) - Information that is bolded has been updated       Developmental: Denies a history of milestone/developmental delay  Denies exposure to in-utero substances  There is no documented history of IEP or need for special education    Education: some college  Marital history:   Children: 2 children (ages 28 and 40)  Living arrangement, social support:   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[employer], now retired and on disability  Access to firearms: Has direct access to weapons/firearms, locked in safe  Joanne Espinal has no history of arrests or violence with a deadly weapon       Traumatic History: (unchanged information from previous note copied and italicized) - Information that is bolded has been updated       Abuse: no history of physical or sexual abuse  Other Traumatic Events: none       Past Medical History:    Past Medical History:   Diagnosis Date   • Anxiety    • Scoliosis    • Skin cancer         Past Surgical History:   Procedure Laterality Date   • BACK SURGERY  1974    velarde damon in back for scoliosis   • COLONOSCOPY  01/01/2013   • ELBOW FRACTURE REPAIR Left 8/11/2020    Procedure: ORIF left humeral shaft, ulnar nerve release, tenotomy anconeus epitrochlearis, radial nerve dissection;  Surgeon: Noris Mccord MD;  Location: [facility];  Service: Orthopedics   • JOINT REPLACEMENT      left hip and knee   • KIDNEY STONE SURGERY       Allergies   Allergen Reactions   • Macrobid [Nitrofurantoin] Other (See Comments)     Pt does not remember what reaction was       Substance Abuse History:    Social History     Substance and Sexual Activity   Alcohol Use Yes     Social History     Substance and Sexual Activity   Drug Use Yes   • Types: Marijuana    Comment: Medical Marijuana       Social History:    Social History     Socioeconomic History   • Marital status: /Civil Union     Spouse name: Not on file   • Number of children: Not on file   • Years of education: Not on file   • Highest education level: Not on file   Occupational History   • Not on file   Tobacco Use   • Smoking status: Never   • Smokeless tobacco: Never   Vaping Use   • Vaping Use: Never used   Substance and Sexual Activity   • Alcohol use:  Yes   • Drug use: Yes     Types: Marijuana     Comment: Medical Marijuana   • Sexual activity: Not Currently     Partners: Male   Other Topics Concern   • Not on file   Social History Narrative    Abnormal MMG:   No Abnormal Pap:   No       Date of last pap smear:         PAP/HPV-Neg     Do you have a discharge:   No      Menses monthly:   No      Age at first child:   27      Age at menarche:   25      Current birth control method:   Menopause      History of Abnormal Pap Smear:   No      STD/PID:   No      If post menopausal, age at menopause:   46      Abnormal Pap:   No      Sexually Active:   Yes      Sexual Partners:   Male      Date of Last Colonoscopy:       due in 2019     History of ovarian cysts:   No      Date of last mammogram:   2018    Total:   2      Full term:   2      Livin     1 boy, 1 girl     Tobacco smoking status:   Never smoker      Smoking - how much:   None     Most recent tobacco use screenin2019      Do you currently or have you served in the [employer]:   No      Were you activated, into active duty, as a member of the [email or web address] or as a Reservist:   No      Occupation:   retired      Education:   15      some college     Marital status:         Sexual orientation:   Heterosexual      Exercise level:   Occasional      Diet:   Regular       General stress level:   Medium      Alcohol intake:   Occasional       Caffeine intake:   Occasional      Illicit drugs:   denies      Seat belts used routinely:   Yes      Sunscreen used routinely:   Yes      Live alone or with others:   with others      History of Physical, Emotional or Sexual Abuse:   No      Presence of domestic violence:   No      Sexually active:   Yes       Do you feel safe at home:    Yes                   Social Determinants of Health     Financial Resource Strain: Not on file   Food Insecurity: Not on file   Transportation Needs: Not on file   Physical Activity: Not on file   Stress: Not on file   Social Connections: Not on file   Intimate Partner Violence: Not on file   Housing Stability: Not on file       Family Psychiatric History:     Family History   Problem Relation Age of Onset   • Skin cancer Mother    • Hypertension Father    • Colon cancer Maternal Uncle         unknown age-maybe n his 62s   • No Known Problems Daughter    • No Known Problems Maternal Grandmother    • No Known Problems Maternal Grandfather    • No Known Problems Paternal Grandmother    • No Known Problems Paternal Grandfather    • No Known Problems Son    • No Known Problems Brother        History Review: The following portions of the patient's history were reviewed and updated as appropriate: allergies, current medications and past social history           OBJECTIVE:     Vital signs in last 24 hours:    Vitals:       Mental Status Evaluation:    Appearance age appropriate, casually dressed   Behavior cooperative, calm   Speech normal rate, normal volume, normal pitch   Mood depressed, anxious   Affect normal range and intensity, appropriate   Thought Processes organized, goal directed   Associations intact associations   Thought Content no overt delusions   Perceptual Disturbances: no auditory hallucinations, no visual hallucinations   Abnormal Thoughts  Risk Potential Suicidal ideation - None  Homicidal ideation - None  Potential for aggression - No   Orientation oriented to: person, place, time/date and situation   Memory recent and remote memory grossly intact   Consciousness alert and awake   Attention Span Concentration Span attention span and concentration are age appropriate   Intellect appears to be of average intelligence   Insight intact   Judgement intact   Muscle Strength and  Gait unable to assess today due to virtual visit   Motor activity no abnormal movements   Language no difficulty naming common objects, no difficulty repeating a phrase, no difficulty writing a sentence   Fund of Knowledge adequate knowledge of current events  adequate fund of knowledge regarding past history  adequate fund of knowledge regarding vocabulary    Pain none   Pain Scale 0       Laboratory Results: I have personally reviewed all pertinent laboratory/tests results    Lethality Statement:    Based on today's assessment and clinical criteria, Adelina Law contracts for safety and is not an imminent risk of harm to self or others  Outpatient level of care is deemed appropriate at this current time  Quinton Rivera understands that if they can no longer contract for safety, they need to call the office or report to their nearest Emergency Room for immediate evaluation      Assessment/Plan:     In summary, Joanne Espinal is a 79 y o   female, , domiciled with , retired, w/ PMH of osteoperosis and osteoarthritis and PPH of depression and anxiety, no prior psychiatric admissions, no prior SA, no h/o self-injurious behavior, who presented to the mental health clinic for the initial intake and psychiatric evaluation on October 7, 2022   Joanne was referred to the clinic by PCP, Dr Arik Syed Cymbalta 60 mg QD, Lamictal 25 mg QD, and Xanax 0 25 mg TID PRN   Tolerating medication well with no medication side effects observed or reported   Not actively involved in individual psychotherapy   Reports that symptoms of anxiety and depression were first noticed in the 1960's after having children   Started on psychotropic medications by PCP after fracturing hip in 2015   Initially on Paxil with good result and no side effects   However was switched to Cymbalta later secondary to being a newer agent with multiple indications   Xanax started shortly after   Has been maintained on this combination for several years   During this time, Quinton Rivera has struggled with multiple fractures/injuries with mood remaining somewhat baseline until 2020   Several consecutive stressors led to increased anxiety and depressive symptoms   Primary stressor was suffering from fall which resulted in significant injury/surgery to left arm   Participated in physical therapy with disappointing results   Feels that mobility is difficult and has since become more withdrawn   No longer able to visit grandchildren via commercial flight alone   No longer participates in weekly knitting group with friends   PCP initiated on Lamictal 25 mg daily and placed referral to psychiatry   PHQ-9 score: 10; MAUREEN-7 score: 9   Her current presentation meets criteria for Adjustment disorder with mixed anxiety and depressed mood        Past PHQ-9 scores: 10,4  Past MAUREEN-7 scores: 9,3     Psychopharmacologically, Jerrod Fu had tolerated titration off Lamictal without issue  Did attempt to increase her dose of Cymbalta from 60 to 90 mg daily for approximately 1 month which led to an increase in depression  Will taper back down to 60 mg by taking 30 mg every other day for 7 days, then every 2 days for 7 days then off  Will switch Xanax 0 25 BID to Klonopin 0 25 BID with goal to taper off in future  Will start Buspar 5 mg BID for 7 days and increase to 10 mg and continue for anxiolytic control  Patient wrote down all instructions, reiterated instructions to this provider, and verbalized understanding and agreement with plan  Risks/benefits/alternativies to treatment discussed, including a myriad of potential adverse medication side effects, to which Jerrod Fu voiced understanding and consented fully to treatment  Also, patient is amenable to calling/contacting the outpatient office including this writer if any acute adverse effects of their medication regimen arise in addition to any comments or concerns pertaining to their psychiatric management  Plan:  1  Taper down Cymbalta from 90 mg daily to 60 mg daily for depression and anxiety  Take extra 30 mg every other day for 7 days, then every 2 days for 7 days, then discontinue 30 mg capsule and continue with 60 mg daily  2  Start Buspar 5 mg BID for 7 days then increase to 10 mg BID and continue for anxiety  3   Stop Xanax 0 25 mg TID (only takes BID) and start Klonopin 0 25 mg BID for anxiety with goal to taper off in future  4  Psychotherapy- declines at this time  5  Follow up with primary care provider for ongoing medical care  6  Follow up with this provider in 2 months     Diagnoses and all orders for this visit:    Adjustment disorder with mixed anxiety and depressed mood  -     clonazePAM (KlonoPIN) 0 25 MG disintegrating tablet; Take 1 tablet (0 25 mg total) by mouth 2 (two) times a day as needed for anxiety  -     busPIRone (BUSPAR) 10 mg tablet; Take 0 5 tablets (5 mg total) by mouth 2 (two) times a day for 7 days, THEN 1 tablet (10 mg total) 2 (two) times a day  -     DULoxetine (CYMBALTA) 60 mg delayed release capsule; Take 1 capsule (60 mg total) by mouth daily           - Psychoeducation provided regarding the importance of exercise and health dietary choices and their impact on mood, energy, and motivation   - Counseled to avoid ETOH, illicit substances, and nicotine secondary to the detrimental effects of these substances on mental and physical health  - Encouraged to engage in non-verbal forms of therapy such as art therapy, music therapy, and mindfulness  Aware of 24 hour and weekend coverage for urgent situations accessed by calling [facility] main practice number    Medications Risks/Benefits      Risks, Benefits And Possible Side Effects Of Medications:    Risks, benefits, and possible side effects of medications explained to [facility] including risk of suicidality and serotonin syndrome related to treatment with antidepressants and risks of misuse, abuse or dependence, sedation and respiratory depression related to treatment with benzodiazepine medications  She verbalizes understanding and agreement for treatment      Controlled Medication Discussion:     [facility] has been filling controlled prescriptions on time as prescribed according to [facility]  Discussed with [facility] the risks of sedation, respiratory depression, impairment of ability to drive and potential for abuse and addiction related to treatment with benzodiazepine medications  She understands risk of treatment with benzodiazepine medications, agrees to not drive if feels impaired and agrees to take medications as prescribed    Psychotherapy Provided:     Individual psychotherapy provided: Yes  Counseling was provided during the session today for 16 minutes  Medication education provided to [facility] discussed during session  Importance of medication and treatment compliance reviewed with Briana Feng  Cognitive therapy was utilized during the session  Reassurance and supportive therapy provided  Crisis/safety plan discussed with Yosef Police     Treatment Plan:    Completed and signed during the session: Not applicable - Treatment Plan not due at this session    Visit Time    Visit Start Time: 10:33 AM  Visit Stop Time: 11:05 AM  Total Visit Duration: 32 minutes    Ana Cespedes, [address] 02/06/23    This note was completed in part utilizing Chrystal Pacheco  65  Grammatical, translation, syntax errors, random word insertions, spelling mistakes, and incomplete sentences may be an occasional consequence of this system secondary to software limitations with voice recognition, ambient noise, and hardware issues  If you have any questions or concerns about the content, text, or information contained within the body of this dictation, please contact the provider for clarification

## 2023-02-07 ENCOUNTER — TELEPHONE (OUTPATIENT)
Dept: PSYCHIATRY | Facility: CLINIC | Age: 68
End: 2023-02-07

## 2023-02-07 NOTE — TELEPHONE ENCOUNTER
Patient called the office regarding her Buspar  She would like to know how long after taking the first tablet should she wait to take the second tablet  Informed her MR would send message and office would call her back with response  Please advise, thank you

## 2023-02-07 NOTE — TELEPHONE ENCOUNTER
Spoke with Ashli Pritchett  Advised to take one in the morning and one in the evening  She stated she is not a morning person and took it about 11 am  Advised typically 10-12 hour after morning dose  She verbalized understanding         FELICE

## 2023-02-21 ENCOUNTER — TELEPHONE (OUTPATIENT)
Dept: PSYCHIATRY | Facility: CLINIC | Age: 68
End: 2023-02-21

## 2023-02-21 DIAGNOSIS — F43.23 ADJUSTMENT DISORDER WITH MIXED ANXIETY AND DEPRESSED MOOD: Primary | ICD-10-CM

## 2023-02-21 RX ORDER — ALPRAZOLAM 0.25 MG/1
0.25 TABLET ORAL 2 TIMES DAILY PRN
Qty: 30 TABLET | Refills: 0 | Status: SHIPPED | OUTPATIENT
Start: 2023-02-21

## 2023-02-21 NOTE — TELEPHONE ENCOUNTER
Lele Schuster requested a call back to discuss medication  She stated that clonazepam make her feel dizzy and she hasn't take the medication since Saturday  She asked if it is possible that she go back to xanax  They can be reached at P# [phone]  Thank you

## 2023-02-21 NOTE — TELEPHONE ENCOUNTER
Miryam Jeff  Reports that she had taken a total of 3 doses of Klonopin which resulted in dizziness and "not feeling right"  Has not taken any Klonopin since Saturday  Reports that she does have Xanax at home  Advised that she should continue with Xanax as directed prior to switch (as stopping medication could result in seizures) and will discuss any changes at next appointment  Verbalized understanding  No other concerns at this time

## 2023-02-28 DIAGNOSIS — N39.41 URGE INCONTINENCE OF URINE: ICD-10-CM

## 2023-02-28 RX ORDER — TOLTERODINE TARTRATE 1 MG/1
1 TABLET, EXTENDED RELEASE ORAL 2 TIMES DAILY
Qty: 180 TABLET | Refills: 0 | Status: SHIPPED | OUTPATIENT
Start: 2023-02-28

## 2023-02-28 NOTE — TELEPHONE ENCOUNTER
Patient needs refill on tolterodine (DETROL) 1 mg tablet  [facility]  [phone]

## 2023-03-03 ENCOUNTER — TELEPHONE (OUTPATIENT)
Dept: FAMILY MEDICINE CLINIC | Facility: CLINIC | Age: 68
End: 2023-03-03

## 2023-03-03 ENCOUNTER — TELEPHONE (OUTPATIENT)
Dept: PSYCHIATRY | Facility: CLINIC | Age: 68
End: 2023-03-03

## 2023-03-03 NOTE — TELEPHONE ENCOUNTER
Patient called to update insurance  As of 12/11/2022 her Amanda espinoza is no longer active as her  has retired      Patient has Medicare A and B and now has added Plan F coverage Plan # [id number]    Medicare Rx [employer] # [id number]

## 2023-03-03 NOTE — TELEPHONE ENCOUNTER
Patient needs a prior auth on detrol'    Patient called stating [facility] on [address] wont fill her medication without it

## 2023-03-13 DIAGNOSIS — F43.23 ADJUSTMENT DISORDER WITH MIXED ANXIETY AND DEPRESSED MOOD: ICD-10-CM

## 2023-03-13 RX ORDER — ALPRAZOLAM 0.25 MG/1
0.25 TABLET ORAL 3 TIMES DAILY PRN
Qty: 90 TABLET | Refills: 0 | Status: SHIPPED | OUTPATIENT
Start: 2023-03-13

## 2023-03-13 NOTE — TELEPHONE ENCOUNTER
Writer called and left a message for patient to call office back regarding change in insurance  Patient will need to send a copy of card via my chart or by e-mail

## 2023-03-29 ENCOUNTER — RA CDI HCC (OUTPATIENT)
Dept: OTHER | Facility: HOSPITAL | Age: 68
End: 2023-03-29

## 2023-04-04 ENCOUNTER — OFFICE VISIT (OUTPATIENT)
Dept: FAMILY MEDICINE CLINIC | Facility: CLINIC | Age: 68
End: 2023-04-04

## 2023-04-04 VITALS
WEIGHT: 138 LBS | SYSTOLIC BLOOD PRESSURE: 132 MMHG | TEMPERATURE: 99 F | HEIGHT: 60 IN | BODY MASS INDEX: 27.09 KG/M2 | DIASTOLIC BLOOD PRESSURE: 78 MMHG | OXYGEN SATURATION: 97 % | HEART RATE: 113 BPM

## 2023-04-04 DIAGNOSIS — Z00.00 MEDICARE ANNUAL WELLNESS VISIT, SUBSEQUENT: Primary | ICD-10-CM

## 2023-04-04 DIAGNOSIS — N39.41 URGE INCONTINENCE OF URINE: ICD-10-CM

## 2023-04-04 DIAGNOSIS — R73.01 IMPAIRED FASTING GLUCOSE: ICD-10-CM

## 2023-04-04 DIAGNOSIS — E55.9 VITAMIN D DEFICIENCY: ICD-10-CM

## 2023-04-04 DIAGNOSIS — F43.23 ACUTE ADJUSTMENT DISORDER WITH MIXED ANXIETY AND DEPRESSED MOOD: ICD-10-CM

## 2023-04-04 DIAGNOSIS — M81.0 AGE RELATED OSTEOPOROSIS, UNSPECIFIED PATHOLOGICAL FRACTURE PRESENCE: ICD-10-CM

## 2023-04-04 LAB — SL AMB POCT HEMOGLOBIN AIC: 5.8 (ref ?–6.5)

## 2023-04-04 RX ORDER — AMOXICILLIN 400 MG/5ML
POWDER, FOR SUSPENSION ORAL
COMMUNITY
Start: 2023-01-27

## 2023-04-04 NOTE — PROGRESS NOTES
Assessment and Plan:     Problem List Items Addressed This Visit        Endocrine    Impaired fasting glucose     A1C done today and was 5 8  Advised pt to follow a low carb diet and to exercise on a regular basis  Relevant Orders    POCT hemoglobin A1c       Musculoskeletal and Integument    Age related osteoporosis     Last dexascan was in Aug 2021 and had slight decrease  Last Prolia shot was in Dec 2022  Continue calcium 1200 mg qd and Vit D 1000 U qd  Pt also advised to perform weight-bearing exercise on a regular basis  Recheck dexascan in Aug 2023  Relevant Orders    DXA bone density spine hip and pelvis       Other    Vitamin D deficiency     Level 30 9 in Sept 2022  Continue 2000 U qd  Urge incontinence of urine     Stable  Need to change med due to insurance  Relevant Medications    Mirabegron ER 25 MG TB24    Medicare annual wellness visit, subsequent - Primary     Wellness exam done  Had Tdap in 2016  Had flu shot, COVID vaccines/booster, prevnar 13, pneumovacc 23, and Shingrix series  Labs are UTD  Had mammogram in Feb 2023 and dexascan in Aug 2021  Had colonoscopy in 2019  No recent falls  Mood ok on meds  Acute adjustment disorder with mixed anxiety and depressed mood     Stable  Pt seeing Psychiatry and next visit is in April 2023  Continue cymbalta 60 mg qd, buspar 10 mg bid, and alprazolam 0 25 mg as needed  BMI Counseling: Body mass index is 26 95 kg/m²  The BMI is above normal  Nutrition recommendations include decreasing portion sizes, encouraging healthy choices of fruits and vegetables, consuming healthier snacks and moderation in carbohydrate intake  Exercise recommendations include exercising 3-5 times per week  No pharmacotherapy was ordered  Rationale for BMI follow-up plan is due to patient being overweight or obese         Preventive health issues were discussed with patient, and age appropriate screening tests were ordered as noted in patient's After Visit Summary  Personalized health advice and appropriate referrals for health education or preventive services given if needed, as noted in patient's After Visit Summary  History of Present Illness:     Patient presents for a Medicare Wellness Visit    Pt here for f/u Osteoporosis, Vit D def, Impaired Fasting Glucose, Urge incontinence, Anxiety/Depression  Pt also due for wellness exam  Doing ok  No cp/sob  Gets headaches daily  Taking Ca and vit D  Needs to change med for incontinence due to insurance  Pt has been seeing Psych and doing ok  Mood has been ok  Still anxious  Has been taking buspar 10 mg bid but no change  No new c/o  Patient Care Team:  Flynn Pascal MD as PCP - General (Family Medicine)     Review of Systems:     Review of Systems   Constitutional: Negative for activity change, appetite change, fatigue and unexpected weight change  Respiratory: Negative for cough, chest tightness and shortness of breath  Cardiovascular: Negative for chest pain and palpitations  Gastrointestinal: Negative for abdominal pain, constipation, diarrhea, nausea and vomiting  Genitourinary: Positive for urgency  Negative for difficulty urinating  Musculoskeletal: Positive for arthralgias and gait problem  Skin: Negative for rash  Neurological: Negative for dizziness and headaches  Psychiatric/Behavioral: Positive for dysphoric mood  Negative for decreased concentration, sleep disturbance and suicidal ideas  The patient is nervous/anxious           Problem List:     Patient Active Problem List   Diagnosis   • Closed bilateral fracture of pubic rami (HCC)   • Acute adjustment disorder with mixed anxiety and depressed mood   • Urge incontinence of urine   • Vitamin D deficiency   • Osteoarthritis   • Age related osteoporosis   • Postoperative acute deep vein thrombosis (DVT) of lower extremity (HCC)   • History of kidney stones   • Status post total left knee replacement   • Closed fracture of distal end of humerus   • Abnormal CT scan   • Fracture of left humerus   • Impaired fasting glucose   • Medicare annual wellness visit, subsequent   • Chronic right shoulder pain   • Weakness of right lower extremity      Past Medical and Surgical History:     Past Medical History:   Diagnosis Date   • Anxiety    • Scoliosis    • Skin cancer      Past Surgical History:   Procedure Laterality Date   • BACK SURGERY  1974    velarde damon in back for scoliosis   • COLONOSCOPY  01/01/2013   • ELBOW FRACTURE REPAIR Left 8/11/2020    Procedure: ORIF left humeral shaft, ulnar nerve release, tenotomy anconeus epitrochlearis, radial nerve dissection;  Surgeon: Kyle Levin MD;  Location: [facility];  Service: Orthopedics   • JOINT REPLACEMENT      left hip and knee   • KIDNEY STONE SURGERY        Family History:     Family History   Problem Relation Age of Onset   • Skin cancer Mother    • Hypertension Father    • Colon cancer Maternal Uncle         unknown age-maybe n his 62s   • No Known Problems Daughter    • No Known Problems Maternal Grandmother    • No Known Problems Maternal Grandfather    • No Known Problems Paternal Grandmother    • No Known Problems Paternal Grandfather    • No Known Problems Son    • No Known Problems Brother       Social History:     Social History     Socioeconomic History   • Marital status: /Civil Union     Spouse name: None   • Number of children: None   • Years of education: None   • Highest education level: None   Occupational History   • None   Tobacco Use   • Smoking status: Never   • Smokeless tobacco: Never   Vaping Use   • Vaping Use: Never used   Substance and Sexual Activity   • Alcohol use: Yes     Comment: occasional   • Drug use: Yes     Types: Marijuana     Comment: Medical Marijuana   • Sexual activity: Not Currently     Partners: Male   Other Topics Concern   • None   Social History Narrative    Abnormal MMG:   No      Abnormal Pap:   No       Date of last pap smear:   2016      PAP/HPV-Neg     Do you have a discharge:   No      Menses monthly:   No      Age at first child:   27      Age at menarche:   25      Current birth control method:   Menopause      History of Abnormal Pap Smear:   No      STD/PID:   No      If post menopausal, age at menopause:   46      Abnormal Pap:   No      Sexually Active:   Yes      Sexual Partners:   Male      Date of Last Colonoscopy:       due in 2019     History of ovarian cysts:   No      Date of last mammogram:   2018    Total:   2      Full term:   2      Livin     1 boy, 1 girl     Tobacco smoking status:   Never smoker      Smoking - how much:   None     Most recent tobacco use screenin2019      Do you currently or have you served in the [email or web address]:   No      Were you activated, into active duty, as a member of the [employer] or as a Reservist:   No      Occupation:   retired      Education:   15      some college     Marital status:         Sexual orientation:   Heterosexual      Exercise level:   Occasional      Diet:   Regular       General stress level:   Medium      Alcohol intake:   Occasional       Caffeine intake:   Occasional      Illicit drugs:   denies      Seat belts used routinely:   Yes      Sunscreen used routinely:   Yes      Live alone or with others:   with others      History of Physical, Emotional or Sexual Abuse:   No      Presence of domestic violence:   No      Sexually active:   Yes       Do you feel safe at home: Yes                   Social Determinants of Health     Financial Resource Strain: Low Risk    • Difficulty of Paying Living Expenses: Not very hard   Food Insecurity: Not on file   Transportation Needs: No Transportation Needs   • Lack of Transportation (Medical): No   • Lack of Transportation (Non-Medical):  No   Physical Activity: Not on file   Stress: Not on file   Social Connections: Not on file   Intimate Partner Violence: Not on file Housing Stability: Not on file      Medications and Allergies:     Current Outpatient Medications   Medication Sig Dispense Refill   • ALPRAZolam (XANAX) 0 25 mg tablet Take 1 tablet (0 25 mg total) by mouth 3 (three) times a day as needed for anxiety 90 tablet 0   • amoxicillin (AMOXIL) 400 MG/5ML suspension TAKE 25ML BY MOUTH 1 HOUR BEFORE APPOINTMENT  • busPIRone (BUSPAR) 10 mg tablet Take 0 5 tablets (5 mg total) by mouth 2 (two) times a day for 7 days, THEN 1 tablet (10 mg total) 2 (two) times a day  60 tablet 4   • cholecalciferol (VITAMIN D3) 1,000 units tablet Take 1,000 Units by mouth daily     • DULoxetine (CYMBALTA) 60 mg delayed release capsule Take 1 capsule (60 mg total) by mouth daily 90 capsule 3   • Mirabegron ER 25 MG TB24 Take 25 mg by mouth in the morning 90 tablet 2     No current facility-administered medications for this visit       Allergies   Allergen Reactions   • Macrobid [Nitrofurantoin] Other (See Comments)     Pt does not remember what reaction was      Immunizations:     Immunization History   Administered Date(s) Administered   • COVID-19 PFIZER VACCINE 0 3 ML IM 02/19/2021, 03/12/2021, 11/19/2021   • COVID-19 Pfizer Vac BIVALENT Hector-sucrose 12 Yr+ IM (BOOSTER ONLY) 03/07/2023   • COVID-19 Pfizer vac (Hector-sucrose, gray cap) 12 yr+ IM 04/05/2022   • INFLUENZA 11/01/2011, 11/28/2012, 10/22/2013, 10/06/2014, 10/20/2015, 09/19/2016, 01/15/2018, 09/27/2018   • Influenza Injectable, MDCK, Preservative Free, Quadrivalent, 0 5 mL 10/24/2019   • Influenza, high dose seasonal 0 7 mL 09/14/2020, 10/28/2022   • Influenza, recombinant, quadrivalent,injectable, preservative free 09/16/2021   • Pneumococcal Conjugate 13-Valent 09/14/2020   • Pneumococcal Polysaccharide PPV23 02/10/2014, 01/20/2022   • Tdap 08/01/2006, 11/02/2016   • Zoster 09/22/2017   • Zoster Vaccine Recombinant 03/08/2019, 06/17/2019      Health Maintenance:         Topic Date Due   • Hepatitis C Screening  Never done   • Breast Cancer Screening: Mammogram  02/06/2024   • Colorectal Cancer Screening  07/29/2026     There are no preventive care reminders to display for this patient  Medicare Screening Tests and Risk Assessments:     Naomi Jo is here for her Subsequent Wellness visit  Health Risk Assessment:   Patient rates overall health as good  Patient feels that their physical health rating is slightly better  Patient is satisfied with their life  Eyesight was rated as same  Hearing was rated as same  Patient feels that their emotional and mental health rating is same  Patients states they are sometimes angry  Patient states they are often unusually tired/fatigued  Pain experienced in the last 7 days has been some  Patient's pain rating has been 4/10  Patient states that she has experienced no weight loss or gain in last 6 months  Fall Risk Screening: In the past year, patient has experienced: history of falling in past year    Number of falls: 1  Injured during fall?: No    Feels unsteady when standing or walking?: Yes    Worried about falling?: Yes      Urinary Incontinence Screening:   Patient has leaked urine accidently in the last six months  Home Safety:  Patient has trouble with stairs inside or outside of their home  Patient has working smoke alarms and has working carbon monoxide detector  Home safety hazards include: none  Nutrition:   Current diet is Low Carb and Regular  Medications:   Patient is currently taking over-the-counter supplements  OTC medications include: see medication list  Patient is able to manage medications  Activities of Daily Living (ADLs)/Instrumental Activities of Daily Living (IADLs):   Walk and transfer into and out of bed and chair?: Yes  Dress and groom yourself?: Yes    Bathe or shower yourself?: Yes    Feed yourself?  Yes  Do your laundry/housekeeping?: Yes  Manage your money, pay your bills and track your expenses?: Yes  Make your own meals?: Yes    Do your own shopping?: Yes    Previous Hospitalizations:   Any hospitalizations or ED visits within the last 12 months?: No      Advance Care Planning:   Living will: No    Durable POA for healthcare: No    Advanced directive: No    Advanced directive counseling given: Yes    Five wishes given: Yes    Patient declined ACP directive: No    End of Life Decisions reviewed with patient: No    Provider agrees with end of life decisions: No      Cognitive Screening:   Provider or family/friend/caregiver concerned regarding cognition?: No    PREVENTIVE SCREENINGS      Cardiovascular Screening:    General: Screening Current      Diabetes Screening:     General: Screening Current      Colorectal Cancer Screening:     General: Screening Current      Breast Cancer Screening:     General: Screening Current      Cervical Cancer Screening:    General: Screening Not Indicated      Osteoporosis Screening:    General: Screening Not Indicated and History Osteoporosis      Abdominal Aortic Aneurysm (AAA) Screening:        General: Screening Not Indicated      Lung Cancer Screening:     General: Screening Not Indicated    Screening, Brief Intervention, and Referral to Treatment (SBIRT)    Screening  Typical number of drinks in a day: 1  Typical number of drinks in a week: 2  Interpretation: Low risk drinking behavior  AUDIT-C Screenin) How often did you have a drink containing alcohol in the past year? monthly or less  2) How many drinks did you have on a typical day when you were drinking in the past year? 1 to 2  3) How often did you have 6 or more drinks on one occasion in the past year? never    AUDIT-C Score: 1  Interpretation: Score 0-2 (female): Negative screen for alcohol misuse    Brief Intervention  Alcohol & drug use screenings were reviewed  No concerns regarding substance use disorder identified  No results found       Physical Exam:     /78 (BP Location: Left arm, Patient Position: Sitting, Cuff Size: Standard) Pulse (!) 113   Temp 99 °F (37 2 °C) (Tympanic)   Ht 5' (1 524 m)   Wt 62 6 kg (138 lb)   SpO2 97%   BMI 26 95 kg/m²     Physical Exam  Vitals and nursing note reviewed  Constitutional:       General: She is not in acute distress  Appearance: Normal appearance  She is well-developed  Comments: Uses walker   Neck:      Vascular: No carotid bruit  Cardiovascular:      Rate and Rhythm: Normal rate and regular rhythm  Heart sounds: No murmur heard  Pulmonary:      Effort: Pulmonary effort is normal  No respiratory distress  Breath sounds: Normal breath sounds  Musculoskeletal:      Cervical back: Normal range of motion and neck supple  Right lower leg: No edema  Left lower leg: No edema  Lymphadenopathy:      Cervical: No cervical adenopathy  Neurological:      Mental Status: She is alert  Psychiatric:         Mood and Affect: Mood normal          Behavior: Behavior normal          Thought Content:  Thought content normal          Judgment: Judgment normal           Tiff Cortes MD

## 2023-04-04 NOTE — ASSESSMENT & PLAN NOTE
Last dexascan was in Aug 2021 and had slight decrease  Last Prolia shot was in Dec 2022  Continue calcium 1200 mg qd and Vit D 1000 U qd  Pt also advised to perform weight-bearing exercise on a regular basis  Recheck dexascan in Aug 2023

## 2023-04-04 NOTE — ASSESSMENT & PLAN NOTE
Wellness exam done  Had Tdap in 2016  Had flu shot, COVID vaccines/booster, prevnar 13, pneumovacc 23, and Shingrix series  Labs are UTD  Had mammogram in Feb 2023 and dexascan in Aug 2021  Had colonoscopy in 2019  No recent falls  Mood ok on meds

## 2023-04-04 NOTE — PATIENT INSTRUCTIONS
Medicare Preventive Visit Patient Instructions  Thank you for completing your Welcome to Medicare Visit or Medicare Annual Wellness Visit today  Your next wellness visit will be due in one year (4/4/2024)  The screening/preventive services that you may require over the next 5-10 years are detailed below  Some tests may not apply to you based off risk factors and/or age  Screening tests ordered at today's visit but not completed yet may show as past due  Also, please note that scanned in results may not display below  Preventive Screenings:  Service Recommendations Previous Testing/Comments   Colorectal Cancer Screening  * Colonoscopy    * Fecal Occult Blood Test (FOBT)/Fecal Immunochemical Test (FIT)  * Fecal DNA/Cologuard Test  * Flexible Sigmoidoscopy Age: 39-70 years old   Colonoscopy: every 10 years (may be performed more frequently if at higher risk)  OR  FOBT/FIT: every 1 year  OR  Cologuard: every 3 years  OR  Sigmoidoscopy: every 5 years  Screening may be recommended earlier than age 39 if at higher risk for colorectal cancer  Also, an individualized decision between you and your healthcare provider will decide whether screening between the ages of 74-80 would be appropriate  Colonoscopy: 07/29/2019  FOBT/FIT: Not on file  Cologuard: Not on file  Sigmoidoscopy: Not on file    Screening Current     Breast Cancer Screening Age: 36 years old  Frequency: every 1-2 years  Not required if history of left and right mastectomy Mammogram: 02/06/2023    Screening Current   Cervical Cancer Screening Between the ages of 21-29, pap smear recommended once every 3 years  Between the ages of 33-67, can perform pap smear with HPV co-testing every 5 years     Recommendations may differ for women with a history of total hysterectomy, cervical cancer, or abnormal pap smears in past  Pap Smear: 08/08/2022    Screening Not Indicated   Hepatitis C Screening Once for adults born between 1945 and 1965  More frequently in patients at high risk for Hepatitis C Hep C Antibody: Not on file        Diabetes Screening 1-2 times per year if you're at risk for diabetes or have pre-diabetes Fasting glucose: No results in last 5 years (No results in last 5 years)  A1C: 5 8 % (9/21/2022)  Screening Current   Cholesterol Screening Once every 5 years if you don't have a lipid disorder  May order more often based on risk factors  Lipid panel: 10/14/2021    Screening Current     Other Preventive Screenings Covered by Medicare:  1  Abdominal Aortic Aneurysm (AAA) Screening: covered once if your at risk  You're considered to be at risk if you have a family history of AAA  2  Lung Cancer Screening: covers low dose CT scan once per year if you meet all of the following conditions: (1) Age 50-69; (2) No signs or symptoms of lung cancer; (3) Current smoker or have quit smoking within the last 15 years; (4) You have a tobacco smoking history of at least 20 pack years (packs per day multiplied by number of years you smoked); (5) You get a written order from a healthcare provider  3  Glaucoma Screening: covered annually if you're considered high risk: (1) You have diabetes OR (2) Family history of glaucoma OR (3)  aged 48 and older OR (3)  American aged 72 and older  3  Osteoporosis Screening: covered every 2 years if you meet one of the following conditions: (1) You're estrogen deficient and at risk for osteoporosis based off medical history and other findings; (2) Have a vertebral abnormality; (3) On glucocorticoid therapy for more than 3 months; (4) Have primary hyperparathyroidism; (5) On osteoporosis medications and need to assess response to drug therapy  · Last bone density test (DXA Scan): 08/25/2021   5  HIV Screening: covered annually if you're between the age of 15-65  Also covered annually if you are younger than 13 and older than 72 with risk factors for HIV infection   For pregnant patients, it is covered up to 3 times per pregnancy  Immunizations:  Immunization Recommendations   Influenza Vaccine Annual influenza vaccination during flu season is recommended for all persons aged >= 6 months who do not have contraindications   Pneumococcal Vaccine   * Pneumococcal conjugate vaccine = PCV13 (Prevnar 13), PCV15 (Vaxneuvance), PCV20 (Prevnar 20)  * Pneumococcal polysaccharide vaccine = PPSV23 (Pneumovax) Adults 25-60 years old: 1-3 doses may be recommended based on certain risk factors  Adults 72 years old: 1-2 doses may be recommended based off what pneumonia vaccine you previously received   Hepatitis B Vaccine 3 dose series if at intermediate or high risk (ex: diabetes, end stage renal disease, liver disease)   Tetanus (Td) Vaccine - COST NOT COVERED BY MEDICARE PART B Following completion of primary series, a booster dose should be given every 10 years to maintain immunity against tetanus  Td may also be given as tetanus wound prophylaxis  Tdap Vaccine - COST NOT COVERED BY MEDICARE PART B Recommended at least once for all adults  For pregnant patients, recommended with each pregnancy  Shingles Vaccine (Shingrix) - COST NOT COVERED BY MEDICARE PART B  2 shot series recommended in those aged 48 and above     Health Maintenance Due:      Topic Date Due   • Hepatitis C Screening  Never done   • Breast Cancer Screening: Mammogram  02/06/2024   • Colorectal Cancer Screening  07/29/2026     Immunizations Due:      Topic Date Due   • COVID-19 Vaccine (5 - Booster for Stahl Peter series) 05/31/2022     Advance Directives   What are advance directives? Advance directives are legal documents that state your wishes and plans for medical care  These plans are made ahead of time in case you lose your ability to make decisions for yourself  Advance directives can apply to any medical decision, such as the treatments you want, and if you want to donate organs  What are the types of advance directives?   There are many types of advance directives, and each state has rules about how to use them  You may choose a combination of any of the following:  · Living will: This is a written record of the treatment you want  You can also choose which treatments you do not want, which to limit, and which to stop at a certain time  This includes surgery, medicine, IV fluid, and tube feedings  · Durable power of  for healthcare [facility]): This is a written record that states who you want to make healthcare choices for you when you are unable to make them for yourself  This person, called a proxy, is usually a family member or a friend  You may choose more than 1 proxy  · Do not resuscitate (DNR) order:  A DNR order is used in case your heart stops beating or you stop breathing  It is a request not to have certain forms of treatment, such as CPR  A DNR order may be included in other types of advance directives  · Medical directive: This covers the care that you want if you are in a coma, near death, or unable to make decisions for yourself  You can list the treatments you want for each condition  Treatment may include pain medicine, surgery, blood transfusions, dialysis, IV or tube feedings, and a ventilator (breathing machine)  · Values history: This document has questions about your views, beliefs, and how you feel and think about life  This information can help others choose the care that you would choose  Why are advance directives important? An advance directive helps you control your care  Although spoken wishes may be used, it is better to have your wishes written down  Spoken wishes can be misunderstood, or not followed  Treatments may be given even if you do not want them  An advance directive may make it easier for your family to make difficult choices about your care  Fall Prevention    Fall prevention  includes ways to make your home and other areas safer  It also includes ways you can move more carefully to prevent a fall   Health conditions that cause changes in your blood pressure, vision, or muscle strength and coordination may increase your risk for falls  Medicines may also increase your risk for falls if they make you dizzy, weak, or sleepy  Fall prevention tips:   · Stand or sit up slowly  · Use assistive devices as directed  · Wear shoes that fit well and have soles that   · Wear a personal alarm  · Stay active  · Manage your medical conditions  Home Safety Tips:  · Add items to prevent falls in the bathroom  · Keep paths clear  · Install bright lights in your home  · Keep items you use often on shelves within reach  · Paint or place reflective tape on the edges of your stairs  Urinary Incontinence   Urinary incontinence (UI)  is when you lose control of your bladder  UI develops because your bladder cannot store or empty urine properly  The 3 most common types of UI are stress incontinence, urge incontinence, or both  Medicines:   · May be given to help strengthen your bladder control  Report any side effects of medication to your healthcare provider  Do pelvic muscle exercises often:  Your pelvic muscles help you stop urinating  Squeeze these muscles tight for 5 seconds, then relax for 5 seconds  Gradually work up to squeezing for 10 seconds  Do 3 sets of 15 repetitions a day, or as directed  This will help strengthen your pelvic muscles and improve bladder control  Train your bladder:  Go to the bathroom at set times, such as every 2 hours, even if you do not feel the urge to go  You can also try to hold your urine when you feel the urge to go  For example, hold your urine for 5 minutes when you feel the urge to go  As that becomes easier, hold your urine for 10 minutes  Self-care:   · Keep a UI record  Write down how often you leak urine and how much you leak  Make a note of what you were doing when you leaked urine  · Drink liquids as directed   You may need to limit the amount of liquid you drink to help control your urine leakage  Do not drink any liquid right before you go to bed  Limit or do not have drinks that contain caffeine or alcohol  · Prevent constipation  Eat a variety of high-fiber foods  Good examples are high-fiber cereals, beans, vegetables, and whole-grain breads  Walking is the best way to trigger your intestines to have a bowel movement  · Exercise regularly and maintain a healthy weight  Weight loss and exercise will decrease pressure on your bladder and help you control your leakage  · Use a catheter as directed  to help empty your bladder  A catheter is a tiny, plastic tube that is put into your bladder to drain your urine  · Go to behavior therapy as directed  Behavior therapy may be used to help you learn to control your urge to urinate  Weight Management   Why it is important to manage your weight:  Being overweight increases your risk of health conditions such as heart disease, high blood pressure, type 2 diabetes, and certain types of cancer  It can also increase your risk for osteoarthritis, sleep apnea, and other respiratory problems  Aim for a slow, steady weight loss  Even a small amount of weight loss can lower your risk of health problems  How to lose weight safely:  A safe and healthy way to lose weight is to eat fewer calories and get regular exercise  You can lose up about 1 pound a week by decreasing the number of calories you eat by 500 calories each day  Healthy meal plan for weight management:  A healthy meal plan includes a variety of foods, contains fewer calories, and helps you stay healthy  A healthy meal plan includes the following:  · Eat whole-grain foods more often  A healthy meal plan should contain fiber  Fiber is the part of grains, fruits, and vegetables that is not broken down by your body  Whole-grain foods are healthy and provide extra fiber in your diet   Some examples of whole-grain foods are whole-wheat breads and pastas, oatmeal, brown rice, and bulgur  · Eat a variety of vegetables every day  Include dark, leafy greens such as spinach, kale, alyson greens, and mustard greens  Eat yellow and orange vegetables such as carrots, sweet potatoes, and winter squash  · Eat a variety of fruits every day  Choose fresh or canned fruit (canned in its own juice or light syrup) instead of juice  Fruit juice has very little or no fiber  · Eat low-fat dairy foods  Drink fat-free (skim) milk or 1% milk  Eat fat-free yogurt and low-fat cottage cheese  Try low-fat cheeses such as mozzarella and other reduced-fat cheeses  · Choose meat and other protein foods that are low in fat  Choose beans or other legumes such as split peas or lentils  Choose fish, skinless poultry (chicken or turkey), or lean cuts of red meat (beef or pork)  Before you cook meat or poultry, cut off any visible fat  · Use less fat and oil  Try baking foods instead of frying them  Add less fat, such as margarine, sour cream, regular salad dressing and mayonnaise to foods  Eat fewer high-fat foods  Some examples of high-fat foods include french fries, doughnuts, ice cream, and cakes  · Eat fewer sweets  Limit foods and drinks that are high in sugar  This includes candy, cookies, regular soda, and sweetened drinks  Exercise:  Exercise at least 30 minutes per day on most days of the week  Some examples of exercise include walking, biking, dancing, and swimming  You can also fit in more physical activity by taking the stairs instead of the elevator or parking farther away from stores  Ask your healthcare provider about the best exercise plan for you  © Copyright [employer]. 2018 Information is for End User's use only and may not be sold, redistributed or otherwise used for commercial purposes   All illustrations and images included in CareNotes® are the copyrighted property of [employer]  or [address] [place] [address]

## 2023-04-04 NOTE — ASSESSMENT & PLAN NOTE
Stable  Pt seeing Psychiatry and next visit is in April 2023  Continue cymbalta 60 mg qd, buspar 10 mg bid, and alprazolam 0 25 mg as needed

## 2023-04-04 NOTE — ASSESSMENT & PLAN NOTE
A1C done today and was 5 8  Advised pt to follow a low carb diet and to exercise on a regular basis

## 2023-04-05 ENCOUNTER — TELEPHONE (OUTPATIENT)
Dept: FAMILY MEDICINE CLINIC | Facility: CLINIC | Age: 68
End: 2023-04-05

## 2023-04-05 DIAGNOSIS — N39.41 URGE INCONTINENCE OF URINE: ICD-10-CM

## 2023-04-05 NOTE — TELEPHONE ENCOUNTER
Patient needs this medication sent to [facility] on [address] not elder order  Mirabegron ER 25 MG TB24

## 2023-04-07 ENCOUNTER — TELEMEDICINE (OUTPATIENT)
Dept: PSYCHIATRY | Facility: CLINIC | Age: 68
End: 2023-04-07

## 2023-04-07 DIAGNOSIS — F43.23 ADJUSTMENT DISORDER WITH MIXED ANXIETY AND DEPRESSED MOOD: Primary | ICD-10-CM

## 2023-04-07 RX ORDER — BUPROPION HYDROCHLORIDE 100 MG/1
100 TABLET, EXTENDED RELEASE ORAL DAILY
Qty: 30 TABLET | Refills: 1 | Status: SHIPPED | OUTPATIENT
Start: 2023-04-07

## 2023-04-07 NOTE — BH TREATMENT PLAN
TREATMENT PLAN (Medication Management Only)        [employer]    Name and Date of Birth:  Logan Silva 79 y o  1955  Date of Treatment Plan: April 7, 2023  Diagnosis/Diagnoses:    1  Adjustment disorder with mixed anxiety and depressed mood      Strengths/Personal Resources for Self-Care: supportive family, taking medications as prescribed, ability to adapt to life changes, ability to communicate needs, ability to communicate well, ability to listen, ability to reason, ability to understand psychiatric illness, average or above intelligence, family ties, financial means, financial security, general fund of knowledge, motivation for treatment, ability to negotiate basic needs, [employer] affiliation, being resoureceful, self-reliance, special hobby/interest, strong alissa, willingness to work on problems  Area/Areas of need (in own words): anxiety symptoms, depressive symptoms  1  Long Term Goal: alleviate depression  Target Date:6 months - 10/7/2023  Person/Persons responsible for completion of goal: Erika Grewal  2  Short Term Objective (s) - How will we reach this goal?:   A  Provider new recommended medication/dosage changes and/or continue medication(s): Medication changes: I am having Logan Silva start on buPROPion  I am also having her maintain her cholecalciferol, busPIRone, DULoxetine, ALPRAZolam, amoxicillin, and Mirabegron ER     B  Call supportive people when needed   C  Increase socialization with peers  Target Date:6 months - 10/7/2023  Person/Persons Responsible for Completion of Goal: Erika Grewal  Progress Towards Goals: continuing treatment  Treatment Modality: medication management every 6 weeks  Review due 180 days from date of this plan: 6 months - 10/7/2023  Expected length of service: ongoing treatment  My Physician/PA/NP and I have developed this plan together and I agree to work on the goals and objectives   I understand the treatment goals that were developed for my treatment

## 2023-04-07 NOTE — PSYCH
Virtual Visit Disclaimer:       TeleMed provider: NEETA Hickey  Location: at [facility], [address] in [place], [place], [address]    Verification of patient location:     Patient is currently located in the state [facility]  Patient is currently located in a state in which I am licensed     After connecting through televideo, the patient was identified by name and date of birth  Kip Hernandez was informed that this is a telemedicine visit that is being conducted through [address] [facility], and the patient was informed that this is a secure, HIPAA-compliant platform  My office door was closed  No one else was in the room  Kip Hernandez acknowledged consent and understanding of privacy and security of the video platform  Rudy Carreon understands that the online visit is based solely on information provided by the patient, and that, in the absence of a face-to-face physical evaluation by the provider, the diagnosis Rudy Carreon  receives is both limited and provisional in terms of accuracy and completeness  Kip Hernandez understands that they can discontinue the visit at any time  I informed Rudy Carreon that I have reviewed their record in EPIC and presented the opportunity for them to ask any questions regarding the visit today  Kip Hernandez voiced understanding and consented to these terms  Rudy Carreon is aware this is a billable service  Virtual visit start and stop times:    Start Time: 1059    Stop Time: 11:15    I spent 16 minutes with patient today in which greater than 50% of the time was spent in counseling/coordination of care  Javier Hickey 04/07/23     MEDICATION MANAGEMENT NOTE        [facility]      Name and Date of Birth:  Kip Hernandez 79 y o  1955 MRN: [id number]    Date of Visit: April 7, 2023    Reason for Visit: No chief complaint on file          SUBJECTIVE:    Kip Hernandez is a 79 y o  female with "past psychiatric history significant for Adjustment disorder with mixed anxiety and depression who was personally seen and evaluated today at the [address] [place] [address] E outpatient clinic for follow-up and medication management  Completes psychiatric assessment without difficulty  Jenifer Fabricio endorses compliance with psychotropic medication regimen that consists of Cymbalta, Xanax and S/P Lamictal   At previous outpatient psychiatric appointment with this writer, Cymbalta decreased to 60 mg secondary to increased depression with optimize dose at 80  Xanax cross titrated to Klonopin with goal of discontinuation in the future  BuSpar started and optimized to 10 mg twice daily  However, Toribio Linda was unable to tolerate switch from Xanax to Klonopin due to dizziness and was maintained on Xanax 0 25 mg twice daily as needed  Overall, Jenifer Fabricio reports that her anxiety is well managed with BuSpar and Xanax  Utilizes Xanax once daily at times twice  No panic symptoms is struggling most with amotivation and daytime fatigue  States \"I just do not want to do anything\"  Spends most of her days watching her grandchild or engaged in physical therapy  Is frustrated that she still requires the use of her walker for ambulation around her home  Has not yet engaged in social activity with peers  Discussed importance of social connection on mental health  Established a goal of reaching out to peers by next session  Discussed adding Wellbutrin to assist with fatigue, energy, and motivation  Educated patient that Wellbutrin has activating properties and to monitor for increased anxiety, irritability, decreased appetite, and insomnia  No history of seizures  Reports that her appetite fluctuates at this time usually improving by end of day  Sleeping well  During today's examination, Jenifer Fabricio appeared to be future oriented  There was no thought constriction related to death   Denied SI/HI, intent or plan upon " "direct inquiry at this time  Current Rating Scores:     None completed today  Past Psychiatric History: (unchanged information from previous note copied and italicized) - Information that is bolded has been updated       Past Inpatient Psychiatric Treatment:   No history of past inpatient psychiatric admissions  Past Outpatient Psychiatric Treatment:    Most recently in outpatient psychiatric treatment with a family physician  Past Suicide Attempts: no  Past Violent Behavior: no  Past Psychiatric Medication Trials: Paxil, Cymbalta, Lamictal, Xanax and Melatonin, Klonopin (dizzy and \"not right\"), Buspar, Wellbutrin SR     Substance Abuse History: (unchanged information from previous note copied and italicized) - Information that is bolded has been updated       Tobacco/alcohol/caffeine: Denies alcohol use, Denies tobacco use, Caffeine intake: 1 chocolate caffeine candy/day  Illicit drugs: has medical marijuana card for anxiety, did not find it effective   Last use 1 year ago     No past legal actions or arrests secondary to substance intoxication  The patient denies prior DWIs/DUIs  No history of outpatient/inpatient rehabilitation programs  Joanne does not exhibit objective evidence of substance withdrawal during today's examination nor does Joanne appear under the influence of any psychoactive substance        Social History: (unchanged information from previous note copied and italicized) - Information that is bolded has been updated       Developmental: Denies a history of milestone/developmental delay  Denies exposure to in-utero substances  There is no documented history of IEP or need for special education    Education: some college  Marital history:   Children: 2 children (ages 28 and 40)  Living arrangement, social support:   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[employer], now retired and on disability  Access to firearms: Has direct access to weapons/firearms, locked in " maribel Espinal has no history of arrests or violence with a deadly weapon       Traumatic History: (unchanged information from previous note copied and italicized) - Information that is bolded has been updated       Abuse: no history of physical or sexual abuse  Other Traumatic Events: none       Past Medical History:    Past Medical History:   Diagnosis Date   • Anxiety    • Scoliosis    • Skin cancer         Past Surgical History:   Procedure Laterality Date   • BACK SURGERY  1974    velarde damon in back for scoliosis   • COLONOSCOPY  01/01/2013   • ELBOW FRACTURE REPAIR Left 8/11/2020    Procedure: ORIF left humeral shaft, ulnar nerve release, tenotomy anconeus epitrochlearis, radial nerve dissection;  Surgeon: Mattie Pineda MD;  Location: [facility];  Service: Orthopedics   • JOINT REPLACEMENT      left hip and knee   • KIDNEY STONE SURGERY       Allergies   Allergen Reactions   • Macrobid [Nitrofurantoin] Other (See Comments)     Pt does not remember what reaction was       Substance Abuse History:    Social History     Substance and Sexual Activity   Alcohol Use Yes    Comment: occasional     Social History     Substance and Sexual Activity   Drug Use Yes   • Types: Marijuana    Comment: Medical Marijuana       Social History:    Social History     Socioeconomic History   • Marital status: /Civil Union     Spouse name: Not on file   • Number of children: Not on file   • Years of education: Not on file   • Highest education level: Not on file   Occupational History   • Not on file   Tobacco Use   • Smoking status: Never   • Smokeless tobacco: Never   Vaping Use   • Vaping Use: Never used   Substance and Sexual Activity   • Alcohol use: Yes     Comment: occasional   • Drug use: Yes     Types: Marijuana     Comment: Medical Marijuana   • Sexual activity: Not Currently     Partners: Male   Other Topics Concern   • Not on file   Social History Narrative    Abnormal MMG:   No      Abnormal Pap:   No       Date of last pap smear:         PAP/HPV-Neg     Do you have a discharge:   No      Menses monthly:   No      Age at first child:   27      Age at menarche:   25      Current birth control method:   Menopause      History of Abnormal Pap Smear:   No      STD/PID:   No      If post menopausal, age at menopause:   46      Abnormal Pap:   No      Sexually Active:   Yes      Sexual Partners:   Male      Date of Last Colonoscopy:       due in 2019     History of ovarian cysts:   No      Date of last mammogram:   2018    Total:   2      Full term:   2      Livin     1 boy, 1 girl     Tobacco smoking status:   Never smoker      Smoking - how much:   None     Most recent tobacco use screenin2019      Do you currently or have you served in the [employer]:   No      Were you activated, into active duty, as a member of the [employer] or as a Reservist:   No      Occupation:   retired      Education:   15      some college     Marital status:         Sexual orientation:   Heterosexual      Exercise level:   Occasional      Diet:   Regular       General stress level:   Medium      Alcohol intake:   Occasional       Caffeine intake:   Occasional      Illicit drugs:   denies      Seat belts used routinely:   Yes      Sunscreen used routinely:   Yes      Live alone or with others:   with others      History of Physical, Emotional or Sexual Abuse:   No      Presence of domestic violence:   No      Sexually active:   Yes       Do you feel safe at home: Yes                   Social Determinants of Health     Financial Resource Strain: Low Risk    • Difficulty of Paying Living Expenses: Not very hard   Food Insecurity: Not on file   Transportation Needs: No Transportation Needs   • Lack of Transportation (Medical): No   • Lack of Transportation (Non-Medical):  No   Physical Activity: Not on file   Stress: Not on file   Social Connections: Not on file   Intimate Partner Violence: Not on file   Housing Stability: Not on file       Family Psychiatric History:     Family History   Problem Relation Age of Onset   • Skin cancer Mother    • Hypertension Father    • Colon cancer Maternal Uncle         unknown age-maybe n his 62s   • No Known Problems Daughter    • No Known Problems Maternal Grandmother    • No Known Problems Maternal Grandfather    • No Known Problems Paternal Grandmother    • No Known Problems Paternal Grandfather    • No Known Problems Son    • No Known Problems Brother        History Review: The following portions of the patient's history were reviewed and updated as appropriate: allergies, current medications, past medical history and past social history  OBJECTIVE:     Vital signs in last 24 hours: There were no vitals filed for this visit      Mental Status Evaluation:    Appearance age appropriate, casually dressed   Behavior cooperative, calm   Speech normal rate, normal volume, normal pitch   Mood depressed   Affect constricted   Thought Processes organized, goal directed   Associations intact associations   Thought Content no overt delusions   Perceptual Disturbances: no auditory hallucinations, no visual hallucinations   Abnormal Thoughts  Risk Potential Suicidal ideation - None  Homicidal ideation - None  Potential for aggression - No   Orientation oriented to: person, place, time/date and situation   Memory recent and remote memory grossly intact   Consciousness alert and awake   Attention Span Concentration Span attention span and concentration are age appropriate   Intellect appears to be of average intelligence   Insight intact   Judgement intact   Muscle Strength and  Gait decreased muscle strength, uses walker   Motor activity no abnormal movements   Language no difficulty naming common objects, no difficulty repeating a phrase   Fund of Knowledge adequate knowledge of current events  adequate fund of knowledge regarding past history  adequate fund of knowledge regarding vocabulary    Pain none   Pain Scale 0       Laboratory Results: I have personally reviewed all pertinent laboratory/tests results      Lethality Statement:    Based on today's assessment and clinical criteria, [facility] contracts for safety and is not an imminent risk of harm to self or others  Outpatient level of care is deemed appropriate at this current time  Heather Johnson understands that if they can no longer contract for safety, they need to call the office or report to their nearest Emergency Room for immediate evaluation      Assessment/Plan:     In summary, Joanne Espinal is a 79 y o   female, , domiciled with , retired, w/ PMH of osteoperosis and osteoarthritis and PPH of depression and anxiety, no prior psychiatric admissions, no prior SA, no h/o self-injurious behavior, who presented to the mental health clinic for the initial intake and psychiatric evaluation on October 7, 2022   Joanne was referred to the clinic by PCP, Dr Jesús Sahu Cymbalta 60 mg QD, Lamictal 25 mg QD, and Xanax 0 25 mg TID PRN   Tolerating medication well with no medication side effects observed or reported   Not actively involved in individual psychotherapy   Reports that symptoms of anxiety and depression were first noticed in the 1960's after having children   Started on psychotropic medications by PCP after fracturing hip in 2015   Initially on Paxil with good result and no side effects   However was switched to Cymbalta later secondary to being a newer agent with multiple indications   Xanax started shortly after   Has been maintained on this combination for several years   During this time, Heather Johnson has struggled with multiple fractures/injuries with mood remaining somewhat baseline until 2020   Several consecutive stressors led to increased anxiety and depressive symptoms   Primary stressor was suffering from fall which resulted in significant injury/surgery to left arm   Participated in physical therapy with disappointing results   Feels that mobility is difficult and has since become more withdrawn   No longer able to visit grandchildren via commercial flight alone   No longer participates in weekly knitting group with friends   PCP initiated on Lamictal 25 mg daily and placed referral to psychiatry   PHQ-9 score: 10; MAUREEN-7 score: 9   Her current presentation meets criteria for Adjustment disorder with mixed anxiety and depressed mood        Past PHQ-9 scores: 10,4  Past MAUREEN-7 scores: 9,3    Psychopharmacologically, Hailey Kava feels that anxiety symptoms are well controlled at this time  However, amotivation, anhedonia, and fatigue persist   Increased depression with dose optimization of Cymbalta in past   Will start Wellbutrin  mg daily  Educated on activating properties, verbalized understanding and agreement with plan  Establish goal of engaging socially with peers by next session  We will consider dose optimization of BuSpar at next session if clinically appropriate  Risks/benefits/alternativies to treatment discussed, including a myriad of potential adverse medication side effects, to which Monse Freire voiced understanding and consented fully to treatment  Also, patient is amenable to calling/contacting the outpatient office including this writer if any acute adverse effects of their medication regimen arise in addition to any comments or concerns pertaining to their psychiatric management  Plan:  1  Continue Cymbalta 60 mg daily for depression and anxiety  2  Continue BuSpar 10 mg twice daily for anxiety  3  Continue Xanax 0 25 mg TID as needed for anxiety- using 1-2 times daily currently  4  Start Wellbutrin  mg daily for depression  5  Psychotherapy-declines at this time  6  Follow up with primary care provider for ongoing medical care  7   Follow up with this provider in 6 weeks     Diagnoses and all orders for this visit:    Adjustment disorder with mixed anxiety and depressed mood  -     buPROPion (Wellbutrin SR) 100 mg 12 hr tablet; Take 1 tablet (100 mg total) by mouth in the morning           - Psychoeducation provided regarding the importance of exercise and health dietary choices and their impact on mood, energy, and motivation   - Counseled to avoid ETOH, illicit substances, and nicotine secondary to the detrimental effects of these substances on mental and physical health  - Encouraged to engage in non-verbal forms of therapy such as art therapy, music therapy, and mindfulness  Aware of 24 hour and weekend coverage for urgent situations accessed by calling [facility] main practice number    Medications Risks/Benefits      Risks, Benefits And Possible Side Effects Of Medications:    Risks, benefits, and possible side effects of medications explained to [facility] including risk of suicidality and serotonin syndrome related to treatment with antidepressants and risks of misuse, abuse or dependence, sedation and respiratory depression related to treatment with benzodiazepine medications  She verbalizes understanding and agreement for treatment  Controlled Medication Discussion:     [facility] has been filling controlled prescriptions on time as prescribed according to [facility] [address] [facility]  Discussed with [facility] the risks of sedation, respiratory depression, impairment of ability to drive and potential for abuse and addiction related to treatment with benzodiazepine medications   She understands risk of treatment with benzodiazepine medications, agrees to not drive if feels impaired and agrees to take medications as prescribed    Psychotherapy Provided:     Individual psychotherapy provided: Yes  Counseling was provided during the session today for 16 minutes  Medication education provided to [facility] discussed during session  Importance of medication and treatment compliance reviewed with Jenifer Fabricio  Cognitive therapy was utilized during the session  Reassurance and supportive therapy provided  Crisis/safety plan discussed with Gonzalo Briggs     Treatment Plan:    Completed and signed during the session: Yes - Treatment Plan done but not signed at time of office visit due to:  Plan reviewed by video and verbal consent given due to [address] distancing    Visit Time    Visit Start Time: 10:59 AM  Visit Stop Time: 11:15 AM  Total Visit Duration: 16 minutes    JUAN CARLOS Turner 04/07/23    This note was completed in part utilizing Chrystal Pacheco  65  Grammatical, translation, syntax errors, random word insertions, spelling mistakes, and incomplete sentences may be an occasional consequence of this system secondary to software limitations with voice recognition, ambient noise, and hardware issues  If you have any questions or concerns about the content, text, or information contained within the body of this dictation, please contact the provider for clarification

## (undated) DEVICE — ACE WRAP 4 IN STERILE

## (undated) DEVICE — ABDOMINAL PAD: Brand: DERMACEA

## (undated) DEVICE — ACE WRAP 4 IN UNSTERILE

## (undated) DEVICE — 3M™ STERI-STRIP™ REINFORCED ADHESIVE SKIN CLOSURES, R1547, 1/2 IN X 4 IN (12 MM X 100 MM), 6 STRIPS/ENVELOPE: Brand: 3M™ STERI-STRIP™

## (undated) DEVICE — IMPERVIOUS STOCKINETTE: Brand: DEROYAL

## (undated) DEVICE — 2.7MM THREE-FLUTED DRILL BIT QC/125MM

## (undated) DEVICE — SPONGE PVP SCRUB WING STERILE

## (undated) DEVICE — MINI BLADE ROUND TIP SHARP ON ONE SIDE

## (undated) DEVICE — GAUZE SPONGES,16 PLY: Brand: CURITY

## (undated) DEVICE — SUT MONOCRYL 4-0 PS-2 18 IN Y496G

## (undated) DEVICE — DRAPE C-ARM X-RAY

## (undated) DEVICE — STERILE BETHLEHEM PLASTIC HAND: Brand: CARDINAL HEALTH

## (undated) DEVICE — GLOVE SRG BIOGEL 7

## (undated) DEVICE — SUT VICRYL 3-0 SH 27 IN J416H

## (undated) DEVICE — CHLORAPREP HI-LITE 26ML ORANGE

## (undated) DEVICE — COBAN 4 IN STERILE

## (undated) DEVICE — 2.0MM DRILL BIT WITH DEPTH MARK/QC/140MM

## (undated) DEVICE — OCCLUSIVE GAUZE STRIP,3% BISMUTH TRIBROMOPHENATE IN PETROLATUM BLEND: Brand: XEROFORM

## (undated) DEVICE — PENCIL ELECTROSURG E-Z CLEAN -0035H

## (undated) DEVICE — GLOVE INDICATOR PI UNDERGLOVE SZ 7 BLUE

## (undated) DEVICE — 2.5MM DRILL BIT/QC/GOLD/110MM

## (undated) DEVICE — CUFF TOURNIQUET 18 X 4 IN QUICK CONNECT DISP 1 BLADDER

## (undated) DEVICE — SUT VICRYL 0 CT-2 18 IN J727D

## (undated) DEVICE — 2.7MM CORTEX SCREW SELF-TAPPING 20MM
Type: IMPLANTABLE DEVICE | Site: ELBOW | Status: NON-FUNCTIONAL
Removed: 2020-08-11

## (undated) DEVICE — GLOVE INDICATOR PI UNDERGLOVE SZ 6.5 BLUE

## (undated) DEVICE — GLOVE SRG BIOGEL 6.5

## (undated) DEVICE — LIGHT HANDLE COVER SLEEVE DISP BLUE STELLAR

## (undated) DEVICE — PADDING CAST 4 IN  COTTON STRL

## (undated) DEVICE — INTENDED FOR TISSUE SEPARATION, AND OTHER PROCEDURES THAT REQUIRE A SHARP SURGICAL BLADE TO PUNCTURE OR CUT.: Brand: BARD-PARKER ® CARBON RIB-BACK BLADES

## (undated) DEVICE — ACE WRAP 3 IN STERILE

## (undated) DEVICE — PAD GROUNDING ADULT